# Patient Record
Sex: FEMALE | Race: WHITE | NOT HISPANIC OR LATINO | Employment: FULL TIME | URBAN - METROPOLITAN AREA
[De-identification: names, ages, dates, MRNs, and addresses within clinical notes are randomized per-mention and may not be internally consistent; named-entity substitution may affect disease eponyms.]

---

## 2017-02-03 RX ORDER — TAMOXIFEN CITRATE 20 MG/1
20 TABLET ORAL DAILY
Qty: 30 TABLET | Refills: 5 | Status: SHIPPED | OUTPATIENT
Start: 2017-02-03 | End: 2017-08-14 | Stop reason: SDUPTHER

## 2017-05-17 ENCOUNTER — OFFICE VISIT (OUTPATIENT)
Dept: ONCOLOGY | Facility: CLINIC | Age: 42
End: 2017-05-17

## 2017-05-17 VITALS
BODY MASS INDEX: 34.15 KG/M2 | DIASTOLIC BLOOD PRESSURE: 81 MMHG | SYSTOLIC BLOOD PRESSURE: 124 MMHG | WEIGHT: 200 LBS | RESPIRATION RATE: 16 BRPM | TEMPERATURE: 98.7 F | HEART RATE: 84 BPM | HEIGHT: 64 IN

## 2017-05-17 DIAGNOSIS — C50.912 DUCTAL CARCINOMA OF LEFT BREAST (HCC): Primary | ICD-10-CM

## 2017-05-17 PROCEDURE — 99213 OFFICE O/P EST LOW 20 MIN: CPT | Performed by: INTERNAL MEDICINE

## 2017-05-25 ENCOUNTER — LAB REQUISITION (OUTPATIENT)
Dept: LAB | Facility: HOSPITAL | Age: 42
End: 2017-05-25

## 2017-05-25 ENCOUNTER — OUTSIDE FACILITY SERVICE (OUTPATIENT)
Dept: GASTROENTEROLOGY | Facility: CLINIC | Age: 42
End: 2017-05-25

## 2017-05-25 DIAGNOSIS — K50.10 CROHN'S DISEASE OF LARGE INTESTINE WITHOUT COMPLICATION (HCC): ICD-10-CM

## 2017-05-25 PROCEDURE — G0500 MOD SEDAT ENDO SERVICE >5YRS: HCPCS | Performed by: INTERNAL MEDICINE

## 2017-05-25 PROCEDURE — G0105 COLORECTAL SCRN; HI RISK IND: HCPCS | Performed by: INTERNAL MEDICINE

## 2017-05-25 PROCEDURE — 88305 TISSUE EXAM BY PATHOLOGIST: CPT | Performed by: INTERNAL MEDICINE

## 2017-05-31 LAB
CYTO UR: NORMAL
LAB AP CASE REPORT: NORMAL
LAB AP CLINICAL INFORMATION: NORMAL
Lab: NORMAL
PATH REPORT.ADDENDUM SPEC: NORMAL
PATH REPORT.FINAL DX SPEC: NORMAL
PATH REPORT.GROSS SPEC: NORMAL

## 2017-08-14 RX ORDER — TAMOXIFEN CITRATE 20 MG/1
TABLET ORAL
Qty: 30 TABLET | Refills: 6 | Status: SHIPPED | OUTPATIENT
Start: 2017-08-14 | End: 2017-09-13 | Stop reason: SDUPTHER

## 2017-09-13 RX ORDER — TAMOXIFEN CITRATE 20 MG/1
TABLET ORAL
Qty: 30 TABLET | Refills: 6 | Status: SHIPPED | OUTPATIENT
Start: 2017-09-13 | End: 2018-04-16 | Stop reason: SDUPTHER

## 2017-10-24 ENCOUNTER — OFFICE VISIT (OUTPATIENT)
Dept: ONCOLOGY | Facility: CLINIC | Age: 42
End: 2017-10-24

## 2017-10-24 ENCOUNTER — LAB (OUTPATIENT)
Dept: LAB | Facility: HOSPITAL | Age: 42
End: 2017-10-24

## 2017-10-24 VITALS
HEART RATE: 84 BPM | HEIGHT: 64 IN | SYSTOLIC BLOOD PRESSURE: 119 MMHG | BODY MASS INDEX: 35.32 KG/M2 | DIASTOLIC BLOOD PRESSURE: 84 MMHG | WEIGHT: 206.9 LBS | OXYGEN SATURATION: 98 % | RESPIRATION RATE: 16 BRPM | TEMPERATURE: 97.9 F

## 2017-10-24 DIAGNOSIS — C50.912 DUCTAL CARCINOMA OF LEFT BREAST (HCC): ICD-10-CM

## 2017-10-24 DIAGNOSIS — C50.912 DUCTAL CARCINOMA OF LEFT BREAST (HCC): Primary | ICD-10-CM

## 2017-10-24 DIAGNOSIS — I89.0 LYMPHEDEMA: Primary | ICD-10-CM

## 2017-10-24 LAB
ALBUMIN SERPL-MCNC: 4.2 G/DL (ref 3.2–4.8)
ALBUMIN/GLOB SERPL: 1.5 G/DL (ref 1.5–2.5)
ALP SERPL-CCNC: 122 U/L (ref 25–100)
ALT SERPL W P-5'-P-CCNC: 18 U/L (ref 7–40)
ANION GAP SERPL CALCULATED.3IONS-SCNC: 4 MMOL/L (ref 3–11)
AST SERPL-CCNC: 23 U/L (ref 0–33)
BILIRUB SERPL-MCNC: 0.2 MG/DL (ref 0.3–1.2)
BUN BLD-MCNC: 10 MG/DL (ref 9–23)
BUN/CREAT SERPL: 14.3 (ref 7–25)
CALCIUM SPEC-SCNC: 9.3 MG/DL (ref 8.7–10.4)
CHLORIDE SERPL-SCNC: 105 MMOL/L (ref 99–109)
CO2 SERPL-SCNC: 30 MMOL/L (ref 20–31)
CREAT BLD-MCNC: 0.7 MG/DL (ref 0.6–1.3)
ERYTHROCYTE [DISTWIDTH] IN BLOOD BY AUTOMATED COUNT: 13.8 % (ref 11.3–14.5)
GFR SERPL CREATININE-BSD FRML MDRD: 92 ML/MIN/1.73
GLOBULIN UR ELPH-MCNC: 2.8 GM/DL
GLUCOSE BLD-MCNC: 104 MG/DL (ref 70–100)
HCT VFR BLD AUTO: 41.1 % (ref 34.5–44)
HGB BLD-MCNC: 13 G/DL (ref 11.5–15.5)
LYMPHOCYTES # BLD AUTO: 3.4 10*3/MM3 (ref 0.6–4.8)
LYMPHOCYTES NFR BLD AUTO: 29.1 % (ref 24–44)
MCH RBC QN AUTO: 30.9 PG (ref 27–31)
MCHC RBC AUTO-ENTMCNC: 31.7 G/DL (ref 32–36)
MCV RBC AUTO: 97.7 FL (ref 80–99)
MONOCYTES # BLD AUTO: 0.4 10*3/MM3 (ref 0–1)
MONOCYTES NFR BLD AUTO: 3.3 % (ref 0–12)
NEUTROPHILS # BLD AUTO: 7.9 10*3/MM3 (ref 1.5–8.3)
NEUTROPHILS NFR BLD AUTO: 67.6 % (ref 41–71)
PLATELET # BLD AUTO: 387 10*3/MM3 (ref 150–450)
PMV BLD AUTO: 7.9 FL (ref 6–12)
POTASSIUM BLD-SCNC: 3.9 MMOL/L (ref 3.5–5.5)
PROT SERPL-MCNC: 7 G/DL (ref 5.7–8.2)
RBC # BLD AUTO: 4.2 10*6/MM3 (ref 3.89–5.14)
SODIUM BLD-SCNC: 139 MMOL/L (ref 132–146)
WBC NRBC COR # BLD: 11.7 10*3/MM3 (ref 3.5–10.8)

## 2017-10-24 PROCEDURE — 99213 OFFICE O/P EST LOW 20 MIN: CPT | Performed by: INTERNAL MEDICINE

## 2017-10-24 PROCEDURE — 36415 COLL VENOUS BLD VENIPUNCTURE: CPT

## 2017-10-24 PROCEDURE — 80053 COMPREHEN METABOLIC PANEL: CPT | Performed by: INTERNAL MEDICINE

## 2017-10-24 PROCEDURE — 85025 COMPLETE CBC W/AUTO DIFF WBC: CPT

## 2017-10-24 NOTE — PROGRESS NOTES
Chief Complaint   Follow-up premenopausal stage III infiltrating ductal carcinoma the left breast-original diagnosis with biopsy August 22, 2013, followed by neoadjuvant TAC chemotherapy.  ER focally positive, TN negative.  HER-2 positive disease with complete pathologic response by the time of bilateral mastectomies.  Status post adjuvant radiotherapy to the left chest wall and lymphatics, with subsequent tamoxifen to date    PROBLEM LIST   Patient Active Problem List   Diagnosis   • Ductal carcinoma of left breast   • Crohn disease       HISTORY OF PRESENT ILLNESS:   Interim follow-up for this very nice woman.  She is actually a couple weeks early.  She felt that her left axilla felt a little irregular to her.  She could not reveal a mass per se.  She has no pain.  She's continued her tamoxifen.  She really doesn't have any other issues or anything else to report.    Past Medical History, Past Surgical History, Social History, Family History have been reviewed and are without significant changes except as mentioned.      Medications:      Current Outpatient Prescriptions:   •  tamoxifen (NOLVADEX) 20 MG chemo tablet, TAKE 1 TABLET BY MOUTH DAILY, Disp: 30 tablet, Rfl: 6    ALLERGIES:    Allergies   Allergen Reactions   • Epinephrine    • Prilosec [Omeprazole]        ROS:  Review of Systems   Constitutional: Negative for activity change and appetite change.   HENT: Negative for mouth sores, sinus pressure and voice change.    Eyes: Negative for visual disturbance.   Respiratory: Negative for shortness of breath.    Cardiovascular: Negative for chest pain.   Gastrointestinal: Negative for abdominal pain and vomiting.   Genitourinary: Negative for dysuria.   Musculoskeletal: Negative for arthralgias and myalgias.   Skin: Negative for color change.   Neurological: Negative for dizziness, syncope and headaches.   Hematological: Negative for adenopathy.   Psychiatric/Behavioral: Negative for confusion, sleep disturbance  "and suicidal ideas. The patient is not nervous/anxious.            Objective:    /84  Pulse 84  Temp 97.9 °F (36.6 °C) (Temporal Artery )   Resp 16  Ht 64\" (162.6 cm)  Wt 206 lb 14.4 oz (93.8 kg)  SpO2 98%  BMI 35.51 kg/m2    Physical Exam   Constitutional: She is oriented to person, place, and time. She appears well-developed and well-nourished. No distress.   In no acute distress.  Although overweight, she appears quite healthy   HENT:   Head: Normocephalic.   Mouth/Throat: Oropharynx is clear and moist.   Eyes: Conjunctivae and EOM are normal. No scleral icterus.   Neck: Normal range of motion. Neck supple. No thyromegaly present.   Cardiovascular: Normal rate, regular rhythm and normal heart sounds.    No murmur heard.  Pulmonary/Chest: Effort normal and breath sounds normal. She has no wheezes. She has no rales.   Bilateral mastectomy sites demonstrate no evidence of local recurrence bilaterally.  She still has just a little bit of hyperpigmentation of the left chest wall due to radiation therapy years ago   I do not appreciate any masses or irregularities of the left axilla or for that matter of the right.  She does not have any evidence of lymphedema in either arm.   Abdominal: Soft. Bowel sounds are normal. She exhibits no distension and no mass. There is no tenderness.   Musculoskeletal: She exhibits no edema or tenderness.   Lymphadenopathy:     She has no cervical adenopathy.   Neurological: She is alert and oriented to person, place, and time. She displays normal reflexes. No cranial nerve deficit.   Skin: Skin is warm and dry. No rash noted.   Psychiatric: She has a normal mood and affect. Judgment normal.   Vitals reviewed.            RECENT LABS:  Hematology WBC   Date Value Ref Range Status   10/24/2017 11.70 (H) 3.50 - 10.80 10*3/mm3 Final     Comment:     Verified by repeat analysis.      Hemoglobin   Date Value Ref Range Status   10/24/2017 13.0 11.5 - 15.5 g/dL Final     Hematocrit "   Date Value Ref Range Status   10/24/2017 41.1 34.5 - 44.0 % Final     MCV   Date Value Ref Range Status   10/24/2017 97.7 80.0 - 99.0 fL Final     RDW   Date Value Ref Range Status   10/24/2017 13.8 11.3 - 14.5 % Final     MPV   Date Value Ref Range Status   10/24/2017 7.9 6.0 - 12.0 fL Final     Platelets   Date Value Ref Range Status   10/24/2017 387 150 - 450 10*3/mm3 Final     Immature Grans %   Date Value Ref Range Status   11/16/2016 0.2 0.0 - 0.6 % Final     Neutrophils, Absolute   Date Value Ref Range Status   10/24/2017 7.90 1.50 - 8.30 10*3/mm3 Final     Lymphocytes, Absolute   Date Value Ref Range Status   10/24/2017 3.40 0.60 - 4.80 10*3/mm3 Final     Monocytes, Absolute   Date Value Ref Range Status   10/24/2017 0.40 0.00 - 1.00 10*3/mm3 Final     Eosinophils, Absolute   Date Value Ref Range Status   11/16/2016 0.57 (H) 0.10 - 0.30 10*3/mm3 Final     Basophils, Absolute   Date Value Ref Range Status   11/16/2016 0.05 0.00 - 0.20 10*3/mm3 Final     Immature Grans, Absolute   Date Value Ref Range Status   11/16/2016 0.02 0.00 - 0.03 10*3/mm3 Final       Glucose   Date Value Ref Range Status   11/16/2016 94 70 - 100 mg/dL Final     Sodium   Date Value Ref Range Status   11/16/2016 139 132 - 146 mmol/L Final     Potassium   Date Value Ref Range Status   11/16/2016 4.0 3.5 - 5.5 mmol/L Final     CO2   Date Value Ref Range Status   11/16/2016 31.0 20.0 - 31.0 mmol/L Final     Chloride   Date Value Ref Range Status   11/16/2016 105 99 - 109 mmol/L Final     Anion Gap   Date Value Ref Range Status   11/16/2016 3.0 3.0 - 11.0 mmol/L Final     Creatinine   Date Value Ref Range Status   11/16/2016 0.80 0.60 - 1.30 mg/dL Final     BUN   Date Value Ref Range Status   11/16/2016 16 9 - 23 mg/dL Final     BUN/Creatinine Ratio   Date Value Ref Range Status   11/16/2016 20.0 7.0 - 25.0 Final     Calcium   Date Value Ref Range Status   11/16/2016 8.9 8.7 - 10.4 mg/dL Final     eGFR Non  Amer   Date Value Ref  Range Status   11/16/2016 79 >60 mL/min/1.73 Final     Alkaline Phosphatase   Date Value Ref Range Status   11/16/2016 91 25 - 100 U/L Final     Total Protein   Date Value Ref Range Status   11/16/2016 6.9 5.7 - 8.2 g/dL Final     ALT (SGPT)   Date Value Ref Range Status   11/16/2016 18 7 - 40 U/L Final     AST (SGOT)   Date Value Ref Range Status   11/16/2016 23 0 - 33 U/L Final     Total Bilirubin   Date Value Ref Range Status   11/16/2016 0.1 (L) 0.3 - 1.2 mg/dL Final     Albumin   Date Value Ref Range Status   11/16/2016 4.10 3.20 - 4.80 g/dL Final     Globulin   Date Value Ref Range Status   11/16/2016 2.8 gm/dL Final     A/G Ratio   Date Value Ref Range Status   11/16/2016 1.5 g/dL Final          Perf Status:0    Assessment/Plan    Diagnoses and all orders for this visit:    Ductal carcinoma of left breast      I think that Debbie is doing splendidly.  I suspect that she has very very modest lymphedema accounting for what she felt a week or more ago.  I reassured her that I thought things were fine.  I will go ahead and get a targeted ultrasound of the left axilla.      Rafael Leahy MD , 10/24/2017    CC:

## 2017-11-02 ENCOUNTER — HOSPITAL ENCOUNTER (OUTPATIENT)
Dept: PHYSICAL THERAPY | Facility: HOSPITAL | Age: 42
Setting detail: THERAPIES SERIES
Discharge: HOME | End: 2017-11-02
Attending: INTERNAL MEDICINE

## 2017-11-02 DIAGNOSIS — I97.2 POSTMASTECTOMY LYMPHEDEMA SYNDROME: Primary | ICD-10-CM

## 2017-11-02 PROCEDURE — 97140 MANUAL THERAPY 1/> REGIONS: CPT

## 2017-11-02 PROCEDURE — 97110 THERAPEUTIC EXERCISES: CPT

## 2017-11-02 PROCEDURE — 97161 PT EVAL LOW COMPLEX 20 MIN: CPT

## 2017-11-02 NOTE — THERAPY EVALUATION
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 Patient reported that she came to PT post op her surgery in  and was measured for a left compression garment but she never ended up buying one as she thought she did not need it.  Patient reported having light pain on the left side. At present she has not had reconstruction but she is still planning to do so once she gets everything sorted. She stated that the left side feels puffy. It always hurts and she knows it is there. She was on vacation last week and she had no pain. Patient had DCIS in  with follow up treatments of chemo then bilateral mastectomies that were skin sparing then 30 -45 radiation treatments. Patient reported that she has had a cough recently that the MDs think is allergies but her swelling is worse with allergies too. Patient is waiting on having an ultrasound on . She stated that she is going to see her primary care MD next week for multiple reasons. Patient also reported  that sometimes when she is sitting she can get a Pranav horse in the left side of her chest for no reason and it will cramp then release and go away. Very brief and it does not come back. She stated that she can do all of her activities, except vacuuming but her vacuum is really heavy, there is nothing that she is not doing. She is in remission with her crohn's disease so she is doing ok apart from putting on 10 - 20 # with her chemo treatments and she is unable to decrease the weight. She has tried weight watchers but she has not had much success.  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 No  -CD      What clinical tests have you had for this problem? Other 1 (comment)   awaiting ultrasound on December 7th  -CD      Pain     Pain Location Neck;Shoulder  -CD      Pain at Present 0  -CD      Pain at Best 0  -CD      Pain at Worst 3  -CD      Pain Frequency Intermittent  -CD      Pain Description Spasm;Cramping;Sore;Tightness  -CD      What Performance Factors Make the Current Problem(s) WORSE? --   stress, posture  -CD      Is your sleep disturbed? No  -CD      Is medication used to assist with sleep? No  -CD      Total hours of sleep per night Patient reported that she is snoring quite badly at present and is going to see her primary next week as she needs it investigating  -CD      What position do you sleep in? Right sidelying  -CD      Difficulties at work? looking down on the computer, screeens on the computer and stress - she was off on vacation last week and did not have any  symptoms  -CD      Difficulties with ADL's? no  -CD      Fall Risk Assessment    Any falls in the past year: No  -CD      Daily Activities    Primary Language English  -CD      Are you able to read Yes  -CD      Are you able to write Yes  -CD      How does patient learn best? Listening  -CD      Teaching needs identified Home Exercise Program;Management of Condition  -CD      Patient is concerned about/has problems with Flexibility;Other (comment)   swelling   -CD      Does patient have problems with the following? None  -CD      Barriers to learning None  -CD      Pt Participated in POC and Goals Yes  -CD      Safety    Are you being hurt, hit, or frightened by anyone at home or in your life? No  -CD      Are you being neglected by a caregiver No  -CD        User Key  (r) = Recorded By, (t) = Taken By, (c) = Cosigned By    Initials Name Provider Type    CD Zofia Retana, PT Physical Therapist                Lymphedema       11/02/17 1600          Lymphedema Assessment    Lymphedema Classification LUE:;secondary;stage 1 (Spontaneously Reversible)  -CD      Lymphedema Cancer Related Sx simple mastectomy;sentinel node biopsy  -CD      Stage of Cancer Stage 0  -CD      Chemo Received yes  -CD      Chemo Treatments #/Timeframe 6   then an year of herceptin  -CD      Radiation Therapy Received yes  -CD      Radiation Treatments #/Timeframe 35-40  -CD      Lymphedema Edema Assessment    Edema Assessment Comment swelling noted on the left supraclavicular region and anterior shoulder/ axilla  -CD      Skin Changes/Observations    Skin Observations Comment well healed - no open areas, no increased temperaure and no redness. the left side of her chest skin is tight from radiaiton on palpation  -CD      Lymphedema Sensation    Lymphedema Sensation Comments top of chest circumference 97 cm mid chest 99 cm  -CD      Lymphedema Measurements    Measurement Type(s) Volumetric  -CD      Volumetric Areas Upper extremities  -CD       Volumetric UE Distance interval (cm) 4  -CD      LUE Volumetric (cm)    Reference Point 0 18.7  -CD      4 22 cm  -CD      8 16.4 cm  -CD      12 17 cm  -CD      16 20.5 cm  -CD      20 24.2 cm  -CD      24 26 cm  -CD      28 26.5 cm  -CD      32 29 cm  -CD      36 33 cm  -CD      40 34.5 cm  -CD      44 37 cm  -CD      48 37.5 cm  -CD      LUE Volume Calculation- 4cm 2967.2  -CD      RUE Volumetric (cm)    Reference Point 0 19  -CD      4 21.5 cm  -CD      8 16 cm  -CD      12 17.5 cm  -CD      16 20 cm  -CD      20 23 cm  -CD      24 27.5 cm  -CD      28 28 cm  -CD      32 28 cm  -CD      36 33 cm  -CD      40 35 cm  -CD      44 37.5 cm  -CD      48 38 cm  -CD      RUE Volume Calculation- 4cm 3005.5  -CD      Manual Lymphatic Drainage    Manual Lymphatic Drainage initial sequence;opened regional lymph nodes;extremity treatment  -CD      Initial Sequence supraclavicular;shoulder collectors;abdomen  -CD      Supraclavicular right;left  -CD      Shoulder Collectors right;left  -CD      Abdomen superficial;deep  -CD      Opened Regional Lymph Nodes axillary  -CD      Axillary right;left  -CD      Extremity Treatment MLD to proximal limb only  -CD      MLD to Proximal Limb Only anterior chest, left upper arm and axilla region  -CD      Manual Lymphatic Drainage Comments Taught self simple MLD and written instructions provided.   -CD      Compression/Skin Care    Compression/Skin Care compression garment  -CD      Compression Garment Comments measured for a compression sleeve and provided patient with the measures. Advised a compression t-shirt for the upper arm/ neck as well as a posture brace. i also advised that she look for a wider bra to support the tissue at the side to prevent it being squeezed out where i think her swelling is  -CD        User Key  (r) = Recorded By, (t) = Taken By, (c) = Cosigned By    Initials Name Provider Type    BRENDA Retana PT Physical Therapist                  PT Ortho        11/02/17 1600    Subjective Pain    Able to rate subjective pain? yes  -CD    Pre-Treatment Pain Level 0  -CD    Post-Treatment Pain Level 0  -CD    Posture/Observations    Posture/Observations Comments Flexed posture with both shoulders anteriorly rotated left > right. The left one also has some swelling anteriorly that may be due to the fit of the bra. where the bra goes round the tissue is being pushed out and then is causing a swelling - she is going ot look fo ra bra that has higher sides to support under the axilla to try and help with the swelling  -CD    ROM (Range of Motion)    General ROM Detail WNL  -CD    MMT (Manual Muscle Testing)    General MMT Assessment Detail  right 53, 50, 50 Left 51, 50, 49#  -CD      User Key  (r) = Recorded By, (t) = Taken By, (c) = Cosigned By    Initials Name Provider Type    BRENDA Retana, PT Physical Therapist                          Therapy Education       11/02/17 1620          Therapy Education    Given HEP;Symptoms/condition management;Edema management;Posture/body mechanics  -CD      Program New  -CD      How Provided Verbal;Demonstration;Written  -CD      Provided to Patient  -CD      Level of Understanding Teach back education performed;Verbalized;Demonstrated  -CD        User Key  (r) = Recorded By, (t) = Taken By, (c) = Cosigned By    Initials Name Provider Type    CD Zofia Retana, PT Physical Therapist                  Exercises       11/02/17 1600          Subjective Pain    Able to rate subjective pain? yes  -CD      Pre-Treatment Pain Level 0  -CD      Post-Treatment Pain Level 0  -CD      Exercise 1    Exercise Name 1 Postural correction overcorrect in sitting  -CD      Cueing 1 Tactile  -CD      Sets 1 1  -CD      Reps 1 10  -CD      Time (Seconds) 1 5  -CD      Exercise 2    Exercise Name 2 blade squeezes in sitting and standing against the wall  -CD      Cueing 2 Tactile  -CD      Sets 2 1  -CD      Reps 2 10  -CD      Time (Seconds) 2 5  -CD       Additional Comments written instructions provided  -CD        User Key  (r) = Recorded By, (t) = Taken By, (c) = Cosigned By    Initials Name Provider Type    BRENDA Retana, PT Physical Therapist                              PT OP Goals       11/02/17 1600       PT Short Term Goals    STG Date to Achieve 11/16/17  -CD     STG 1 Patient demonstrate proper awareness of What is Lymphedema and 18 Steps of Prevention for prevention, management, care of symptoms and ease of transition to self-care of condition.   -CD     STG 2 Patient independent and compliant with self-massage techniques as needed for improved lymphatic drainage, decreased edema/symptoms, decreased risk of infection and improved transition to self-care of condition.   -CD     STG 3 Patient to be measured for a compression garment  -CD     STG 3 Progress Met  -CD     Long Term Goals    LTG Date to Achieve 11/30/17  -CD     LTG 1 Patient to be independent donning and doffing their compression garment   -CD     LTG 2 Patient to be independent with home program to improve her posture  -CD     Time Calculation    PT Goal Re-Cert Due Date 12/02/17  -CD       User Key  (r) = Recorded By, (t) = Taken By, (c) = Cosigned By    Initials Name Provider Type    BRENDA Retana, PT Physical Therapist                PT Assessment/Plan       11/02/17 1622       PT Assessment    Functional Limitations Performance in work activities  -CD     Impairments Impaired flexibility;Edema;Impaired lymphatic circulation  -CD     Assessment Comments Patient has low stable symptoms associated with treatments for breast cancer. She verbalised understanding of all advice and eduaciton and to get the compression sleeve and t-shirt as well as bra to help with the swelling. She also seemed to undeerstand the posture exercises and demonstrate them . she has stage one lymphdema in the left side of her chest and supraclvicular region following treatments for breast cancer.  -CD      Please refer to paper survey for additional self-reported information Yes  -CD     Rehab Potential Good  -CD     Patient/caregiver participated in establishment of treatment plan and goals Yes  -CD     Patient would benefit from skilled therapy intervention Yes  -CD     PT Plan    PT Frequency 1x/week  -CD     Predicted Duration of Therapy Intervention (days/wks) 2-3 weeks  -CD     Planned CPT's? PT EVAL LOW COMPLEXITY: 12795;PT THER PROC EA 15 MIN: 28255;PT MANUAL THERAPY EA 15 MIN: 57684  -CD     PT Plan Comments Continue next week  -CD       User Key  (r) = Recorded By, (t) = Taken By, (c) = Cosigned By    Initials Name Provider Type    CD Zofia Retana, PT Physical Therapist                 Outcome Measures       11/02/17 1600          DASH    Open a tight or new jar. 1  -CD      Write 1  -CD      Turn a key 1  -CD      Prepare a meal 1  -CD      Push open a heavy door 1  -CD      Place an object on a shelf above your head 1  -CD      Do heavy household chores (e.g., wash walls, wash floors) 1  -CD      Garden or do yard work 1  -CD      Make a bed 1  -CD      Carry a shopping bag or briefcase 1  -CD      Carry a heavy object (over 10 lbs) 1  -CD      Change a lightbulb overhead 2  -CD      Wash or blow dry your hair 1  -CD      Wash your back 1  -CD      Put on a pullover sweater 1  -CD      Use a knife to cut food 1  -CD      Recreational activities in which require little effort (e.g., cardplaying, knitting, etc.) 1  -CD      Recreational activities in which you take some force or impact through your arm, should or hand (e.g. golf, hammering, tennis, etc.) 1  -CD      Recreational Activities in which you move your arm freely (e.g., frisbee, badminton, etc.) 1  -CD      Manage transportation needs (getting from one place to another) 1  -CD      Sexual Activities 1  -CD      During the past week, to what extent has your arm, shoulder, or hand problem interfered with your normal social activites with family,  friends, neighbors or groups? 1  -CD      During the past week, were you limited in your work or other regular daily activities as a result of your arm, shoulder or hand problem? 1  -CD      Arm, Shoulder, or hand pain 2  -CD      Arm, shoulder or hand pain when you performed any specific activity 1  -CD      Tingling (pins and needles) in your arm, shoulder, or hand 1  -CD      Weakness in your arm, shoulder or hand 1  -CD      Stiffness in your arm, shoulder or hand 2  -CD      During the past week, how much difficulty have you had sleeping because of the pain in your arm, shoulder or hand? 1  -CD      I feel less capable, less confident or less useful because of my arm, shoulder or hand problem 2  -CD      DASH Sum  34  -CD      Number of Questions Answered 30  -CD      DASH Score 3.33  -CD      Functional Assessment    Outcome Measure Options Disabilities of the Arm, Shoulder, and Hand (DASH)  -CD        User Key  (r) = Recorded By, (t) = Taken By, (c) = Cosigned By    Initials Name Provider Type    CD Zofia Retana, PT Physical Therapist            Time Calculation:   Start Time: 1000  Total Timed Code Minutes- PT: 35 minute(s)     Therapy Charges for Today     Code Description Service Date Service Provider Modifiers Qty    73102624655 HC PT MANUAL THERAPY EA 15 MIN 11/2/2017 Zofia Retana, PT GP 1    04798734380 HC PT THER PROC EA 15 MIN 11/2/2017 Zofia Retana, PT GP 1    18607637451 HC PT EVAL LOW COMPLEXITY 4 11/2/2017 Zofia Retana, PT GP 1          PT G-Codes  Outcome Measure Options: Disabilities of the Arm, Shoulder, and Hand (DASH)         Zofia Retana, PT  11/2/2017

## 2017-11-09 ENCOUNTER — HOSPITAL ENCOUNTER (OUTPATIENT)
Dept: PHYSICAL THERAPY | Facility: HOSPITAL | Age: 42
Setting detail: THERAPIES SERIES
Discharge: HOME | End: 2017-11-09
Attending: INTERNAL MEDICINE

## 2017-11-09 DIAGNOSIS — I97.2 POSTMASTECTOMY LYMPHEDEMA SYNDROME: Primary | ICD-10-CM

## 2017-11-09 PROCEDURE — 97140 MANUAL THERAPY 1/> REGIONS: CPT

## 2017-11-09 NOTE — THERAPY TREATMENT NOTE
Outpatient Physical Therapy Lymphedema Treatment Note  [facility]     Patient Name: Debbie Clemens  : 1975  MRN: [id number]  Today's Date: 2017        Visit Date: 2017    Visit Dx:    ICD-10-CM ICD-9-CM   1. 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 She stated that she is having some issues with the right lower quadrant and is having pain and going to her gynecologist later today. She has not yet had the ultrasound on the left side of her chest.  -CD      Subjective Pain    Able to rate subjective pain? yes  -CD      Pre-Treatment Pain Level 4  -CD      Post-Treatment Pain Level 4  -CD      Subjective Pain Comment right lower abdoment  -CD      Lymphedema Edema Assessment    Edema Assessment Comment less swelling noted on the left upper supraclavicular region as well as under the axilla. She stated that she has not yet had time to get the camisole compression yet  -CD      Skin Changes/Observations    Skin Observations Comment right lower quadrant on the abdomen - no redness but it is increased temperature and there was a thickened area of about 10 cm width by 5 cm length that was firm and i had patient palpate so she could show her MD later today. She is having issues with cysts.  -CD      Lymphedema Sensation    Lymphedema Sensation Comments top of chest 95.5 cm mid chest 98cm  -CD      Lymphedema Measurements    Measurement Type(s) Volumetric  -CD      Volumetric Areas Upper extremities  -CD      Volumetric UE Distance interval (cm) 4  -CD      LUE Volumetric (cm)    Reference Point 0 19.5  -CD      4 21.5 cm  -CD      8 16 cm  -CD       12 17.5 cm  -CD      16 20.3 cm  -CD      20 24 cm  -CD      24 26.5 cm  -CD      28 26 cm  -CD      32 29.5 cm  -CD      36 32.8 cm  -CD      40 34 cm  -CD      44 35 cm  -CD      48 36 cm  -CD      LUE Volume Calculation- 4cm 2869.2  -CD      Manual Lymphatic Drainage    Manual Lymphatic Drainage initial sequence;opened regional lymph nodes;extremity treatment  -CD      Initial Sequence supraclavicular;shoulder collectors;abdomen  -CD      Supraclavicular right;left  -CD      Shoulder Collectors right;left  -CD      Abdomen superficial  -CD      Opened Regional Lymph Nodes axillary  -CD      Axillary right;left  -CD      Extremity Treatment MLD to full limb  -CD      MLD to Proximal Limb Only anterior chest and upper limb mostly  -CD      Manual Lymphatic Drainage Comments Reinforced simple MLD and posture exercises and patient is already correcting her posture in sitting and standing  -CD        User Key  (r) = Recorded By, (t) = Taken By, (c) = Cosigned By    Initials Name Provider Type    BRENDA Retana, PT Physical Therapist                              PT Assessment/Plan       11/09/17 1529       PT Assessment    Assessment Comments Patient's swelling in the top of the chest into the axilla is improved and was less swollen today. she verbalised understanding of showing her MD the right lower quadrant thickening. she is working well with the posture exercises.  -CD     PT Plan    PT Plan Comments Continue next week  -CD       User Key  (r) = Recorded By, (t) = Taken By, (c) = Cosigned By    Initials Name Provider Type    BRENDA Retana, PT Physical Therapist                     Exercises       11/09/17 1500          Subjective Comments    Subjective Comments Patient reported that she has tried to do the simple MLD and she has noticed that the swelling on the left side of the chest is better and it is less swollen now. She stated that she is having some issues with the right lower quadrant and is  having pain and going to her gynecologist later today. She has not yet had the ultrasound on the left side of her chest.  -CD      Subjective Pain    Able to rate subjective pain? yes  -CD      Pre-Treatment Pain Level 4  -CD      Post-Treatment Pain Level 4  -CD      Subjective Pain Comment right lower abdoment  -CD        User Key  (r) = Recorded By, (t) = Taken By, (c) = Cosigned By    Initials Name Provider Type    CD Zofia Retana PT Physical Therapist                                  Therapy Education       11/09/17 1529          Therapy Education    Given Symptoms/condition management;Edema management;Posture/body mechanics  -CD      Program Reinforced  -CD      How Provided Verbal;Demonstration  -CD      Provided to Patient  -CD      Level of Understanding Verbalized  -CD        User Key  (r) = Recorded By, (t) = Taken By, (c) = Cosigned By    Initials Name Provider Type    CD Zofia Retana, PT Physical Therapist                Time Calculation:   Start Time: 1000  Total Timed Code Minutes- PT: 55 minute(s)     Therapy Charges for Today     Code Description Service Date Service Provider Modifiers Qty    76067420756 HC PT MANUAL THERAPY EA 15 MIN 11/9/2017 Zofia Retana, PT GP 4                    Zofia Retana PT  11/9/2017

## 2017-11-14 ENCOUNTER — HOSPITAL ENCOUNTER (OUTPATIENT)
Dept: PHYSICAL THERAPY | Facility: HOSPITAL | Age: 42
Setting detail: THERAPIES SERIES
Discharge: HOME | End: 2017-11-14
Attending: INTERNAL MEDICINE

## 2017-11-14 DIAGNOSIS — I97.2 POSTMASTECTOMY LYMPHEDEMA SYNDROME: Primary | ICD-10-CM

## 2017-11-14 PROCEDURE — 97140 MANUAL THERAPY 1/> REGIONS: CPT

## 2017-11-14 NOTE — THERAPY DISCHARGE NOTE
Outpatient Physical Therapy Lymphedema Treatment Note/Discharge Summary   [place]     Patient Name: Debbie Clemens  : 1975  MRN: [id number]  Today's Date: 2017      Visit Date: 2017    Visit Dx:    ICD-10-CM ICD-9-CM   1. 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 She stated that she has her ultrasound on  still. She also stated that she has had her right side if her abdomen checked and that she had an CT scan and all is ok. To monitor and call back if she has any other issues with her abdomen  -CD      Subjective Pain    Able to rate subjective pain? yes  -CD      Pre-Treatment Pain Level 0  -CD      Post-Treatment Pain Level 0  -CD      Subjective Pain Comment no left chest or upper arm pain  -CD      Lymphedema Edema Assessment    Edema Assessment Comment soft tissue less swelling in the left upper arm  -CD      Skin Changes/Observations    Skin Observations Comment nil - no open areas, no redness or no increased temperature. no driness on the skin either it looks good  -CD      Lymphedema Measurements    Measurement Type(s) Volumetric  -CD      Volumetric Areas Upper extremities  -CD      Volumetric UE Distance interval (cm) 4  -CD      LUE Volumetric (cm)    Reference Point 0 19  -CD      4 21 cm  -CD      8 16.1 cm  -CD      12 17.4 cm  -CD      16 20.3 cm  -CD      20 14.4 cm  -CD      24 26 cm  -CD      28 26.5 cm  -CD      32 30.5 cm  -CD      36 33 cm  -CD      40 33.5 cm  -CD      44 36 cm  -CD      48 37 cm  -CD      LUE Volume Calculation- 4cm 2803.4  -CD      Manual Lymphatic Drainage    Manual Lymphatic Drainage initial sequence;opened regional lymph nodes;extremity treatment   -CD      Initial Sequence supraclavicular;shoulder collectors;abdomen  -CD      Supraclavicular right;left  -CD      Shoulder Collectors right;left  -CD      Abdomen superficial  -CD      Opened Regional Lymph Nodes axillary  -CD      Axillary right;left  -CD      Extremity Treatment MLD to full limb  -CD      MLD to Proximal Limb Only anteiror chest/ arm  -CD      Manual Lymphatic Drainage Comments discussed posture and compression  -CD        User Key  (r) = Recorded By, (t) = Taken By, (c) = Cosigned By    Initials Name Provider Type    BRENDA Retana, PT Physical Therapist                              PT Assessment/Plan       11/14/17 1644       PT Assessment    Assessment Comments Patient has much improvement in the swelling on the left side of her chest into the axilla and she did not report any issue with the supraclavicular region today. She is working on her posture and doing very well with that and she has been working on the simple MLD. She has met most of her goals except the compression but she is still working on getting a garment.   -CD     PT Plan    PT Plan Comments Patient to continue with self management and will call if she needs further treatment in the future  -CD       User Key  (r) = Recorded By, (t) = Taken By, (c) = Cosigned By    Initials Name Provider Type    BRENDA Retana, PT Physical Therapist                     Exercises       11/14/17 0900          Subjective Comments    Subjective Comments Patient reported that the left side of her chest and upper arm is better and less swollen. She stated that she has her ultrasound on December 6th still. She also stated that she has had her right side if her abdomen checked and that she had an CT scan and all is ok. To monitor and call back if she has any other issues with her abdomen  -CD      Subjective Pain    Able to rate subjective pain? yes  -CD      Pre-Treatment Pain Level 0  -CD      Post-Treatment Pain Level 0  -CD       Subjective Pain Comment no left chest or upper arm pain  -CD        User Key  (r) = Recorded By, (t) = Taken By, (c) = Cosigned By    Initials Name Provider Type    BRENDA Retana PT Physical Therapist                              PT OP Goals       11/14/17 1600       PT Short Term Goals    STG Date to Achieve 11/16/17  -CD     STG 1 Patient demonstrate proper awareness of What is Lymphedema and 18 Steps of Prevention for prevention, management, care of symptoms and ease of transition to self-care of condition.   -CD     STG 1 Progress Met  -CD     STG 2 Patient independent and compliant with self-massage techniques as needed for improved lymphatic drainage, decreased edema/symptoms, decreased risk of infection and improved transition to self-care of condition.   -CD     STG 2 Progress Met  -CD     STG 3 Patient to be measured for a compression garment  -CD     STG 3 Progress Met  -CD     Long Term Goals    LTG Date to Achieve 11/30/17  -CD     LTG 1 Patient to be independent donning and doffing their compression garment   -CD     LTG 1 Progress Not Met  -CD     LTG 2 Patient to be independent with home program to improve her posture  -CD     LTG 2 Progress Met  -CD       User Key  (r) = Recorded By, (t) = Taken By, (c) = Cosigned By    Initials Name Provider Type    BRENDA Retana PT Physical Therapist                Therapy Education       11/14/17 1644          Therapy Education    Given Symptoms/condition management;Edema management;Posture/body mechanics  -CD      Program Reinforced  -CD      How Provided Verbal;Demonstration  -CD      Provided to Patient  -CD      Level of Understanding Verbalized  -CD        User Key  (r) = Recorded By, (t) = Taken By, (c) = Cosigned By    Initials Name Provider Type    BRENDA Retana PT Physical Therapist                Time Calculation:   Start Time: 0830  Total Timed Code Minutes- PT: 55 minute(s)     Therapy Charges for Today     Code Description Service  Date Service Provider Modifiers Qty    45651851487 HC PT MANUAL THERAPY EA 15 MIN 11/14/2017 Zofia Retana, PT GP 4                 OP PT Discharge Summary  Date of Discharge: 11/14/17  Reason for Discharge: All goals achieved  Outcomes Achieved: Able to achieve all goals within established timeline  Discharge Destination: Home with home program      Zofia Retana, PT  11/14/2017

## 2017-12-07 ENCOUNTER — HOSPITAL ENCOUNTER (OUTPATIENT)
Dept: ULTRASOUND IMAGING | Facility: HOSPITAL | Age: 42
Discharge: HOME | End: 2017-12-07
Attending: RADIOLOGY

## 2017-12-07 ENCOUNTER — HOSPITAL ENCOUNTER (OUTPATIENT)
Dept: MAMMOGRAPHY | Facility: HOSPITAL | Age: 42
Discharge: HOME | End: 2017-12-07
Attending: INTERNAL MEDICINE | Admitting: INTERNAL MEDICINE

## 2017-12-07 DIAGNOSIS — C50.912 DUCTAL CARCINOMA OF LEFT BREAST (HCC): ICD-10-CM

## 2017-12-07 DIAGNOSIS — I89.0 LYMPHEDEMA: ICD-10-CM

## 2017-12-07 PROCEDURE — 76642 ULTRASOUND BREAST LIMITED: CPT

## 2017-12-07 PROCEDURE — G0279 TOMOSYNTHESIS, MAMMO: HCPCS

## 2017-12-07 PROCEDURE — G0206 DX MAMMO INCL CAD UNI: HCPCS

## 2017-12-08 PROCEDURE — 76642 ULTRASOUND BREAST LIMITED: CPT | Performed by: RADIOLOGY

## 2017-12-08 PROCEDURE — 77061 BREAST TOMOSYNTHESIS UNI: CPT | Performed by: RADIOLOGY

## 2017-12-08 PROCEDURE — 77065 DX MAMMO INCL CAD UNI: CPT | Performed by: RADIOLOGY

## 2018-01-01 ENCOUNTER — TELEPHONE (OUTPATIENT)
Dept: URGENT CARE | Facility: CLINIC | Age: 43
End: 2018-01-01

## 2018-01-01 NOTE — TELEPHONE ENCOUNTER
Patient called stating her pharmacy is closed and would like it changed to [facility], RX called to [address]

## 2018-04-16 RX ORDER — TAMOXIFEN CITRATE 20 MG/1
TABLET ORAL
Qty: 30 TABLET | Refills: 11 | Status: ON HOLD | OUTPATIENT
Start: 2018-04-16 | End: 2021-03-17

## 2018-04-24 ENCOUNTER — OFFICE VISIT (OUTPATIENT)
Dept: ONCOLOGY | Facility: CLINIC | Age: 43
End: 2018-04-24

## 2018-04-24 ENCOUNTER — LAB (OUTPATIENT)
Dept: LAB | Facility: HOSPITAL | Age: 43
End: 2018-04-24

## 2018-04-24 VITALS
HEIGHT: 64 IN | WEIGHT: 200 LBS | TEMPERATURE: 98.3 F | DIASTOLIC BLOOD PRESSURE: 82 MMHG | RESPIRATION RATE: 16 BRPM | HEART RATE: 83 BPM | BODY MASS INDEX: 34.15 KG/M2 | SYSTOLIC BLOOD PRESSURE: 125 MMHG

## 2018-04-24 DIAGNOSIS — C50.912 DUCTAL CARCINOMA OF LEFT BREAST (HCC): Primary | ICD-10-CM

## 2018-04-24 DIAGNOSIS — C50.912 DUCTAL CARCINOMA OF LEFT BREAST (HCC): ICD-10-CM

## 2018-04-24 LAB
ALBUMIN SERPL-MCNC: 4.2 G/DL (ref 3.2–4.8)
ALBUMIN/GLOB SERPL: 1.6 G/DL (ref 1.5–2.5)
ALP SERPL-CCNC: 128 U/L (ref 25–100)
ALT SERPL W P-5'-P-CCNC: 18 U/L (ref 7–40)
ANION GAP SERPL CALCULATED.3IONS-SCNC: 6 MMOL/L (ref 3–11)
AST SERPL-CCNC: 19 U/L (ref 0–33)
BILIRUB SERPL-MCNC: 0.3 MG/DL (ref 0.3–1.2)
BUN BLD-MCNC: 10 MG/DL (ref 9–23)
BUN/CREAT SERPL: 14.3 (ref 7–25)
CALCIUM SPEC-SCNC: 9 MG/DL (ref 8.7–10.4)
CHLORIDE SERPL-SCNC: 105 MMOL/L (ref 99–109)
CO2 SERPL-SCNC: 28 MMOL/L (ref 20–31)
CREAT BLD-MCNC: 0.7 MG/DL (ref 0.6–1.3)
ERYTHROCYTE [DISTWIDTH] IN BLOOD BY AUTOMATED COUNT: 14.4 % (ref 11.3–14.5)
GFR SERPL CREATININE-BSD FRML MDRD: 92 ML/MIN/1.73
GLOBULIN UR ELPH-MCNC: 2.6 GM/DL
GLUCOSE BLD-MCNC: 101 MG/DL (ref 70–100)
HCT VFR BLD AUTO: 40.4 % (ref 34.5–44)
HGB BLD-MCNC: 12.7 G/DL (ref 11.5–15.5)
LYMPHOCYTES # BLD AUTO: 2.5 10*3/MM3 (ref 0.6–4.8)
LYMPHOCYTES NFR BLD AUTO: 24.7 % (ref 24–44)
MCH RBC QN AUTO: 29.8 PG (ref 27–31)
MCHC RBC AUTO-ENTMCNC: 31.5 G/DL (ref 32–36)
MCV RBC AUTO: 94.8 FL (ref 80–99)
MONOCYTES # BLD AUTO: 0.4 10*3/MM3 (ref 0–1)
MONOCYTES NFR BLD AUTO: 3.5 % (ref 0–12)
NEUTROPHILS # BLD AUTO: 7.3 10*3/MM3 (ref 1.5–8.3)
NEUTROPHILS NFR BLD AUTO: 71.8 % (ref 41–71)
PLATELET # BLD AUTO: 388 10*3/MM3 (ref 150–450)
PMV BLD AUTO: 8.2 FL (ref 6–12)
POTASSIUM BLD-SCNC: 4 MMOL/L (ref 3.5–5.5)
PROT SERPL-MCNC: 6.8 G/DL (ref 5.7–8.2)
RBC # BLD AUTO: 4.26 10*6/MM3 (ref 3.89–5.14)
SODIUM BLD-SCNC: 139 MMOL/L (ref 132–146)
WBC NRBC COR # BLD: 10.1 10*3/MM3 (ref 3.5–10.8)

## 2018-04-24 PROCEDURE — 99213 OFFICE O/P EST LOW 20 MIN: CPT | Performed by: INTERNAL MEDICINE

## 2018-04-24 PROCEDURE — 80053 COMPREHEN METABOLIC PANEL: CPT

## 2018-04-24 PROCEDURE — 85025 COMPLETE CBC W/AUTO DIFF WBC: CPT

## 2018-04-24 PROCEDURE — 36415 COLL VENOUS BLD VENIPUNCTURE: CPT

## 2018-04-24 NOTE — PROGRESS NOTES
Chief Complaint   Follow-up premenopausal stage III infiltrating ductal carcinoma of the left breast.  Original diagnosis August 22, 2013, followed by neoadjuvant THC  chemotherapy.  Tumor focally positive and [place] negative, HER-2 positive disease with complete pathologic response by the time of bilateral mastectomies.  Status post one full year of Herceptin therapy.  Status post adjuvant radiotherapy to the left chest wall the supraclavicular area and the left inframammary chain.  Tamoxifen to date.  History of Crohn's disease diagnosed at age 13, treated before diagnosis of breast cancer with chronic Imuran therapy-Imuran discontinued then and never restarted        PROBLEM LIST   Patient Active Problem List   Diagnosis   • Ductal carcinoma of left breast   • Crohn disease       HISTORY OF PRESENT ILLNESS:   Feels well in general.  Did however develop influenza back in the winter.  Subsequently had problems with bronchitis and sinusitis so she had several courses of antibiotics.  Ultimately regained total health.  Has had some issues with diarrhea but that's improving steadily.  Nothing new to report otherwise.    Past Medical History, Past Surgical History, Social History, Family History have been reviewed and are without significant changes except as mentioned.      Medications:      Current Outpatient Prescriptions:   •  tamoxifen (NOLVADEX) 20 MG chemo tablet, TAKE 1 TABLET BY MOUTH  DAILY, Disp: 30 tablet, Rfl: 11    ALLERGIES:    Allergies   Allergen Reactions   • Epinephrine    • Prilosec [Omeprazole]        ROS:  Review of Systems   Constitutional: Negative for activity change and appetite change.        Weight has been stable.  Energy has been good.   HENT: Negative for mouth sores, sinus pressure and voice change.    Eyes: Negative for visual disturbance.   Respiratory: Negative for shortness of breath.    Cardiovascular: Negative for chest pain.   Gastrointestinal: Negative for abdominal pain and  "vomiting.        GI issues with diarrhea mentioned above-improving   Genitourinary: Negative for dysuria.   Musculoskeletal: Negative for arthralgias and myalgias.   Skin: Negative for color change.   Neurological: Negative for dizziness, syncope and headaches.   Hematological: Negative for adenopathy.   Psychiatric/Behavioral: Negative for confusion, sleep disturbance and suicidal ideas. The patient is not nervous/anxious.            Objective:    /82   Pulse 83   Temp 98.3 °F (36.8 °C)   Resp 16   Ht 162.6 cm (64\")   Wt 90.7 kg (200 lb)   BMI 34.33 kg/m²     Physical Exam   Constitutional: She is oriented to person, place, and time. She appears well-developed and well-nourished. No distress.   Debbie appears relaxed and comfortable and in no distress at all   HENT:   Head: Normocephalic.   Mouth/Throat: Oropharynx is clear and moist.   Eyes: Conjunctivae and EOM are normal. No scleral icterus.   Neck: Normal range of motion. Neck supple. No thyromegaly present.   Cardiovascular: Normal rate, regular rhythm and normal heart sounds.    No murmur heard.  Pulmonary/Chest: Effort normal and breath sounds normal. She has no wheezes. She has no rales.   Bilateral mastectomy sites noted-no evidence of local recurrence   Abdominal: Soft. Bowel sounds are normal. She exhibits no distension and no mass. There is no tenderness.   Musculoskeletal: She exhibits no edema or tenderness.   Lymphadenopathy:     She has no cervical adenopathy.   Neurological: She is alert and oriented to person, place, and time. She displays normal reflexes. No cranial nerve deficit.   Skin: Skin is warm and dry. No rash noted.   Psychiatric: She has a normal mood and affect. Judgment normal.   Vitals reviewed.             RECENT LABS:  Hematology WBC   Date Value Ref Range Status   04/24/2018 10.10 3.50 - 10.80 10*3/mm3 Final     Hemoglobin   Date Value Ref Range Status   04/24/2018 12.7 11.5 - 15.5 g/dL Final     Hematocrit   Date " Value Ref Range Status   04/24/2018 40.4 34.5 - 44.0 % Final     MCV   Date Value Ref Range Status   04/24/2018 94.8 80.0 - 99.0 fL Final     RDW   Date Value Ref Range Status   04/24/2018 14.4 11.3 - 14.5 % Final     MPV   Date Value Ref Range Status   04/24/2018 8.2 6.0 - 12.0 fL Final     Platelets   Date Value Ref Range Status   04/24/2018 388 150 - 450 10*3/mm3 Final     Immature Grans %   Date Value Ref Range Status   11/16/2016 0.2 0.0 - 0.6 % Final     Neutrophils, Absolute   Date Value Ref Range Status   04/24/2018 7.30 1.50 - 8.30 10*3/mm3 Final     Lymphocytes, Absolute   Date Value Ref Range Status   04/24/2018 2.50 0.60 - 4.80 10*3/mm3 Final     Monocytes, Absolute   Date Value Ref Range Status   04/24/2018 0.40 0.00 - 1.00 10*3/mm3 Final     Eosinophils, Absolute   Date Value Ref Range Status   11/16/2016 0.57 (H) 0.10 - 0.30 10*3/mm3 Final     Basophils, Absolute   Date Value Ref Range Status   11/16/2016 0.05 0.00 - 0.20 10*3/mm3 Final     Immature Grans, Absolute   Date Value Ref Range Status   11/16/2016 0.02 0.00 - 0.03 10*3/mm3 Final       Glucose   Date Value Ref Range Status   10/24/2017 104 (H) 70 - 100 mg/dL Final     Sodium   Date Value Ref Range Status   10/24/2017 139 132 - 146 mmol/L Final     Potassium   Date Value Ref Range Status   10/24/2017 3.9 3.5 - 5.5 mmol/L Final     CO2   Date Value Ref Range Status   10/24/2017 30.0 20.0 - 31.0 mmol/L Final     Chloride   Date Value Ref Range Status   10/24/2017 105 99 - 109 mmol/L Final     Anion Gap   Date Value Ref Range Status   10/24/2017 4.0 3.0 - 11.0 mmol/L Final     Creatinine   Date Value Ref Range Status   10/24/2017 0.70 0.60 - 1.30 mg/dL Final     BUN   Date Value Ref Range Status   10/24/2017 10 9 - 23 mg/dL Final     BUN/Creatinine Ratio   Date Value Ref Range Status   10/24/2017 14.3 7.0 - 25.0 Final     Calcium   Date Value Ref Range Status   10/24/2017 9.3 8.7 - 10.4 mg/dL Final     eGFR Non  Amer   Date Value Ref Range  Status   10/24/2017 92 >60 mL/min/1.73 Final     Alkaline Phosphatase   Date Value Ref Range Status   10/24/2017 122 (H) 25 - 100 U/L Final     Total Protein   Date Value Ref Range Status   10/24/2017 7.0 5.7 - 8.2 g/dL Final     ALT (SGPT)   Date Value Ref Range Status   10/24/2017 18 7 - 40 U/L Final     AST (SGOT)   Date Value Ref Range Status   10/24/2017 23 0 - 33 U/L Final     Total Bilirubin   Date Value Ref Range Status   10/24/2017 0.2 (L) 0.3 - 1.2 mg/dL Final     Albumin   Date Value Ref Range Status   10/24/2017 4.20 3.20 - 4.80 g/dL Final     Globulin   Date Value Ref Range Status   10/24/2017 2.8 gm/dL Final     A/G Ratio   Date Value Ref Range Status   10/24/2017 1.5 1.5 - 2.5 g/dL Final          Perf Status:0    Assessment/Plan    Diagnoses and all orders for this visit:    Ductal carcinoma of left breast      History of stage III left breast ductal carcinoma-doing well and without disease recurrence getting close to 5 years from diagnosis.  I will plan on seeing the patient back in 6 months.  Her CBC was normal and I told her so.  I'll call her if there is any issues with her chemistries.    Diarrhea, at least in part related to antibiotic therapy-improving.  I did remind her that she should probably make an follow-up appointment with Dr. Grant her gastroenterologist      Rafael Leahy MD , 4/24/2018    CC:

## 2018-05-03 DIAGNOSIS — Z12.11 SCREENING FOR COLON CANCER: Primary | ICD-10-CM

## 2018-05-07 ENCOUNTER — LAB REQUISITION (OUTPATIENT)
Dept: LAB | Facility: HOSPITAL | Age: 43
End: 2018-05-07

## 2018-05-07 ENCOUNTER — OUTSIDE FACILITY SERVICE (OUTPATIENT)
Dept: GASTROENTEROLOGY | Facility: CLINIC | Age: 43
End: 2018-05-07

## 2018-05-07 DIAGNOSIS — R10.84 GENERALIZED ABDOMINAL PAIN: ICD-10-CM

## 2018-05-07 PROCEDURE — 88305 TISSUE EXAM BY PATHOLOGIST: CPT | Performed by: INTERNAL MEDICINE

## 2018-05-07 PROCEDURE — 45380 COLONOSCOPY AND BIOPSY: CPT | Performed by: INTERNAL MEDICINE

## 2018-05-07 PROCEDURE — G0500 MOD SEDAT ENDO SERVICE >5YRS: HCPCS | Performed by: INTERNAL MEDICINE

## 2018-05-08 LAB
CYTO UR: NORMAL
LAB AP CASE REPORT: NORMAL
LAB AP CLINICAL INFORMATION: NORMAL
Lab: NORMAL
PATH REPORT.FINAL DX SPEC: NORMAL
PATH REPORT.GROSS SPEC: NORMAL

## 2018-05-10 ENCOUNTER — TELEPHONE (OUTPATIENT)
Dept: GASTROENTEROLOGY | Facility: CLINIC | Age: 43
End: 2018-05-10

## 2018-05-10 NOTE — TELEPHONE ENCOUNTER
I called Debbie to check on her.  Her symptoms have improved on the therapy.  She's not getting up in the middle of the night.  She's remaining off work until Monday.    Dr. Grant

## 2018-05-14 ENCOUNTER — TELEPHONE (OUTPATIENT)
Dept: GASTROENTEROLOGY | Facility: CLINIC | Age: 43
End: 2018-05-14

## 2018-05-14 NOTE — TELEPHONE ENCOUNTER
----- Message from Sergey Grant MD sent at 5/10/2018  6:06 AM EDT -----  Please call Debbie and inform her that the biopsies showed active Crohn's disease.  I hope she is improving with institution of her therapy again .  Dr. Grant

## 2018-05-28 ENCOUNTER — APPOINTMENT (OUTPATIENT)
Dept: CT IMAGING | Facility: HOSPITAL | Age: 43
End: 2018-05-28

## 2018-05-28 ENCOUNTER — HOSPITAL ENCOUNTER (EMERGENCY)
Facility: HOSPITAL | Age: 43
Discharge: HOME | End: 2018-05-28
Attending: EMERGENCY MEDICINE | Admitting: EMERGENCY MEDICINE

## 2018-05-28 ENCOUNTER — TELEPHONE (OUTPATIENT)
Dept: GASTROENTEROLOGY | Facility: CLINIC | Age: 43
End: 2018-05-28

## 2018-05-28 VITALS
HEART RATE: 88 BPM | HEIGHT: 64 IN | BODY MASS INDEX: 31.69 KG/M2 | WEIGHT: 185.63 LBS | OXYGEN SATURATION: 94 % | RESPIRATION RATE: 16 BRPM | DIASTOLIC BLOOD PRESSURE: 72 MMHG | SYSTOLIC BLOOD PRESSURE: 113 MMHG | TEMPERATURE: 98.6 F

## 2018-05-28 DIAGNOSIS — K50.919 EXACERBATION OF CROHN'S DISEASE WITH COMPLICATION (HCC): Primary | ICD-10-CM

## 2018-05-28 LAB
ALBUMIN SERPL-MCNC: 4.2 G/DL (ref 3.2–4.8)
ALBUMIN/GLOB SERPL: 1.3 G/DL (ref 1.5–2.5)
ALP SERPL-CCNC: 99 U/L (ref 25–100)
ALT SERPL W P-5'-P-CCNC: 67 U/L (ref 7–40)
ANION GAP SERPL CALCULATED.3IONS-SCNC: 12 MMOL/L (ref 3–11)
AST SERPL-CCNC: 30 U/L (ref 0–33)
B-HCG UR QL: NEGATIVE
BACTERIA UR QL AUTO: ABNORMAL /HPF
BASOPHILS # BLD AUTO: 0.02 10*3/MM3 (ref 0–0.2)
BASOPHILS NFR BLD AUTO: 0.2 % (ref 0–1)
BILIRUB SERPL-MCNC: 0.2 MG/DL (ref 0.3–1.2)
BILIRUB UR QL STRIP: NEGATIVE
BUN BLD-MCNC: 9 MG/DL (ref 9–23)
BUN/CREAT SERPL: 11.3 (ref 7–25)
CALCIUM SPEC-SCNC: 9.3 MG/DL (ref 8.7–10.4)
CHLORIDE SERPL-SCNC: 100 MMOL/L (ref 99–109)
CLARITY UR: ABNORMAL
CO2 SERPL-SCNC: 25 MMOL/L (ref 20–31)
COLOR UR: ABNORMAL
CREAT BLD-MCNC: 0.8 MG/DL (ref 0.6–1.3)
DEPRECATED RDW RBC AUTO: 47.5 FL (ref 37–54)
EOSINOPHIL # BLD AUTO: 0.01 10*3/MM3 (ref 0–0.3)
EOSINOPHIL NFR BLD AUTO: 0.1 % (ref 0–3)
ERYTHROCYTE [DISTWIDTH] IN BLOOD BY AUTOMATED COUNT: 14.1 % (ref 11.3–14.5)
GFR SERPL CREATININE-BSD FRML MDRD: 79 ML/MIN/1.73
GLOBULIN UR ELPH-MCNC: 3.2 GM/DL
GLUCOSE BLD-MCNC: 118 MG/DL (ref 70–100)
GLUCOSE UR STRIP-MCNC: NEGATIVE MG/DL
HCT VFR BLD AUTO: 40.3 % (ref 34.5–44)
HGB BLD-MCNC: 13.4 G/DL (ref 11.5–15.5)
HGB UR QL STRIP.AUTO: ABNORMAL
HOLD SPECIMEN: NORMAL
HOLD SPECIMEN: NORMAL
HYALINE CASTS UR QL AUTO: ABNORMAL /LPF
IMM GRANULOCYTES # BLD: 0.07 10*3/MM3 (ref 0–0.03)
IMM GRANULOCYTES NFR BLD: 0.5 % (ref 0–0.6)
INTERNAL NEGATIVE CONTROL: NEGATIVE
INTERNAL POSITIVE CONTROL: POSITIVE
KETONES UR QL STRIP: ABNORMAL
LEUKOCYTE ESTERASE UR QL STRIP.AUTO: NEGATIVE
LIPASE SERPL-CCNC: 24 U/L (ref 6–51)
LYMPHOCYTES # BLD AUTO: 1.99 10*3/MM3 (ref 0.6–4.8)
LYMPHOCYTES NFR BLD AUTO: 15.6 % (ref 24–44)
Lab: NORMAL
MCH RBC QN AUTO: 30.7 PG (ref 27–31)
MCHC RBC AUTO-ENTMCNC: 33.3 G/DL (ref 32–36)
MCV RBC AUTO: 92.4 FL (ref 80–99)
MONOCYTES # BLD AUTO: 1.1 10*3/MM3 (ref 0–1)
MONOCYTES NFR BLD AUTO: 8.6 % (ref 0–12)
MUCOUS THREADS URNS QL MICRO: ABNORMAL /HPF
NEUTROPHILS # BLD AUTO: 9.63 10*3/MM3 (ref 1.5–8.3)
NEUTROPHILS NFR BLD AUTO: 75.5 % (ref 41–71)
NITRITE UR QL STRIP: NEGATIVE
PH UR STRIP.AUTO: 5.5 [PH] (ref 5–8)
PLATELET # BLD AUTO: 508 10*3/MM3 (ref 150–450)
PMV BLD AUTO: 9.1 FL (ref 6–12)
POTASSIUM BLD-SCNC: 3.5 MMOL/L (ref 3.5–5.5)
PROT SERPL-MCNC: 7.4 G/DL (ref 5.7–8.2)
PROT UR QL STRIP: ABNORMAL
RBC # BLD AUTO: 4.36 10*6/MM3 (ref 3.89–5.14)
RBC # UR: ABNORMAL /HPF
REF LAB TEST METHOD: ABNORMAL
SODIUM BLD-SCNC: 137 MMOL/L (ref 132–146)
SP GR UR STRIP: 1.04 (ref 1–1.03)
SQUAMOUS #/AREA URNS HPF: ABNORMAL /HPF
UROBILINOGEN UR QL STRIP: ABNORMAL
WBC NRBC COR # BLD: 12.75 10*3/MM3 (ref 3.5–10.8)
WBC UR QL AUTO: ABNORMAL /HPF
WHOLE BLOOD HOLD SPECIMEN: NORMAL
WHOLE BLOOD HOLD SPECIMEN: NORMAL

## 2018-05-28 PROCEDURE — 99283 EMERGENCY DEPT VISIT LOW MDM: CPT

## 2018-05-28 PROCEDURE — 74177 CT ABD & PELVIS W/CONTRAST: CPT

## 2018-05-28 PROCEDURE — 25010000002 MORPHINE PER 10 MG: Performed by: EMERGENCY MEDICINE

## 2018-05-28 PROCEDURE — 83690 ASSAY OF LIPASE: CPT

## 2018-05-28 PROCEDURE — 96376 TX/PRO/DX INJ SAME DRUG ADON: CPT

## 2018-05-28 PROCEDURE — 96375 TX/PRO/DX INJ NEW DRUG ADDON: CPT

## 2018-05-28 PROCEDURE — 80053 COMPREHEN METABOLIC PANEL: CPT

## 2018-05-28 PROCEDURE — 85025 COMPLETE CBC W/AUTO DIFF WBC: CPT

## 2018-05-28 PROCEDURE — 25010000002 IOPAMIDOL 61 % SOLUTION: Performed by: EMERGENCY MEDICINE

## 2018-05-28 PROCEDURE — 25010000002 MORPHINE SULFATE (PF) 2 MG/ML SOLUTION: Performed by: EMERGENCY MEDICINE

## 2018-05-28 PROCEDURE — 81001 URINALYSIS AUTO W/SCOPE: CPT

## 2018-05-28 PROCEDURE — 96374 THER/PROPH/DIAG INJ IV PUSH: CPT

## 2018-05-28 PROCEDURE — 36415 COLL VENOUS BLD VENIPUNCTURE: CPT

## 2018-05-28 PROCEDURE — 25010000002 ONDANSETRON PER 1 MG: Performed by: EMERGENCY MEDICINE

## 2018-05-28 PROCEDURE — 96361 HYDRATE IV INFUSION ADD-ON: CPT

## 2018-05-28 RX ORDER — ONDANSETRON 2 MG/ML
4 INJECTION INTRAMUSCULAR; INTRAVENOUS ONCE
Status: COMPLETED | OUTPATIENT
Start: 2018-05-28 | End: 2018-05-28

## 2018-05-28 RX ORDER — MORPHINE SULFATE 2 MG/ML
4 INJECTION, SOLUTION INTRAMUSCULAR; INTRAVENOUS ONCE
Status: COMPLETED | OUTPATIENT
Start: 2018-05-28 | End: 2018-05-28

## 2018-05-28 RX ORDER — MESALAMINE 1.2 G/1
1200 TABLET, DELAYED RELEASE ORAL 4 TIMES DAILY
COMMUNITY
End: 2018-06-14 | Stop reason: SINTOL

## 2018-05-28 RX ORDER — MORPHINE SULFATE 4 MG/ML
4 INJECTION, SOLUTION INTRAMUSCULAR; INTRAVENOUS ONCE
Status: COMPLETED | OUTPATIENT
Start: 2018-05-28 | End: 2018-05-28

## 2018-05-28 RX ORDER — SODIUM CHLORIDE 0.9 % (FLUSH) 0.9 %
10 SYRINGE (ML) INJECTION AS NEEDED
Status: DISCONTINUED | OUTPATIENT
Start: 2018-05-28 | End: 2018-05-29 | Stop reason: HOSPADM

## 2018-05-28 RX ORDER — PREDNISONE 20 MG/1
5 TABLET ORAL DAILY
COMMUNITY
End: 2018-12-18

## 2018-05-28 RX ADMIN — IOPAMIDOL 98 ML: 612 INJECTION, SOLUTION INTRAVENOUS at 20:12

## 2018-05-28 RX ADMIN — MORPHINE SULFATE 4 MG: 4 INJECTION, SOLUTION INTRAMUSCULAR; INTRAVENOUS at 21:52

## 2018-05-28 RX ADMIN — MORPHINE SULFATE 4 MG: 10 INJECTION INTRAVENOUS at 19:59

## 2018-05-28 RX ADMIN — SODIUM CHLORIDE 1000 ML: 9 INJECTION, SOLUTION INTRAVENOUS at 21:15

## 2018-05-28 RX ADMIN — SODIUM CHLORIDE 1000 ML: 9 INJECTION, SOLUTION INTRAVENOUS at 19:59

## 2018-05-28 RX ADMIN — ONDANSETRON 4 MG: 2 INJECTION, SOLUTION INTRAMUSCULAR; INTRAVENOUS at 20:00

## 2018-05-29 ENCOUNTER — OFFICE VISIT (OUTPATIENT)
Dept: GASTROENTEROLOGY | Facility: CLINIC | Age: 43
End: 2018-05-29

## 2018-05-29 VITALS
DIASTOLIC BLOOD PRESSURE: 74 MMHG | BODY MASS INDEX: 32.35 KG/M2 | WEIGHT: 189.5 LBS | RESPIRATION RATE: 16 BRPM | HEIGHT: 64 IN | TEMPERATURE: 98.4 F | OXYGEN SATURATION: 99 % | SYSTOLIC BLOOD PRESSURE: 116 MMHG | HEART RATE: 89 BPM

## 2018-05-29 DIAGNOSIS — K50.111 CROHN'S DISEASE OF LARGE INTESTINE WITH RECTAL BLEEDING (HCC): Primary | ICD-10-CM

## 2018-05-29 PROCEDURE — 99214 OFFICE O/P EST MOD 30 MIN: CPT | Performed by: INTERNAL MEDICINE

## 2018-05-29 NOTE — PROGRESS NOTES
Chief Complaint: Debbie is here today to follow-up with regards to her flare in her Crohn's disease.      HPI: Debbie is an old patient of mine.  I actually diagnosed her when she was in her teens with Crohn's disease.  It was confined to her colon.  She had numerous rounds of steroids and mesalamine's.  She finally came under excellent control on Imuran and her other medications were discontinued.  She was in remission for years.  In 2013 she was diagnosed with having breast cancer.  Dr. Leahy took care of her.  Her Imuran was stopped at that time.  She remained in remission until spring of this year.  She developed some nonspecific GI symptoms.  She finally contacted us and was colonoscoped earlier this month.  She had a recurrence of her disease.  Most of her disease was in the right side of her colon in the past but she did have some sigmoid and descending colon involvement.  She was begun on Lialda as well as 30 mg of prednisone.  Her symptoms intensify.  She was seen actually seen in the emergency room yesterday.  I reviewed her CT scan and laboratory studies.  Her CBC metabolic profile did not look too abnormal.  Her CT scan showed colitis mostly in the right side and transverse colon.  She is here today to discuss further therapy.  She remains afebrile.  Her studies from the emergency room were again reviewed with her and by myself.      Past Medical History:   Past Medical History:   Diagnosis Date   • Breast cancer    • Cancer     breast cancer   • Crohn's disease        Family History:  Family History   Problem Relation Age of Onset   • Hypertension Other    • Heart disease Other    • Hyperlipidemia Other    • Ovarian cancer Neg Hx    • Breast cancer Neg Hx        Social History:   reports that she quit smoking about 8 years ago. Her smoking use included Cigarettes. She has never used smokeless tobacco. She reports that she drinks about 4.2 oz of alcohol per week . She reports that she does not use  drugs.    Medications:     Current Outpatient Prescriptions:   •  mesalamine (LIALDA) 1.2 g EC tablet, Take 1,200 mg by mouth 4 (Four) Times a Day. Take four one time daily, Disp: , Rfl:   •  predniSONE (DELTASONE) 20 MG tablet, Take 30 mg by mouth Daily., Disp: , Rfl:   •  tamoxifen (NOLVADEX) 20 MG chemo tablet, TAKE 1 TABLET BY MOUTH  DAILY, Disp: 30 tablet, Rfl: 11  No current facility-administered medications for this visit.     Allergies:  Epinephrine and Prilosec [omeprazole]    Review of Systems   Constitutional: Positive for fatigue, fever and unexpected weight change.   Gastrointestinal: Positive for abdominal pain (range from 5 to 9 on scale of 10), anal bleeding (Maybe), diarrhea and nausea.   Neurological: Positive for weakness (Arms and Legs).   All other systems reviewed and are negative.           Physical Exam:   Constitutional: Pt is oriented to person, place, and time and well-developed, well-nourished, and in no distress.   HENT: Mouth/Throat: Oropharynx is clear and moist.   Neck: Normal range of motion. Neck supple.   Cardiovascular: Normal rate, regular rhythm and normal heart sounds. No murmurs rubs or gallops  Pulmonary/Chest: Effort normal and breath sounds normal. No respiratory distress. No  wheezes.   Abdominal: Soft. Bowel sounds are normal.  there is no peritoneal signs but she is diffusely tender.  No hepatosplenomegaly   Skin: Skin is warm and dry.   Psychiatric: Mood, memory, affect and judgment normal.   Gait: Normal        Assessment and Plan Debbie has had a significant flare in her Crohn's disease.  Because of the, current use of Humira with Imuran I was hesitant to institute this.  Instead we are going to opt for Entyvio and we will arrange for it to be authorized.  I've also increased her prednisone to 50 mg a day.  I've kept her off work until June 11 when she starts vacation.  She's having 6-8 bowel movements per day with blood.  Certainly does not look toxic at this time.   She is to contact us if her symptoms worsen.  The Entyvio will be infused at 0 weeks 2 and then 6 weeks.  She'll then get it every 8 weeks.  If she has not responded by week 14 her to make arrangements for an alternative biological agent.  She was given prescriptions for Zofran as well as 10 mg prednisone pills to make it easier for her to take the higher dose.  The weakness in her arms and legs may be related to the prednisone.  All of this was discussed in detail.  A handout from up-to-date was given to her on Entyvio and reviewed with her.        ICD-10-CM ICD-9-CM   1. Crohn's disease of large intestine with rectal bleeding K50.111 555.1     Sergey Grant M.D.  [facility]  [phone]  Gastroenterology     EMR Dragon/Transcription disclaimer:   Much of this encounter note is an electronic transcription/translation of spoken language to printed text. The electronic translation of spoken language may permit erroneous, or at times, nonsensical words or phrases to be inadvertently transcribed; Although I have reviewed the note for such errors, some may still exist.

## 2018-06-04 ENCOUNTER — TELEPHONE (OUTPATIENT)
Dept: GASTROENTEROLOGY | Facility: CLINIC | Age: 43
End: 2018-06-04

## 2018-06-04 NOTE — TELEPHONE ENCOUNTER
Please tell her that I am glad she feels better. She can use some tylenol for the aches.    Dr. Grant

## 2018-06-04 NOTE — TELEPHONE ENCOUNTER
Patient called stated that she feels better, but she states since starting the prednisone her body aches.

## 2018-06-08 PROBLEM — K50.111 CROHN'S DISEASE OF LARGE INTESTINE WITH RECTAL BLEEDING (HCC): Status: ACTIVE | Noted: 2018-06-08

## 2018-06-08 RX ORDER — SODIUM CHLORIDE 9 MG/ML
250 INJECTION, SOLUTION INTRAVENOUS ONCE
Status: CANCELLED | OUTPATIENT
Start: 2018-09-17

## 2018-06-08 RX ORDER — SODIUM CHLORIDE 9 MG/ML
250 INJECTION, SOLUTION INTRAVENOUS ONCE
Status: CANCELLED | OUTPATIENT
Start: 2018-06-11

## 2018-06-11 ENCOUNTER — INFUSION (OUTPATIENT)
Dept: ONCOLOGY | Facility: HOSPITAL | Age: 43
End: 2018-06-11

## 2018-06-11 ENCOUNTER — TELEPHONE (OUTPATIENT)
Dept: GASTROENTEROLOGY | Facility: CLINIC | Age: 43
End: 2018-06-11

## 2018-06-11 DIAGNOSIS — K50.111 CROHN'S DISEASE OF LARGE INTESTINE WITH RECTAL BLEEDING (HCC): ICD-10-CM

## 2018-06-11 RX ORDER — SODIUM CHLORIDE 9 MG/ML
250 INJECTION, SOLUTION INTRAVENOUS ONCE
Status: CANCELLED | OUTPATIENT
Start: 2018-06-11

## 2018-06-11 NOTE — TELEPHONE ENCOUNTER
Called patient back. She would like to speak with . I told her that I would let him know tomorrow. She stated that would be fine.

## 2018-06-11 NOTE — TELEPHONE ENCOUNTER
Birgit,  Patient called and left message on nurse line. She would like to talk to you regarding Infusion Cancellation today. Thanks

## 2018-06-12 ENCOUNTER — TELEPHONE (OUTPATIENT)
Dept: GASTROENTEROLOGY | Facility: CLINIC | Age: 43
End: 2018-06-12

## 2018-06-12 NOTE — TELEPHONE ENCOUNTER
I spoke with Debbie about the mistake that was made with regards to her infusion.  There is a problem with her preauthorization.  She is asymptomatic on her prednisone and is scheduled to have very Entyvio on Thursday.      Dr. Grant

## 2018-06-14 ENCOUNTER — INFUSION (OUTPATIENT)
Dept: ONCOLOGY | Facility: HOSPITAL | Age: 43
End: 2018-06-14

## 2018-06-14 VITALS
HEIGHT: 64 IN | WEIGHT: 195 LBS | BODY MASS INDEX: 33.29 KG/M2 | RESPIRATION RATE: 16 BRPM | SYSTOLIC BLOOD PRESSURE: 136 MMHG | TEMPERATURE: 98.3 F | DIASTOLIC BLOOD PRESSURE: 78 MMHG | HEART RATE: 107 BPM

## 2018-06-14 DIAGNOSIS — K50.111 CROHN'S DISEASE OF LARGE INTESTINE WITH RECTAL BLEEDING (HCC): Primary | ICD-10-CM

## 2018-06-14 PROCEDURE — 25010000002 VEDOLIZUMAB 300 MG RECONSTITUTED SOLUTION 300 MG VIAL

## 2018-06-14 PROCEDURE — 96365 THER/PROPH/DIAG IV INF INIT: CPT

## 2018-06-14 RX ORDER — SODIUM CHLORIDE 9 MG/ML
250 INJECTION, SOLUTION INTRAVENOUS ONCE
Status: CANCELLED | OUTPATIENT
Start: 2018-07-23

## 2018-06-14 RX ORDER — SODIUM CHLORIDE 9 MG/ML
250 INJECTION, SOLUTION INTRAVENOUS ONCE
Status: DISCONTINUED | OUTPATIENT
Start: 2018-06-14 | End: 2018-06-15 | Stop reason: HOSPADM

## 2018-06-28 ENCOUNTER — INFUSION (OUTPATIENT)
Dept: ONCOLOGY | Facility: HOSPITAL | Age: 43
End: 2018-06-28

## 2018-06-28 VITALS
BODY MASS INDEX: 34.66 KG/M2 | RESPIRATION RATE: 16 BRPM | WEIGHT: 203 LBS | HEIGHT: 64 IN | TEMPERATURE: 98.8 F | SYSTOLIC BLOOD PRESSURE: 121 MMHG | HEART RATE: 94 BPM | DIASTOLIC BLOOD PRESSURE: 74 MMHG

## 2018-06-28 DIAGNOSIS — K50.111 CROHN'S DISEASE OF LARGE INTESTINE WITH RECTAL BLEEDING (HCC): Primary | ICD-10-CM

## 2018-06-28 PROCEDURE — 25010000002 VEDOLIZUMAB 300 MG RECONSTITUTED SOLUTION 300 MG VIAL: Performed by: INTERNAL MEDICINE

## 2018-06-28 PROCEDURE — 96413 CHEMO IV INFUSION 1 HR: CPT

## 2018-06-28 PROCEDURE — 96365 THER/PROPH/DIAG IV INF INIT: CPT

## 2018-06-28 RX ORDER — SODIUM CHLORIDE 9 MG/ML
250 INJECTION, SOLUTION INTRAVENOUS ONCE
Status: COMPLETED | OUTPATIENT
Start: 2018-06-28 | End: 2018-06-28

## 2018-06-28 RX ORDER — SODIUM CHLORIDE 9 MG/ML
250 INJECTION, SOLUTION INTRAVENOUS ONCE
Status: CANCELLED | OUTPATIENT
Start: 2018-07-23

## 2018-06-28 RX ADMIN — SODIUM CHLORIDE 500 ML: 9 INJECTION, SOLUTION INTRAVENOUS at 15:14

## 2018-06-28 RX ADMIN — VEDOLIZUMAB 300 MG: 300 INJECTION, POWDER, LYOPHILIZED, FOR SOLUTION INTRAVENOUS at 15:15

## 2018-07-03 ENCOUNTER — TELEPHONE (OUTPATIENT)
Dept: GASTROENTEROLOGY | Facility: CLINIC | Age: 43
End: 2018-07-03

## 2018-07-03 NOTE — TELEPHONE ENCOUNTER
Patient called stated that since starting the Entyvio she has been having rosi horses in her hands, feet and legs. She is also on Tamoxifen and she states when she started that she had the same symptoms. She is wondering if the Entyvio is messing with the Tamoxifen?

## 2018-07-03 NOTE — TELEPHONE ENCOUNTER
I called Debbie back.  She is drinking some tonic water per my advice.  She is eating to bananas a day.  She's had this before.  She's had no other problems with the Entyvio.  I'm not sure if it's related to her tamoxifen or just her.  I also decreased her prednisone to 40 mg a day.    Dr. Grant

## 2018-07-17 ENCOUNTER — OFFICE VISIT (OUTPATIENT)
Dept: GASTROENTEROLOGY | Facility: CLINIC | Age: 43
End: 2018-07-17

## 2018-07-17 ENCOUNTER — APPOINTMENT (OUTPATIENT)
Dept: LAB | Facility: HOSPITAL | Age: 43
End: 2018-07-17

## 2018-07-17 VITALS
HEART RATE: 94 BPM | BODY MASS INDEX: 35.34 KG/M2 | DIASTOLIC BLOOD PRESSURE: 100 MMHG | SYSTOLIC BLOOD PRESSURE: 148 MMHG | TEMPERATURE: 98.8 F | WEIGHT: 207 LBS | HEIGHT: 64 IN | OXYGEN SATURATION: 98 %

## 2018-07-17 DIAGNOSIS — K50.111 CROHN'S DISEASE OF LARGE INTESTINE WITH RECTAL BLEEDING (HCC): Primary | ICD-10-CM

## 2018-07-17 LAB
ALBUMIN SERPL-MCNC: 4.19 G/DL (ref 3.2–4.8)
ALBUMIN/GLOB SERPL: 1.9 G/DL (ref 1.5–2.5)
ALP SERPL-CCNC: 60 U/L (ref 25–100)
ALT SERPL W P-5'-P-CCNC: 41 U/L (ref 7–40)
ANION GAP SERPL CALCULATED.3IONS-SCNC: 7 MMOL/L (ref 3–11)
AST SERPL-CCNC: 19 U/L (ref 0–33)
BILIRUB SERPL-MCNC: 0.4 MG/DL (ref 0.3–1.2)
BUN BLD-MCNC: 26 MG/DL (ref 9–23)
BUN/CREAT SERPL: 32.1 (ref 7–25)
CALCIUM SPEC-SCNC: 9.1 MG/DL (ref 8.7–10.4)
CHLORIDE SERPL-SCNC: 101 MMOL/L (ref 99–109)
CO2 SERPL-SCNC: 30 MMOL/L (ref 20–31)
CREAT BLD-MCNC: 0.81 MG/DL (ref 0.6–1.3)
GFR SERPL CREATININE-BSD FRML MDRD: 77 ML/MIN/1.73
GLOBULIN UR ELPH-MCNC: 2.2 GM/DL
GLUCOSE BLD-MCNC: 108 MG/DL (ref 70–100)
POTASSIUM BLD-SCNC: 4.5 MMOL/L (ref 3.5–5.5)
PROT SERPL-MCNC: 6.4 G/DL (ref 5.7–8.2)
SODIUM BLD-SCNC: 138 MMOL/L (ref 132–146)
VIT B12 BLD-MCNC: 144 PG/ML (ref 211–911)

## 2018-07-17 PROCEDURE — 99214 OFFICE O/P EST MOD 30 MIN: CPT | Performed by: INTERNAL MEDICINE

## 2018-07-17 PROCEDURE — 80053 COMPREHEN METABOLIC PANEL: CPT | Performed by: INTERNAL MEDICINE

## 2018-07-17 PROCEDURE — 82607 VITAMIN B-12: CPT | Performed by: INTERNAL MEDICINE

## 2018-07-17 PROCEDURE — 36415 COLL VENOUS BLD VENIPUNCTURE: CPT | Performed by: INTERNAL MEDICINE

## 2018-07-17 RX ORDER — LORATADINE 10 MG/1
10 TABLET ORAL DAILY
COMMUNITY
End: 2021-09-03

## 2018-07-17 NOTE — PROGRESS NOTES
Chief Complaint: Debbie is here today for follow-up of her Crohn's disease.      HPI: Debbie recently had a severe flare in her Crohn's disease.  She was seen in the emergency room back in May.  She had been in good control on Imuran but while she was on that developed breast cancer.  Her immunosuppressant was discontinued.  She again remained in remission for years until May of this year.  She was begun on prednisone grams a day.  She is down to 40 mg a day.  She was also begun on Entyvio.  She's had 2 infusions.  She's had no serious site reactions although her mother said that she may have a little bit of a list.  I again spent a long time counseling her to be careful with neurological symptoms related to this medication.  He is having no diarrhea she's having no blood in her stools she's back at work and feels quite well at this time      Past Medical History:   Past Medical History:   Diagnosis Date   • Breast cancer (CMS/HCC)    • Cancer (CMS/HCC)     breast cancer   • Crohn's disease (CMS/HCC)        Family History:  Family History   Problem Relation Age of Onset   • Hypertension Other    • Heart disease Other    • Hyperlipidemia Other    • Ovarian cancer Neg Hx    • Breast cancer Neg Hx        Social History:   reports that she quit smoking about 8 years ago. Her smoking use included Cigarettes. She has never used smokeless tobacco. She reports that she drinks about 4.2 oz of alcohol per week . She reports that she does not use drugs.    Medications:     Current Outpatient Prescriptions:   •  loratadine (CLARITIN) 10 MG tablet, Take 10 mg by mouth Daily., Disp: , Rfl:   •  predniSONE (DELTASONE) 20 MG tablet, Take 30 mg by mouth Daily., Disp: , Rfl:   •  tamoxifen (NOLVADEX) 20 MG chemo tablet, TAKE 1 TABLET BY MOUTH  DAILY, Disp: 30 tablet, Rfl: 11    Allergies:  Lialda [mesalamine]; Epinephrine; and Prilosec [omeprazole]    Review of Systems   All other systems reviewed and are  negative.            Physical Exam:   Constitutional: Pt is oriented to person, place, and time and well-developed, well-nourished, and in no distress.   HENT: Mouth/Throat: Oropharynx is clear and moist.   Neck: Normal range of motion. Neck supple.   Cardiovascular: Normal rate, regular rhythm and normal heart sounds.   no murmurs rubs or gallops were appreciated  Pulmonary/Chest: Effort normal and breath sounds normal. No respiratory distress. No  wheezes.   Abdominal: Soft. Bowel sounds are normal.  she has what appears and feels like a lipoma around her umbilicus it does not reduce I do not think it's a hernia   Skin: Skin is warm and dry.   Psychiatric: Mood, memory, affect and judgment normal.   Gait is normal        Assessment and Plan Debbie recently had a flare in her Crohn's disease.  She is down to 40 mg of prednisone.  She's had 2 infusions of Entyvio.  She'll receive her third infusion and then go on maintenance every 8 weeks.  She's also complaining of some hand cramping.  I'll check her electrolytes today.  Her mother 1 and a B12 level checked.  She's never had small bowel disease but I will add this to her laboratory studies.  She was instructed to taper her prednisone down to 30 mg for 2 weeks 20 mg for 2 weeks 10 mg for 2 weeks 5 mg for 2 weeks.  I'll see her back at that time.  She'll notify us if there is any change in her neurological status with the Entyvio or if her disease flares with the lowering of the prednisone dose        ICD-10-CM ICD-9-CM   1. Crohn's disease of large intestine with rectal bleeding (CMS/[facility]) K50.111 555.1     Sergey Grant M.D.  [facility]  [phone]  Gastroenterology     EMR Dragon/Transcription disclaimer:   Much of this encounter note is an electronic transcription/translation of spoken language to printed text. The electronic translation of spoken language may permit erroneous, or at times, nonsensical words or phrases to be inadvertently  transcribed; Although I have reviewed the note for such errors, some may still exist.

## 2018-07-18 ENCOUNTER — TELEPHONE (OUTPATIENT)
Dept: GASTROENTEROLOGY | Facility: CLINIC | Age: 43
End: 2018-07-18

## 2018-07-18 NOTE — TELEPHONE ENCOUNTER
----- Message from Sergey Grant MD sent at 7/17/2018  9:27 PM EDT -----  Please call Debbie and inform her that her potassium is normal. Her B is low . Dr. Grant

## 2018-07-19 ENCOUNTER — CLINICAL SUPPORT (OUTPATIENT)
Dept: GASTROENTEROLOGY | Facility: CLINIC | Age: 43
End: 2018-07-19

## 2018-07-19 ENCOUNTER — TELEPHONE (OUTPATIENT)
Dept: GASTROENTEROLOGY | Facility: CLINIC | Age: 43
End: 2018-07-19

## 2018-07-19 DIAGNOSIS — E53.8 VITAMIN B12 DEFICIENCY: Primary | ICD-10-CM

## 2018-07-19 DIAGNOSIS — K50.111 CROHN'S DISEASE OF LARGE INTESTINE WITH RECTAL BLEEDING (HCC): Primary | ICD-10-CM

## 2018-07-19 DIAGNOSIS — E53.8 VITAMIN B 12 DEFICIENCY: Primary | ICD-10-CM

## 2018-07-19 PROCEDURE — 96372 THER/PROPH/DIAG INJ SC/IM: CPT | Performed by: INTERNAL MEDICINE

## 2018-07-19 RX ORDER — CYANOCOBALAMIN 1000 UG/ML
1000 INJECTION, SOLUTION INTRAMUSCULAR; SUBCUTANEOUS ONCE
Status: COMPLETED | OUTPATIENT
Start: 2018-07-19 | End: 2018-07-19

## 2018-07-19 RX ORDER — SYRINGE-NEEDLE,INSULIN,0.5 ML 28GX1/2"
SYRINGE, EMPTY DISPOSABLE MISCELLANEOUS
Qty: 24 EACH | Refills: 0 | Status: SHIPPED | OUTPATIENT
Start: 2018-07-19 | End: 2019-08-21 | Stop reason: SDUPTHER

## 2018-07-19 RX ADMIN — CYANOCOBALAMIN 1000 MCG: 1000 INJECTION, SOLUTION INTRAMUSCULAR; SUBCUTANEOUS at 11:50

## 2018-07-19 NOTE — TELEPHONE ENCOUNTER
I called Debbie and told her that I would leave a prescription for her for her B12 as well as prescription for some needles.  Her mother's and nurse between the 2 of them I'm sure they will be able to handle the injections.  Also I will arrange for small bowel follow through to be completed in the future.    Dr. Grant

## 2018-07-20 ENCOUNTER — TELEPHONE (OUTPATIENT)
Dept: GASTROENTEROLOGY | Facility: CLINIC | Age: 43
End: 2018-07-20

## 2018-07-20 NOTE — TELEPHONE ENCOUNTER
Birgit,  Patient called. She is requesting that Dr Grant do a Peer to Peer to try and get Entyvio infusion approved. [facility] [phone]. Patient know that Dr Grant will not be back into the office until Tuesday. Thanks

## 2018-07-24 ENCOUNTER — HOSPITAL ENCOUNTER (OUTPATIENT)
Dept: GENERAL RADIOLOGY | Facility: HOSPITAL | Age: 43
Discharge: HOME | End: 2018-07-24
Attending: INTERNAL MEDICINE | Admitting: INTERNAL MEDICINE

## 2018-07-24 DIAGNOSIS — K50.111 CROHN'S DISEASE OF LARGE INTESTINE WITH RECTAL BLEEDING (HCC): ICD-10-CM

## 2018-07-24 PROCEDURE — 74250 X-RAY XM SM INT 1CNTRST STD: CPT

## 2018-07-24 RX ADMIN — BARIUM SULFATE 360 ML: 960 POWDER, FOR SUSPENSION ORAL at 11:00

## 2018-07-25 ENCOUNTER — TELEPHONE (OUTPATIENT)
Dept: GASTROENTEROLOGY | Facility: CLINIC | Age: 43
End: 2018-07-25

## 2018-07-25 NOTE — TELEPHONE ENCOUNTER
Insurance called to let us know that Thursdays Entyvio infusion was approved to be preformed at [facility].

## 2018-07-26 ENCOUNTER — INFUSION (OUTPATIENT)
Dept: ONCOLOGY | Facility: HOSPITAL | Age: 43
End: 2018-07-26

## 2018-07-26 ENCOUNTER — TELEPHONE (OUTPATIENT)
Dept: GASTROENTEROLOGY | Facility: CLINIC | Age: 43
End: 2018-07-26

## 2018-07-26 VITALS
WEIGHT: 210 LBS | DIASTOLIC BLOOD PRESSURE: 78 MMHG | RESPIRATION RATE: 16 BRPM | TEMPERATURE: 98.9 F | HEART RATE: 97 BPM | BODY MASS INDEX: 35.85 KG/M2 | SYSTOLIC BLOOD PRESSURE: 129 MMHG | HEIGHT: 64 IN

## 2018-07-26 DIAGNOSIS — K50.111 CROHN'S DISEASE OF LARGE INTESTINE WITH RECTAL BLEEDING (HCC): Primary | ICD-10-CM

## 2018-07-26 PROCEDURE — 25010000002 VEDOLIZUMAB 300 MG RECONSTITUTED SOLUTION 300 MG VIAL: Performed by: INTERNAL MEDICINE

## 2018-07-26 PROCEDURE — 96365 THER/PROPH/DIAG IV INF INIT: CPT

## 2018-07-26 RX ORDER — SODIUM CHLORIDE 9 MG/ML
250 INJECTION, SOLUTION INTRAVENOUS ONCE
Status: COMPLETED | OUTPATIENT
Start: 2018-07-26 | End: 2018-07-26

## 2018-07-26 RX ORDER — SODIUM CHLORIDE 9 MG/ML
250 INJECTION, SOLUTION INTRAVENOUS ONCE
Status: CANCELLED | OUTPATIENT
Start: 2018-07-26

## 2018-07-26 RX ADMIN — VEDOLIZUMAB 300 MG: 300 INJECTION, POWDER, LYOPHILIZED, FOR SOLUTION INTRAVENOUS at 15:17

## 2018-07-26 RX ADMIN — SODIUM CHLORIDE 250 ML: 9 INJECTION, SOLUTION INTRAVENOUS at 15:16

## 2018-07-26 NOTE — TELEPHONE ENCOUNTER
----- Message from Sergey Grant MD sent at 7/25/2018 12:18 PM EDT -----  Please let Debbie know that her small bowel series was normal.  Dr. Grant

## 2018-07-26 NOTE — TELEPHONE ENCOUNTER
Spoke with Debbie gave her the message. She wanted to let you know that her leg has a numbing sensation. She is still able to walk on it, but even to touch it feels numb. She will be in this afternoon for her second B-12.

## 2018-08-21 ENCOUNTER — TELEPHONE (OUTPATIENT)
Dept: GASTROENTEROLOGY | Facility: CLINIC | Age: 43
End: 2018-08-21

## 2018-08-21 DIAGNOSIS — K50.10 CROHN'S DISEASE OF LARGE INTESTINE WITHOUT COMPLICATION (HCC): Primary | ICD-10-CM

## 2018-08-21 NOTE — TELEPHONE ENCOUNTER
Dr. Grant,  Can we order a TB test for her to have done today? They home infusion requires this. Maybe a Quantiferion so we can get the result sooner. Or if you know a different blood draw.

## 2018-08-22 ENCOUNTER — APPOINTMENT (OUTPATIENT)
Dept: LAB | Facility: HOSPITAL | Age: 43
End: 2018-08-22

## 2018-08-22 PROCEDURE — 36415 COLL VENOUS BLD VENIPUNCTURE: CPT

## 2018-08-22 PROCEDURE — 86480 TB TEST CELL IMMUN MEASURE: CPT | Performed by: INTERNAL MEDICINE

## 2018-09-01 LAB
INTERPRETATION: NORMAL
M TB TUBERC IFN-G BLD QL: NEGATIVE
QFT TB AG MINUS NIL VALUE: 0.09 IU/ML
QUANTIFERON CRITERIA: NORMAL
QUANTIFERON MITOGEN VALUE: >10 IU/ML
QUANTIFERON NIL VALUE: 0.07 IU/ML
QUANTIFERON TB AG VALUE: 0.16 IU/ML

## 2018-09-05 ENCOUNTER — TELEPHONE (OUTPATIENT)
Dept: GASTROENTEROLOGY | Facility: CLINIC | Age: 43
End: 2018-09-05

## 2018-09-10 ENCOUNTER — TELEPHONE (OUTPATIENT)
Dept: GASTROENTEROLOGY | Facility: CLINIC | Age: 43
End: 2018-09-10

## 2018-09-10 NOTE — TELEPHONE ENCOUNTER
----- Message from Sergey Grant MD sent at 9/1/2018  8:49 AM EDT -----  Please let Debbie know her TB test is negative. Dr. Grant

## 2018-09-18 ENCOUNTER — OFFICE VISIT (OUTPATIENT)
Dept: GASTROENTEROLOGY | Facility: CLINIC | Age: 43
End: 2018-09-18

## 2018-09-18 VITALS
HEART RATE: 92 BPM | OXYGEN SATURATION: 99 % | SYSTOLIC BLOOD PRESSURE: 120 MMHG | RESPIRATION RATE: 16 BRPM | BODY MASS INDEX: 36.88 KG/M2 | HEIGHT: 64 IN | TEMPERATURE: 98.9 F | WEIGHT: 216 LBS | DIASTOLIC BLOOD PRESSURE: 76 MMHG

## 2018-09-18 DIAGNOSIS — E53.8 VITAMIN B12 DEFICIENCY: ICD-10-CM

## 2018-09-18 DIAGNOSIS — K50.10 CROHN'S DISEASE OF LARGE INTESTINE WITHOUT COMPLICATION (HCC): Primary | ICD-10-CM

## 2018-09-18 PROCEDURE — 99214 OFFICE O/P EST MOD 30 MIN: CPT | Performed by: INTERNAL MEDICINE

## 2018-09-18 NOTE — PROGRESS NOTES
Chief Complaint: Debbie is here today for follow-up of her Crohn's disease.      HPI Debbie has had Crohn's disease since a child.  I've assisted her in her care since then.  She's had 3 doses of her Entyvio.  She is due for her first 8 week interval infusion tomorrow.  She's having no diarrhea or bleeding.  She had an episode of diarrhea on Sunday which resolved within 24 hours.  There's been no blood in her stool she's not had severe pain or cramping.  She is down to 5 mg of prednisone a day.  She's had no systemic complaints of headaches or other neurological problems related to her Entyvio.  He is maintained on B12.  She's never had small bowel involvement but this was confirmed by laboratory studies.    Past Medical History:   Past Medical History:   Diagnosis Date   • Breast cancer (CMS/HCC)    • Cancer (CMS/HCC)     breast cancer   • Crohn's disease (CMS/HCC)        Family History:  Family History   Problem Relation Age of Onset   • Hypertension Other    • Heart disease Other    • Hyperlipidemia Other    • Ovarian cancer Neg Hx    • Breast cancer Neg Hx        Social History:   reports that she quit smoking about 8 years ago. Her smoking use included Cigarettes. She has never used smokeless tobacco. She reports that she drinks about 4.2 oz of alcohol per week . She reports that she does not use drugs.    Medications:     Current Outpatient Prescriptions:   •  Cyanocobalamin (B-12 COMPLIANCE INJECTION) 1000 MCG/ML kit, Inject 1,000 mcg as directed Every 30 (Thirty) Days., Disp: 1 kit, Rfl: 12  •  loratadine (CLARITIN) 10 MG tablet, Take 10 mg by mouth Daily., Disp: , Rfl:   •  Needles & Syringes (TB SYRINGE 1 ML) misc, Use with B-12 injection, Disp: 24 each, Rfl: 0  •  predniSONE (DELTASONE) 20 MG tablet, Take 5 mg by mouth Daily., Disp: , Rfl:   •  tamoxifen (NOLVADEX) 20 MG chemo tablet, TAKE 1 TABLET BY MOUTH  DAILY, Disp: 30 tablet, Rfl: 11    Allergies:  Lialda [mesalamine]; Epinephrine; and Prilosec  [omeprazole]    Review of Systems   Gastrointestinal: Positive for diarrhea.   All other systems reviewed and are negative.            Physical Exam:   Constitutional: Pt is oriented to person, place, and time and well-developed, well-nourished, and in no distress.   HENT: Mouth/Throat: Oropharynx is clear and moist.   Neck: Normal range of motion. Neck supple.   Cardiovascular: Normal rate, regular rhythm and normal heart sounds.    Pulmonary/Chest: Effort normal and breath sounds normal. No respiratory distress. No  wheezes.   Abdominal: Soft. Bowel sounds are normal.   Skin: Skin is warm and dry.   Psychiatric: Mood, memory, affect and judgment normal.           Assessment and Plan Debbie is doing well on her Entyvio.  Arrangements be made for her to see us in 3 months time.  I've asked her to take 5 mg of prednisone every other day for 2 weeks and discontinue it.  He was advised to get her flu shot.  She contact us if she has problems later to her Crohn's disease.        ICD-10-CM ICD-9-CM   1. Crohn's disease of large intestine without complication (CMS/[facility]) K50.10 555.1   2. Vitamin B12 deficiency E53.8 266.2     Sergey Grant M.D.  [facility]  [phone]  Gastroenterology     EMR Dragon/Transcription disclaimer:   Much of this encounter note is an electronic transcription/translation of spoken language to printed text. The electronic translation of spoken language may permit erroneous, or at times, nonsensical words or phrases to be inadvertently transcribed; Although I have reviewed the note for such errors, some may still exist.

## 2018-09-20 ENCOUNTER — APPOINTMENT (OUTPATIENT)
Dept: ONCOLOGY | Facility: HOSPITAL | Age: 43
End: 2018-09-20

## 2018-09-21 ENCOUNTER — TELEPHONE (OUTPATIENT)
Dept: GASTROENTEROLOGY | Facility: CLINIC | Age: 43
End: 2018-09-21

## 2018-10-24 ENCOUNTER — APPOINTMENT (OUTPATIENT)
Dept: LAB | Facility: HOSPITAL | Age: 43
End: 2018-10-24

## 2018-10-24 ENCOUNTER — OFFICE VISIT (OUTPATIENT)
Dept: ONCOLOGY | Facility: CLINIC | Age: 43
End: 2018-10-24

## 2018-10-24 VITALS
RESPIRATION RATE: 16 BRPM | BODY MASS INDEX: 35.68 KG/M2 | WEIGHT: 209 LBS | HEART RATE: 79 BPM | SYSTOLIC BLOOD PRESSURE: 115 MMHG | TEMPERATURE: 98.8 F | HEIGHT: 64 IN | OXYGEN SATURATION: 99 % | DIASTOLIC BLOOD PRESSURE: 81 MMHG

## 2018-10-24 DIAGNOSIS — C50.912 DUCTAL CARCINOMA OF LEFT BREAST (HCC): Primary | ICD-10-CM

## 2018-10-24 PROCEDURE — 99214 OFFICE O/P EST MOD 30 MIN: CPT | Performed by: INTERNAL MEDICINE

## 2018-10-24 NOTE — PROGRESS NOTES
PROBLEM LIST:  Oncology/Hematology History    1.  Premenopausal stage III infiltrating ductal left breast cancer - original  biopsy 08/22/2013.   a)  Left breast primary with biopsy proven left axillary lymph node involvement  with PET/CT revealing hypermetabolic adenopathy in the left axilla, the  intramammary chain on the left, and the lower left neck/supraclavicular region  with hypermetabolic nodule in the anterior left breast.   b)  ER focally positive and HI negative, HER-2 positive.   c)  Status post neoadjuvant THC chemotherapy with complete pathologic response  by the time of her bilateral mastectomies.    d)  Completing adjuvant Herceptin x1 year.  e)  Status post adjuvant radiotherapy to the left chest wall, the left  supraclavicular area, left inframammary chain and boost to the left axilla.   f)  Tamoxifen begun subsequent to adjuvant radiotherapy and continued to date.   2.  History of Crohn's disease diagnosed at age 13.   a)  Chronic Imuran therapy - discontinued at the time of initiation of  chemotherapy.        Ductal carcinoma of left breast (CMS/HCC)    11/9/2016 Initial Diagnosis     Ductal carcinoma of left breast (CMS/HCC)          REASON FOR VISIT: Breast cancer    HISTORY OF PRESENT ILLNESS:   43 y.o.  female presents today for follow-up of her HER-2 positive breast cancer.  She is on tamoxifen due to weakly positive estrogen receptor.  Her main issue has been her Crohn's disease.  Seems to have flared up somewhat in the last several months.  Otherwise doing well.    Past medical history, social history and family history was reviewed and unchanged from prior visit.    Review of Systems:    Review of Systems - Oncology   A comprehensive 14 point review of systems was performed and was negative except as mentioned.      Medications:  The current medication list was reviewed in the EMR    ALLERGIES:    Allergies   Allergen Reactions   • Lialda [Mesalamine] GI Intolerance     Worse symptoms of  "crohns   • Epinephrine    • Prilosec [Omeprazole]          Physical Exam    VITAL SIGNS:  /81 Comment: RUE  Pulse 79   Temp 98.8 °F (37.1 °C) (Oral)   Resp 16   Ht 162.6 cm (64\")   Wt 94.8 kg (209 lb)   SpO2 99% Comment: RA  BMI 35.87 kg/m²     Wt Readings from Last 3 Encounters:   10/24/18 94.8 kg (209 lb)   09/18/18 98 kg (216 lb)   07/26/18 95.3 kg (210 lb)        Performance Status: 0    General: well appearing, in no acute distress  HEENT: sclera anicteric, oropharynx clear, neck is supple  Lymphatics: no cervical, supraclavicular, or axillary adenopathy  Cardiovascular: regular rate and rhythm, no murmurs, rubs or gallops  Lungs: clear to auscultation bilaterally  Abdomen: soft, nontender, nondistended.  No palpable organomegaly  Extremities: no lower extremity edema  Skin: no rashes, lesions, bruising, or petechiae  Msk:  Shows no weakness of the large muscle groups  Psych: Mood is stable        RECENT LABS:    Lab Results   Component Value Date    HGB 13.4 05/28/2018    HCT 40.3 05/28/2018    MCV 92.4 05/28/2018     (H) 05/28/2018    WBC 12.75 (H) 05/28/2018    NEUTROABS 9.63 (H) 05/28/2018    LYMPHSABS 1.99 05/28/2018    MONOSABS 1.10 (H) 05/28/2018    EOSABS 0.01 05/28/2018    BASOSABS 0.02 05/28/2018       Lab Results   Component Value Date    GLUCOSE 108 (H) 07/17/2018    BUN 26 (H) 07/17/2018    CREATININE 0.81 07/17/2018     07/17/2018    K 4.5 07/17/2018     07/17/2018    CO2 30.0 07/17/2018    CALCIUM 9.1 07/17/2018    PROTEINTOT 6.4 07/17/2018    ALBUMIN 4.19 07/17/2018    BILITOT 0.4 07/17/2018    ALKPHOS 60 07/17/2018    AST 19 07/17/2018    ALT 41 (H) 07/17/2018            Assessment/Plan    1.  Stage IIIc HER-2 positive ductal carcinoma of the left breast.  She is now 4 years out from her diagnosis and clinically doing well.  Due to weakly positive estrogen status she is on 5 years of tamoxifen.  At this time she'll continue treatment.  I will see her back my " clinic in 6 months to see how she is doing and to discontinue tamoxifen.    2.  Crohn's disease.  In the past she was on Imuran.  This was discontinued at the time of her diagnosis.      Kartik Yuen MD  [facility]ematology and Oncology    Return on: 05/29/19  Return in (Approximately): 6 months    No orders of the defined types were placed in this encounter.      10/24/2018         Please note that portions of this note may have been completed with a voice recognition program. Efforts were made to edit the dictations, but occasionally words are mistranscribed.

## 2018-11-15 ENCOUNTER — APPOINTMENT (OUTPATIENT)
Dept: ONCOLOGY | Facility: HOSPITAL | Age: 43
End: 2018-11-15

## 2018-12-18 ENCOUNTER — OFFICE VISIT (OUTPATIENT)
Dept: GASTROENTEROLOGY | Facility: CLINIC | Age: 43
End: 2018-12-18

## 2018-12-18 VITALS
RESPIRATION RATE: 16 BRPM | SYSTOLIC BLOOD PRESSURE: 120 MMHG | DIASTOLIC BLOOD PRESSURE: 80 MMHG | HEIGHT: 64 IN | WEIGHT: 210 LBS | HEART RATE: 85 BPM | TEMPERATURE: 97.6 F | BODY MASS INDEX: 35.85 KG/M2 | OXYGEN SATURATION: 99 %

## 2018-12-18 DIAGNOSIS — K50.10 CROHN'S DISEASE OF LARGE INTESTINE WITHOUT COMPLICATION (HCC): Primary | ICD-10-CM

## 2018-12-18 DIAGNOSIS — E53.8 VITAMIN B12 DEFICIENCY: ICD-10-CM

## 2018-12-18 PROCEDURE — 99213 OFFICE O/P EST LOW 20 MIN: CPT | Performed by: INTERNAL MEDICINE

## 2018-12-18 NOTE — PROGRESS NOTES
Chief Complaint: Debbie is here today for follow-up of her Crohn's disease.  HPI Debbie is had Crohn's disease since she was 11 or 12 years old.  I've cared for her since then.  She recently had a flare off medication for years.  She was placed on Entyvio.  She's been weaned off her steroids.  She's having no systemic complaints at this time related to her Entyvio.  There is no neurological problems she has no headaches.  She's had no blood in her stool she's having normal bowel movements.  She's been off her steroids since the end of September.  He is to take her B12 on a regular basis.    Past Medical History:   Past Medical History:   Diagnosis Date   • Breast cancer (CMS/HCC)    • Cancer (CMS/HCC)     breast cancer   • Crohn's disease (CMS/HCC)        Family History:  Family History   Problem Relation Age of Onset   • Hypertension Other    • Heart disease Other    • Hyperlipidemia Other    • Ovarian cancer Neg Hx    • Breast cancer Neg Hx        Social History:   reports that she quit smoking about 8 years ago. Her smoking use included cigarettes. she has never used smokeless tobacco. She reports that she drinks about 4.2 oz of alcohol per week. She reports that she does not use drugs.    Medications:     Current Outpatient Medications:   •  Cyanocobalamin (B-12 COMPLIANCE INJECTION) 1000 MCG/ML kit, Inject 1,000 mcg as directed Every 30 (Thirty) Days., Disp: 1 kit, Rfl: 12  •  loratadine (CLARITIN) 10 MG tablet, Take 10 mg by mouth Daily., Disp: , Rfl:   •  Needles & Syringes (TB SYRINGE 1 ML) misc, Use with B-12 injection, Disp: 24 each, Rfl: 0  •  tamoxifen (NOLVADEX) 20 MG chemo tablet, TAKE 1 TABLET BY MOUTH  DAILY, Disp: 30 tablet, Rfl: 11  •  Vedolizumab (ENTYVIO IV), Infuse  into a venous catheter., Disp: , Rfl:     Allergies:  Lialda [mesalamine]; Epinephrine; and Prilosec [omeprazole]    Review of Systems   Constitutional: Negative.    HENT: Negative.    Eyes: Negative.    Respiratory: Negative.     Cardiovascular: Negative.    Gastrointestinal: Negative.    Endocrine: Negative.    Genitourinary: Negative.    Musculoskeletal: Negative.    Skin: Negative.    Allergic/Immunologic: Negative.    Neurological: Negative.    Hematological: Negative.    Psychiatric/Behavioral: Negative.    All other systems reviewed and are negative.          Physical Exam:   Constitutional: Pt is oriented to person, place, and time and well-developed, well-nourished, and in no distress.   HENT: Mouth/Throat: Oropharynx is clear and moist.   Neck: Normal range of motion. Neck supple.   Cardiovascular: Normal rate, regular rhythm and normal heart sounds.    Pulmonary/Chest: Effort normal and breath sounds normal. No respiratory distress. No  wheezes.   Abdominal: Soft. Bowel sounds are normal.   Skin: Skin is warm and dry.   Psychiatric: Mood, memory, affect and judgment normal.           Assessment and Plan Debbie's Crohn's disease is stable.  She is due for her next Entyvio injection on January 8.  I'll see her back in 4 months time.  She knows to contact us if she has a flare change in her symptoms.        ICD-10-CM ICD-9-CM   1. Crohn's disease of large intestine without complication (CMS/[facility]) K50.10 555.1   2. Vitamin B12 deficiency E53.8 266.2     Sergey Grant M.D.  [facility]  [phone]  Gastroenterology     EMR Dragon/Transcription disclaimer:   Much of this encounter note is an electronic transcription/translation of spoken language to printed text. The electronic translation of spoken language may permit erroneous, or at times, nonsensical words or phrases to be inadvertently transcribed; Although I have reviewed the note for such errors, some may still exist.

## 2019-04-16 ENCOUNTER — OFFICE VISIT (OUTPATIENT)
Dept: GASTROENTEROLOGY | Facility: CLINIC | Age: 44
End: 2019-04-16

## 2019-04-16 VITALS
TEMPERATURE: 97.7 F | OXYGEN SATURATION: 99 % | BODY MASS INDEX: 35.36 KG/M2 | DIASTOLIC BLOOD PRESSURE: 78 MMHG | WEIGHT: 206 LBS | HEART RATE: 90 BPM | RESPIRATION RATE: 18 BRPM | SYSTOLIC BLOOD PRESSURE: 124 MMHG

## 2019-04-16 DIAGNOSIS — E53.8 VITAMIN B12 DEFICIENCY: ICD-10-CM

## 2019-04-16 DIAGNOSIS — K50.10 CROHN'S DISEASE OF LARGE INTESTINE WITHOUT COMPLICATION (HCC): Primary | ICD-10-CM

## 2019-04-16 PROCEDURE — 99213 OFFICE O/P EST LOW 20 MIN: CPT | Performed by: INTERNAL MEDICINE

## 2019-04-16 NOTE — PROGRESS NOTES
Chief Complaint: Crohn's disease      HPI Debbie is doing quite well on her Entyvio.  She is asymptomatic.  She has had no neurological problems.  She is having normal bowel movements.  She is taking her B12 on a regular basis.  She has been off her steroids since September.  Past Medical History:   Past Medical History:   Diagnosis Date   • Breast cancer (CMS/HCC)    • Cancer (CMS/HCC)     breast cancer   • Crohn's disease (CMS/HCC)        Family History:  Family History   Problem Relation Age of Onset   • Hypertension Other    • Heart disease Other    • Hyperlipidemia Other    • Ovarian cancer Neg Hx    • Breast cancer Neg Hx        Social History:   reports that she quit smoking about 9 years ago. Her smoking use included cigarettes. She has never used smokeless tobacco. She reports that she drinks about 4.2 oz of alcohol per week. She reports that she does not use drugs.    Medications:     Current Outpatient Medications:   •  Cyanocobalamin (B-12 COMPLIANCE INJECTION) 1000 MCG/ML kit, Inject 1,000 mcg as directed Every 30 (Thirty) Days., Disp: 1 kit, Rfl: 12  •  loratadine (CLARITIN) 10 MG tablet, Take 10 mg by mouth Daily., Disp: , Rfl:   •  Needles & Syringes (TB SYRINGE 1 ML) misc, Use with B-12 injection, Disp: 24 each, Rfl: 0  •  tamoxifen (NOLVADEX) 20 MG chemo tablet, TAKE 1 TABLET BY MOUTH  DAILY, Disp: 30 tablet, Rfl: 11  •  Vedolizumab (ENTYVIO IV), Infuse  into a venous catheter., Disp: , Rfl:     Allergies:  Lialda [mesalamine]; Epinephrine; and Prilosec [omeprazole]    Review of Systems   Constitutional: Negative.    HENT: Negative.    Eyes: Negative.    Respiratory: Negative.    Cardiovascular: Negative.    Gastrointestinal: Negative.    Endocrine: Negative.    Genitourinary: Negative.    Musculoskeletal: Negative.    Skin: Negative.    Allergic/Immunologic: Negative.    Neurological: Negative.    Hematological: Negative.    Psychiatric/Behavioral: Negative.            Physical Exam:         Neck: Normal range of motion. Neck supple.   Cardiovascular: Normal rate, regular rhythm and normal heart sounds.    Pulmonary/Chest: Effort normal and breath sounds normal. No respiratory distress. No  wheezes.   Abdominal: Soft. Bowel sounds are normal.   Skin: Skin is warm and dry.             Assessment and Plan Debbie remains in remission.  Her disease is well controlled on the Entyvio.  She will contact us if she has any problems.  I will see her back in 6 months time        ICD-10-CM ICD-9-CM   1. Crohn's disease of large intestine without complication (CMS/[facility]) K50.10 555.1   2. Vitamin B12 deficiency E53.8 266.2     Sergey Grant M.D.  [facility]     EMR Dragon/Transcription disclaimer:   Much of this encounter note is an electronic transcription/translation of spoken language to printed text. The electronic translation of spoken language may permit erroneous, or at times, nonsensical words or phrases to be inadvertently transcribed; Although I have reviewed the note for such errors, some may still exist.

## 2019-05-29 ENCOUNTER — OFFICE VISIT (OUTPATIENT)
Dept: ONCOLOGY | Facility: CLINIC | Age: 44
End: 2019-05-29

## 2019-05-29 VITALS
RESPIRATION RATE: 18 BRPM | HEART RATE: 81 BPM | OXYGEN SATURATION: 99 % | DIASTOLIC BLOOD PRESSURE: 82 MMHG | WEIGHT: 211 LBS | HEIGHT: 64 IN | BODY MASS INDEX: 36.02 KG/M2 | SYSTOLIC BLOOD PRESSURE: 135 MMHG | TEMPERATURE: 97.6 F

## 2019-05-29 DIAGNOSIS — C50.912 DUCTAL CARCINOMA OF LEFT BREAST (HCC): Primary | ICD-10-CM

## 2019-05-29 PROCEDURE — 99213 OFFICE O/P EST LOW 20 MIN: CPT | Performed by: INTERNAL MEDICINE

## 2019-05-29 NOTE — PROGRESS NOTES
PROBLEM LIST:  Oncology/Hematology History    1.  Premenopausal stage III infiltrating ductal left breast cancer - original  biopsy 08/22/2013.   a)  Left breast primary with biopsy proven left axillary lymph node involvement  with PET/CT revealing hypermetabolic adenopathy in the left axilla, the  intramammary chain on the left, and the lower left neck/supraclavicular region  with hypermetabolic nodule in the anterior left breast.   b)  ER focally positive and NM negative, HER-2 positive.   c)  Status post neoadjuvant THC chemotherapy with complete pathologic response  by the time of her bilateral mastectomies.    d)  Completing adjuvant Herceptin x1 year.  e)  Status post adjuvant radiotherapy to the left chest wall, the left  supraclavicular area, left inframammary chain and boost to the left axilla.   f)  Tamoxifen begun subsequent to adjuvant radiotherapy and continued to date.   2.  History of Crohn's disease diagnosed at age 13.   a)  Chronic Imuran therapy - discontinued at the time of initiation of  chemotherapy.        Ductal carcinoma of left breast (CMS/HCC)    11/9/2016 Initial Diagnosis     Ductal carcinoma of left breast (CMS/HCC)            REASON FOR VISIT: Breast cancer    HISTORY OF PRESENT ILLNESS:   43 y.o.  female presents today for follow-up of her HER-2 positive breast cancer.  She is on tamoxifen due to weakly positive estrogen receptor.  She has completed 5 years of tamoxifen.  No major issues ongoing for now.     Past medical history, social history and family history was reviewed and unchanged from prior visit.    Review of Systems:    Review of Systems   Constitutional: Negative.    HENT:  Negative.    Eyes: Negative.    Respiratory: Negative.    Cardiovascular: Negative.    Gastrointestinal: Negative.    Endocrine: Negative.    Genitourinary: Negative.     Musculoskeletal: Negative.    Skin: Negative.    Neurological: Negative.    Hematological: Negative.    Psychiatric/Behavioral:  "Negative.       A comprehensive 14 point review of systems was performed and was negative except as mentioned.      Medications:  The current medication list was reviewed in the EMR    ALLERGIES:    Allergies   Allergen Reactions   • Lialda [Mesalamine] GI Intolerance     Worse symptoms of crohns   • Epinephrine    • Prilosec [Omeprazole]          Physical Exam    VITAL SIGNS:  /82 Comment: RUE  Pulse 81   Temp 97.6 °F (36.4 °C) (Temporal)   Resp 18   Ht 162.6 cm (64\")   Wt 95.7 kg (211 lb)   SpO2 99% Comment: RA  BMI 36.22 kg/m²     Wt Readings from Last 3 Encounters:   05/29/19 95.7 kg (211 lb)   04/16/19 93.4 kg (206 lb)   12/18/18 95.3 kg (210 lb)        Performance Status: 0    General: well appearing, in no acute distress  HEENT: sclera anicteric, oropharynx clear, neck is supple  Lymphatics: no cervical, supraclavicular, or axillary adenopathy  Cardiovascular: regular rate and rhythm, no murmurs, rubs or gallops  Lungs: clear to auscultation bilaterally  Abdomen: soft, nontender, nondistended.  No palpable organomegaly  Extremities: no lower extremity edema  Skin: no rashes, lesions, bruising, or petechiae  Msk:  Shows no weakness of the large muscle groups  Psych: Mood is stable        RECENT LABS:    Lab Results   Component Value Date    HGB 13.4 05/28/2018    HCT 40.3 05/28/2018    MCV 92.4 05/28/2018     (H) 05/28/2018    WBC 12.75 (H) 05/28/2018    NEUTROABS 9.63 (H) 05/28/2018    LYMPHSABS 1.99 05/28/2018    MONOSABS 1.10 (H) 05/28/2018    EOSABS 0.01 05/28/2018    BASOSABS 0.02 05/28/2018       Lab Results   Component Value Date    GLUCOSE 108 (H) 07/17/2018    BUN 26 (H) 07/17/2018    CREATININE 0.81 07/17/2018     07/17/2018    K 4.5 07/17/2018     07/17/2018    CO2 30.0 07/17/2018    CALCIUM 9.1 07/17/2018    PROTEINTOT 6.4 07/17/2018    ALBUMIN 4.19 07/17/2018    BILITOT 0.4 07/17/2018    ALKPHOS 60 07/17/2018    AST 19 07/17/2018    ALT 41 (H) 07/17/2018        "     Assessment/Plan    1.  Stage IIIc HER-2 positive ductal carcinoma of the left breast.  She is now 5 years out from her diagnosis and clinically doing well.  Due to weakly positive estrogen status she is on 5 years of tamoxifen.  At this time she'll discontinue treatment.  She will call me if she is having any symptoms.  Otherwise no further testing necessary.    2.  Crohn's disease.  In the past she was on Imuran.  This was discontinued at the time of her diagnosis.    I spent a total of 15 minutes in direct patient care, greater than 10 minutes (greater than 50%) were spent in coordination of care, and counseling the patient regarding  Breast Cancer . Answered any questions patient had with plan.        Kartik Yuen MD  [facility]         No orders of the defined types were placed in this encounter.      5/29/2019         Please note that portions of this note may have been completed with a voice recognition program. Efforts were made to edit the dictations, but occasionally words are mistranscribed.

## 2019-07-31 ENCOUNTER — TELEPHONE (OUTPATIENT)
Dept: GASTROENTEROLOGY | Facility: CLINIC | Age: 44
End: 2019-07-31

## 2019-07-31 NOTE — TELEPHONE ENCOUNTER
Valerie,  Patient called yesterday afternoon and left message on MA line. She stated that she has a new insurance that she will fax to us. She needs a new Entyvio for home infusion. Thanks

## 2019-08-16 ENCOUNTER — TELEPHONE (OUTPATIENT)
Dept: GASTROENTEROLOGY | Facility: CLINIC | Age: 44
End: 2019-08-16

## 2019-08-16 NOTE — TELEPHONE ENCOUNTER
PT CALLED REGARDING HER INFUSION; INSURANCE HAD NOT RECEIVED ANY PA FOR HER ENTYVIO. ADVISED PT I WOULD CONTACT [facility]; SPOKE TO AD AT [facility], THEY INITIATED THE REFILL AND WILL CONTACT THE INSURANCE AND F/U WITH PATIENT. RETURNED PT CALL, ADVISED PT MICHELLE WILL INITIATE THE REFILL AND CONTACT HER INSURANCE. THEY WILL F/U PHONE TO PATIENT. PT V/U

## 2019-08-19 ENCOUNTER — TELEPHONE (OUTPATIENT)
Dept: GASTROENTEROLOGY | Facility: CLINIC | Age: 44
End: 2019-08-19

## 2019-08-19 NOTE — TELEPHONE ENCOUNTER
PATIENT LVM ABOUT ENTYVIO HOME INFUSION, I CALLED THE PATIENT BACK SHE WAS VERY UPSET THAT SHE WAS NOT GOING TO BE GETTING HER INFUSION THIS WEEK. I CALLED [facility] TO CHECK THE STATUS OF PATIENTS PA AND THEY STATED THEY HAD NOT RECEIVED ANY CLINICAL INFORMATION AND NO PA HAD BEEN STARTED. THE LADY I SPOKE WITH ON THE PHONE SAID THEY WOULD GATHER BENEFITS WITH HER INSURANCE AND START THE PA WHEN THEY GOT THE CLINICAL INFORMATION. I ASKED HOW LONG THIS WOULD TAKE AND URGED THAT IT WAS VERY URGENT SO THAT PATIENT DID NOT END UP BACK IN THE HOSPITAL. LADTARAH STATED THAT THE PA SHOULD TAKE NO MORE THAN 24 HOURS. I FAXED THE ORDER AND ALL CLINICALS WITH A SUCCESSFUL FAX. PLEASE SEE MEDIA FOR CLINICAL DOCUMENTATION THAT WAS FAXED.

## 2019-08-19 NOTE — TELEPHONE ENCOUNTER
I tried to help patient with Entyvio infusion through [facility] pharmacy for home infusion. I was trying to figure out what was going on with the Entyvio PA or prescription. Patient got very aggravated with me and told me she will figure it out herself and hung up. I told Birgit Andujar stated she will try and get it figured out and see what's going on.

## 2019-08-20 ENCOUNTER — TELEPHONE (OUTPATIENT)
Dept: GASTROENTEROLOGY | Facility: CLINIC | Age: 44
End: 2019-08-20

## 2019-08-20 NOTE — TELEPHONE ENCOUNTER
SPOKE WITH CHELY THIS MORNING. SHE STATES THE ARE WORKING ON THE PA. SHE GAVE ME THE DIRECT EXT TO THE  FOR FUTURE REF.    RAY  EXT. [id number]

## 2019-08-21 DIAGNOSIS — E53.8 VITAMIN B 12 DEFICIENCY: ICD-10-CM

## 2019-08-21 RX ORDER — SYRINGE-NEEDLE,INSULIN,0.5 ML 28GX1/2"
SYRINGE, EMPTY DISPOSABLE MISCELLANEOUS
Qty: 24 EACH | Refills: 0 | Status: SHIPPED | OUTPATIENT
Start: 2019-08-21 | End: 2020-10-27 | Stop reason: SDUPTHER

## 2019-08-23 ENCOUNTER — TELEPHONE (OUTPATIENT)
Dept: GASTROENTEROLOGY | Facility: CLINIC | Age: 44
End: 2019-08-23

## 2019-08-23 NOTE — TELEPHONE ENCOUNTER
MICHELLE ENG CALLED BACK AND LET ME KNOW THAT MS. LORENZ'S INSURANCE WILL ONLY PAY FOR BUY AND BILL OR PHYSICIAN OFFICE DELIVERY. I HAVE CONTACTED THE INFUSION CENTER TO SEE IF THEY COULD GUIDE ME IN THE RIGHT DIRECTION. I WILL KEEP YOU UPDATED ON STATUS.    THANK YOU   VIJAY

## 2019-08-26 ENCOUNTER — TELEPHONE (OUTPATIENT)
Dept: GASTROENTEROLOGY | Facility: CLINIC | Age: 44
End: 2019-08-26

## 2019-08-26 NOTE — TELEPHONE ENCOUNTER
S/W PATIENT THIS MORNING AND EXPLAINED THAT INSURANCE IS ONLY COVERING BUY AND BILL AND PHYSICIAN DELIVERY. I LET HER KNOW THAT I HAVE A COUPLE CALLS OUT TO SEE WHO CAN HELP ME BETTER UNDERSTAND THESE OPTIONS AS THEY ARE NEW TO ME. I WILL UPDATE THE PATIENT AS SOON AS I HAVE AN ANSWER FOR HER. PATIENT STATES SHE WILL ALSO BE TALKING TO HER HR AT WORK TODAY TO SEE IF WE CAN GET SPECIAL APPROVAL FOR HOME INFUSION.

## 2019-08-29 ENCOUNTER — TELEPHONE (OUTPATIENT)
Dept: GASTROENTEROLOGY | Facility: CLINIC | Age: 44
End: 2019-08-29

## 2019-08-29 NOTE — TELEPHONE ENCOUNTER
S/W INSURANCE THEY ARE STATING THAT PT IS APPROVED FOR HOME INFUSION REF#[id number]. S/W SANTOS AT [facility] SHE STATES THEY DID RECEIVE THE APPROVAL AND PATIENT HAS BEEN CONTACTED AND APPT SET FOR HOME INFUSION. I CALLED AISHA TO TOUCH BASE ABOUT THE APPT. SHE DID NOT ANSWER, I LVM FOR HER TO CALL ME BACK AT THE OFFICE.

## 2019-10-01 ENCOUNTER — OFFICE VISIT (OUTPATIENT)
Dept: GASTROENTEROLOGY | Facility: CLINIC | Age: 44
End: 2019-10-01

## 2019-10-01 VITALS
SYSTOLIC BLOOD PRESSURE: 128 MMHG | TEMPERATURE: 97.8 F | HEART RATE: 84 BPM | DIASTOLIC BLOOD PRESSURE: 82 MMHG | BODY MASS INDEX: 36.26 KG/M2 | WEIGHT: 212.4 LBS | HEIGHT: 64 IN | OXYGEN SATURATION: 98 % | RESPIRATION RATE: 16 BRPM

## 2019-10-01 DIAGNOSIS — E53.8 VITAMIN B12 DEFICIENCY: ICD-10-CM

## 2019-10-01 DIAGNOSIS — Z12.11 SCREENING FOR COLON CANCER: ICD-10-CM

## 2019-10-01 DIAGNOSIS — K50.10 CROHN'S DISEASE OF LARGE INTESTINE WITHOUT COMPLICATION (HCC): Primary | ICD-10-CM

## 2019-10-01 PROCEDURE — 99214 OFFICE O/P EST MOD 30 MIN: CPT | Performed by: INTERNAL MEDICINE

## 2019-10-01 NOTE — PROGRESS NOTES
Chief Complaint: Debbie is here for follow-up of her Crohn's disease.      HPI Debbie is been a patient of mine since she was 12 or 13 years old.  She has Crohn's disease confined to the large bowel.  She has been on Entyvio for little over a year now.  She has had no neurological problems with regards to this.  She is having normal bowel movements.  Her therapy was interrupted for about 8 weeks because of an insurance change over.  Fortunately she had no problems with this.  She has been off her steroids now for about a year.  She continues on B12 supplementation.  Her breast cancer is stable there is no evidence of recurrence or problem with this.  From a GI standpoint she has no complaints.    Past Medical History:   Past Medical History:   Diagnosis Date   • Breast cancer (CMS/HCC)    • Cancer (CMS/HCC)     breast cancer   • Crohn's disease (CMS/HCC)        Family History:  Family History   Problem Relation Age of Onset   • Hypertension Other    • Heart disease Other    • Hyperlipidemia Other    • Ovarian cancer Neg Hx    • Breast cancer Neg Hx        Social History:   reports that she quit smoking about 9 years ago. Her smoking use included cigarettes. She has never used smokeless tobacco. She reports that she drinks about 4.2 oz of alcohol per week. She reports that she does not use drugs.    Medications:     Current Outpatient Medications:   •  Cyanocobalamin (B-12 COMPLIANCE INJECTION) 1000 MCG/ML kit, Inject 1,000 mcg as directed Every 30 (Thirty) Days., Disp: 1 kit, Rfl: 12  •  loratadine (CLARITIN) 10 MG tablet, Take 10 mg by mouth Daily., Disp: , Rfl:   •  Needles & Syringes (TB SYRINGE 1 ML) misc, Use with B-12 injection, Disp: 24 each, Rfl: 0  •  tamoxifen (NOLVADEX) 20 MG chemo tablet, TAKE 1 TABLET BY MOUTH  DAILY, Disp: 30 tablet, Rfl: 11  •  Vedolizumab (ENTYVIO IV), Infuse  into a venous catheter., Disp: , Rfl:     Allergies:  Lialda [mesalamine]; Epinephrine; and Prilosec [omeprazole]    Review  of Systems   All other systems reviewed and are negative.            Physical Exam:   Constitutional: Pt is oriented to person, place, and time and well-developed, well-nourished, and in no distress.   HENT: Mouth/Throat: Oropharynx is clear and moist.   Neck: Normal range of motion. Neck supple.   Cardiovascular: Normal rate, regular rhythm and normal heart sounds.    Pulmonary/Chest: Effort normal and breath sounds normal. No respiratory distress. No  wheezes.   Abdominal: Soft. Bowel sounds are normal.   Skin: Skin is warm and dry.   Psychiatric: Mood, memory, affect and judgment normal.           Assessment and Plan Debbie's Crohn's disease is stable.  She is maintained on her Entyvio.  She is due for screening colonoscopy to exclude any type of dysplasia.  Arrangements were made for her to complete this in December.  He will be continued on her infusions and B12 supplementation.  She has prescriptions for all of these.  I will see her 6 months from her colonoscopy that we will do in December.        ICD-10-CM ICD-9-CM   1. Crohn's disease of large intestine without complication (CMS/[facility]) K50.10 555.1   2. Vitamin B12 deficiency E53.8 266.2   3. Screening for colon cancer Z12.11 V76.51   Sergey Grant M.D.  [facility]       EMR Dragon/Transcription disclaimer:   Much of this encounter note is an electronic transcription/translation of spoken language to printed text. The electronic translation of spoken language may permit erroneous, or at times, nonsensical words or phrases to be inadvertently transcribed; Although I have reviewed the note for such errors, some may still exist.

## 2019-12-04 DIAGNOSIS — Z12.11 SCREENING FOR COLON CANCER: Primary | ICD-10-CM

## 2019-12-10 ENCOUNTER — TELEPHONE (OUTPATIENT)
Dept: ONCOLOGY | Facility: CLINIC | Age: 44
End: 2019-12-10

## 2019-12-10 NOTE — TELEPHONE ENCOUNTER
Patient called and she has been off Tamoxifen since May she is starting to spot and this November and she has not had a period in years. Please call.

## 2019-12-10 NOTE — TELEPHONE ENCOUNTER
Returned call to patient after discussing with Dr. Heath. Informing patient that Dr. Heath is wanting her to follow up with Gynecologist to be assessed. Patient stating she didn't have one and she would discuss with Dr. Heath.

## 2019-12-12 ENCOUNTER — OUTSIDE FACILITY SERVICE (OUTPATIENT)
Dept: GASTROENTEROLOGY | Facility: CLINIC | Age: 44
End: 2019-12-12

## 2019-12-12 ENCOUNTER — LAB REQUISITION (OUTPATIENT)
Dept: LAB | Facility: HOSPITAL | Age: 44
End: 2019-12-12

## 2019-12-12 DIAGNOSIS — D12.4 BENIGN NEOPLASM OF DESCENDING COLON: ICD-10-CM

## 2019-12-12 DIAGNOSIS — K50.10 CROHN'S DISEASE OF LARGE INTESTINE WITHOUT COMPLICATIONS (HCC): ICD-10-CM

## 2019-12-12 DIAGNOSIS — Z12.11 ENCOUNTER FOR SCREENING FOR MALIGNANT NEOPLASM OF COLON: ICD-10-CM

## 2019-12-12 DIAGNOSIS — K52.9 NONINFECTIVE GASTROENTERITIS AND COLITIS, UNSPECIFIED: ICD-10-CM

## 2019-12-12 PROCEDURE — 45380 COLONOSCOPY AND BIOPSY: CPT | Performed by: INTERNAL MEDICINE

## 2019-12-12 PROCEDURE — 45385 COLONOSCOPY W/LESION REMOVAL: CPT | Performed by: INTERNAL MEDICINE

## 2019-12-12 PROCEDURE — 88305 TISSUE EXAM BY PATHOLOGIST: CPT | Performed by: INTERNAL MEDICINE

## 2019-12-13 LAB
CYTO UR: NORMAL
LAB AP CASE REPORT: NORMAL
LAB AP CLINICAL INFORMATION: NORMAL
PATH REPORT.FINAL DX SPEC: NORMAL
PATH REPORT.GROSS SPEC: NORMAL

## 2020-06-23 ENCOUNTER — OFFICE VISIT (OUTPATIENT)
Dept: GASTROENTEROLOGY | Facility: CLINIC | Age: 45
End: 2020-06-23

## 2020-06-23 VITALS
WEIGHT: 215 LBS | TEMPERATURE: 98.4 F | DIASTOLIC BLOOD PRESSURE: 78 MMHG | BODY MASS INDEX: 36.9 KG/M2 | OXYGEN SATURATION: 99 % | HEART RATE: 94 BPM | SYSTOLIC BLOOD PRESSURE: 126 MMHG

## 2020-06-23 DIAGNOSIS — Z79.899 LONG TERM CURRENT USE OF IMMUNOSUPPRESSIVE DRUG: ICD-10-CM

## 2020-06-23 DIAGNOSIS — E53.8 VITAMIN B12 DEFICIENCY: ICD-10-CM

## 2020-06-23 DIAGNOSIS — K50.10 CROHN'S DISEASE OF LARGE INTESTINE WITHOUT COMPLICATION (HCC): Primary | ICD-10-CM

## 2020-06-23 PROCEDURE — 99214 OFFICE O/P EST MOD 30 MIN: CPT | Performed by: INTERNAL MEDICINE

## 2020-06-23 NOTE — PROGRESS NOTES
Chief Complaint: Debbie is here for follow-up of her Crohn's disease.      HPI Debbie has been a patient of mine since she was a child.  She has known Crohn's disease confined to the large intestine.  She has a B12 deficiency but is never had documented small bowel disease.  She was in remission on Imuran for years.  This was discontinued when she was found to have breast cancer.  She remained in remission until about 3 years ago when she had a significant flare.  She required some steroids at that time and was begun on Entyvio.  She is tolerated this well.  She is asymptomatic.  She is having regular bowel movements she has had no blood.  She tolerates her every 2-month infusions well.  Over the last several months she has noticed some intermittent dysphasia.  She describes this occurring with chicken and bread as well as rice.  She occasionally has heartburn but it usually when she eats and drinks late at night.  He has no neurological complaints related to possible problems with her Entyvio.    Past Medical History:   Past Medical History:   Diagnosis Date   • Breast cancer (CMS/HCC)    • Cancer (CMS/HCC)     breast cancer   • Crohn's disease (CMS/HCC)        Family History:  Family History   Problem Relation Age of Onset   • Hypertension Other    • Heart disease Other    • Hyperlipidemia Other    • Ovarian cancer Neg Hx    • Breast cancer Neg Hx        Social History:   reports that she quit smoking about 10 years ago. Her smoking use included cigarettes. She has never used smokeless tobacco. She reports that she drinks about 7.0 standard drinks of alcohol per week. She reports that she does not use drugs.    Medications:     Current Outpatient Medications:   •  Cyanocobalamin (B-12 COMPLIANCE INJECTION) 1000 MCG/ML kit, Inject 1,000 mcg as directed Every 30 (Thirty) Days., Disp: 1 kit, Rfl: 12  •  loratadine (CLARITIN) 10 MG tablet, Take 10 mg by mouth Daily., Disp: , Rfl:   •  Needles & Syringes (TB SYRINGE  1 ML) misc, Use with B-12 injection, Disp: 24 each, Rfl: 0  •  Sod Picosulfate-Mag Ox-Cit Acd 10-3.5-12 MG-GM -GM/160ML solution, Take 1 kit by mouth Take As Directed. Follow instructions that were mailed to your home. If you didn't receive these call [phone]., Disp: 2 bottle, Rfl: 0  •  tamoxifen (NOLVADEX) 20 MG chemo tablet, TAKE 1 TABLET BY MOUTH  DAILY, Disp: 30 tablet, Rfl: 11  •  Vedolizumab (ENTYVIO IV), Infuse  into a venous catheter., Disp: , Rfl:     Allergies:  Lialda [mesalamine]; Epinephrine; and Prilosec [omeprazole]    Review of Systems   Constitutional: Negative.    HENT: Negative.    Eyes: Negative.    Respiratory: Negative.    Cardiovascular: Negative.    Gastrointestinal: Negative.    Endocrine: Negative.    Genitourinary: Negative.    Musculoskeletal: Negative.    Skin: Negative.    Allergic/Immunologic: Negative.    Neurological: Negative.    Hematological: Negative.    Psychiatric/Behavioral: Negative.            Physical Exam:   Constitutional: Pt is oriented to person, place, and time and well-developed, well-nourished, and in no distress.   HENT: Mouth/Throat: Oropharynx is clear and moist.   Neck: Normal range of motion. Neck supple.   Cardiovascular: Normal rate, regular rhythm and normal heart sounds.    Pulmonary/Chest: Effort normal and breath sounds normal. No respiratory distress. No  wheezes.   Abdominal: Soft. Bowel sounds are normal.   Skin: Skin is warm and dry.   Psychiatric: Mood, memory, affect and judgment normal.           Assessment and Plan problem #1 of her Crohn's disease.  She is asymptomatic.  She will be continued on her Entyvio indefinitely for the time being.  She also will continue her B12 therapy.  We spent some time discussing the dysphasia that she is developed.  She may indeed have a Schatzki's ring but it is very infrequent.  She knows to contact us if this intensifies or she develops heartburn on a regular basis.  Her breast cancer remains in remission  she is followed here for this.  At least 25 minutes was spent during this annual exam.        ICD-10-CM ICD-9-CM   1. Crohn's disease of large intestine without complication (CMS/HCC) K50.10 555.1   2. Long term current use of immunosuppressive drug Z79.899 V58.69   3. Vitamin B12 deficiency E53.8 266.2   Sergey Grant M.D.  [facility]       EMR Dragon/Transcription disclaimer:   Much of this encounter note is an electronic transcription/translation of spoken language to printed text. The electronic translation of spoken language may permit erroneous, or at times, nonsensical words or phrases to be inadvertently transcribed; Although I have reviewed the note for such errors, some may still exist.   Answers for HPI/ROS submitted by the patient on 6/21/2020   What is the primary reason for your visit?: Other  Please describe your symptoms.: Routine follow up visit  Have you had these symptoms before?: Yes  How long have you been having these symptoms?: Greater than 2 weeks  Please list any medications you are currently taking for this condition.: Jinny

## 2020-07-14 ENCOUNTER — TELEPHONE (OUTPATIENT)
Dept: GASTROENTEROLOGY | Facility: CLINIC | Age: 45
End: 2020-07-14

## 2020-07-14 NOTE — TELEPHONE ENCOUNTER
FAXED LOV RECORDS TO hospitals PHARMACY PER THEIR REQUEST FOR INFUSION AUTHORIZATION.   SCANNED IN MEDIA 7/14/20-

## 2020-07-21 NOTE — TELEPHONE ENCOUNTER
Birgit,  Patient called and wanted to know if you all received her insurance card. Also she stated if you needed to speak with [facility] pharmacy rep Maria E here is her contact information. [phone] concerning Entyvio infusion PA.  Ms Clemens stated that her old insurance approved her Entvyio infusion until 7/24/2020; which she will be receiving on Friday. Thanks

## 2020-07-22 ENCOUNTER — TELEPHONE (OUTPATIENT)
Dept: GASTROENTEROLOGY | Facility: CLINIC | Age: 45
End: 2020-07-22

## 2020-07-22 NOTE — TELEPHONE ENCOUNTER
BESSY LIMON FROM [facility] REQUESTING CLINIC NOTES AND PRESCRIPTION  [phone].  ENTYVIO INFUSION APPROVED THIS MORNING BY VIJAY MCCRAY

## 2020-07-22 NOTE — TELEPHONE ENCOUNTER
I SPOKE WITH HER YESTERDAY, NEW RX AND ALL OFFICE NOTES HAVE BEEN FAXED, I CALLED THIS MORNING AND ENTYVIO WAS APPROVED.

## 2020-07-22 NOTE — TELEPHONE ENCOUNTER
SPOKE WITH [facility] ON 07/21/2020, THEY STATE THEY WERE SLOW LOADING NEW MEMBERS TO THE SYSTEM AND WOULD NEED TO HAVE A NEW AUTH DONE FOR MRS.GERDA    I FAXED ALL DOCUMENTATION TO [facility] @ [phone] ON 07/21/2020 AND CALLED [facility] @ [phone] ON 07/22/2020 AND SPOKE WITH ALTAGRACIA MORRIS SHE WAS ABLE TO GET PATIENTS ENTYVIO APPROVED OVER THE PHONE.    AUTH# [id number]    CALLED TO LET AISHA KNOW THAT MEDICATION WAS APPROVED TO IN HOME INFUSION. PATIENT GAVE VERBAL UNDERSTANDING.    PATIENT INSTRUCTED TO USE OPT 5 WHEN CALLING THE OFFICE SO THAT SHE COULD REACH ME DIRECTLY.

## 2020-07-22 NOTE — TELEPHONE ENCOUNTER
Gifty Genao from [facility] called back.   Entyvio 300mg IV Infusion every 8 weeks.  REF# [id number]  START DATE 7/24/2020- END DATE 7/24/2021

## 2020-10-27 ENCOUNTER — TELEPHONE (OUTPATIENT)
Dept: GASTROENTEROLOGY | Facility: CLINIC | Age: 45
End: 2020-10-27

## 2020-10-27 DIAGNOSIS — E53.8 VITAMIN B 12 DEFICIENCY: ICD-10-CM

## 2020-10-27 RX ORDER — SYRINGE-NEEDLE,INSULIN,0.5 ML 28GX1/2"
SYRINGE, EMPTY DISPOSABLE MISCELLANEOUS
Qty: 24 EACH | Refills: 0 | Status: SHIPPED | OUTPATIENT
Start: 2020-10-27 | End: 2021-05-03

## 2020-10-27 RX ORDER — CYANOCOBALAMIN, ISOPROPYL ALCOHOL 1000MCG/ML
1000 KIT INJECTION
Qty: 1 KIT | Refills: 12 | Status: SHIPPED | OUTPATIENT
Start: 2020-10-27 | End: 2021-11-08

## 2020-10-27 NOTE — TELEPHONE ENCOUNTER
DR. ARANDA,  IM NOT SURE WHAT SHE IS REFERRING TO ABOUT THE VOICEMAIL, HOWEVER IT HAS BEEN A YEAR SINCE LAST REFILL FOR B12. CAN YOU PLEASE SIGN OFF ON REFILL FOR B12 AND TB SYRINGE.    THANK YOU  VIJAY

## 2020-10-27 NOTE — TELEPHONE ENCOUNTER
"PT CALLED LEFT A PARTIAL VM IN REGARDS TO B12  VIA VM , PT SAYS \"I was calling about my B12 but according to your office voicemail, I need to contact me pharmacy. So never mind because I wont be helped anyway\"  Pt then hung up the phone.   "

## 2021-01-26 ENCOUNTER — TELEPHONE (OUTPATIENT)
Dept: GASTROENTEROLOGY | Facility: CLINIC | Age: 46
End: 2021-01-26

## 2021-01-26 NOTE — TELEPHONE ENCOUNTER
I spoke with Ms Clemens. Last Entyvio infusion was on 1/7/2021 and next is 3/4/2021. When is the best time for her to receive Covid vaccine? I spoke with Dr Grant. Dr Grant stated it really doesn't matter when she gets it but she need to get it. Ms Clemens informed of Dr Grant's recommendation.

## 2021-03-15 ENCOUNTER — APPOINTMENT (OUTPATIENT)
Dept: PREADMISSION TESTING | Facility: HOSPITAL | Age: 46
End: 2021-03-15

## 2021-03-15 VITALS — WEIGHT: 216 LBS | HEIGHT: 64 IN | BODY MASS INDEX: 36.88 KG/M2

## 2021-03-15 LAB
ABO GROUP BLD: NORMAL
ANION GAP SERPL CALCULATED.3IONS-SCNC: 5 MMOL/L (ref 5–15)
BLD GP AB SCN SERPL QL: NEGATIVE
BUN SERPL-MCNC: 9 MG/DL (ref 6–20)
BUN/CREAT SERPL: 15.3 (ref 7–25)
CALCIUM SPEC-SCNC: 9.2 MG/DL (ref 8.6–10.5)
CHLORIDE SERPL-SCNC: 103 MMOL/L (ref 98–107)
CO2 SERPL-SCNC: 31 MMOL/L (ref 22–29)
CREAT SERPL-MCNC: 0.59 MG/DL (ref 0.57–1)
DEPRECATED RDW RBC AUTO: 49 FL (ref 37–54)
ERYTHROCYTE [DISTWIDTH] IN BLOOD BY AUTOMATED COUNT: 13.7 % (ref 12.3–15.4)
GFR SERPL CREATININE-BSD FRML MDRD: 110 ML/MIN/1.73
GLUCOSE SERPL-MCNC: 111 MG/DL (ref 65–99)
HCT VFR BLD AUTO: 31.3 % (ref 34–46.6)
HGB BLD-MCNC: 9.8 G/DL (ref 12–15.9)
MCH RBC QN AUTO: 30.2 PG (ref 26.6–33)
MCHC RBC AUTO-ENTMCNC: 31.3 G/DL (ref 31.5–35.7)
MCV RBC AUTO: 96.6 FL (ref 79–97)
PLATELET # BLD AUTO: 419 10*3/MM3 (ref 140–450)
PMV BLD AUTO: 9.6 FL (ref 6–12)
POTASSIUM SERPL-SCNC: 3.8 MMOL/L (ref 3.5–5.2)
RBC # BLD AUTO: 3.24 10*6/MM3 (ref 3.77–5.28)
RH BLD: POSITIVE
SARS-COV-2 RNA RESP QL NAA+PROBE: NOT DETECTED
SODIUM SERPL-SCNC: 139 MMOL/L (ref 136–145)
T&S EXPIRATION DATE: NORMAL
WBC # BLD AUTO: 10.92 10*3/MM3 (ref 3.4–10.8)

## 2021-03-15 PROCEDURE — 36415 COLL VENOUS BLD VENIPUNCTURE: CPT

## 2021-03-15 PROCEDURE — 85027 COMPLETE CBC AUTOMATED: CPT

## 2021-03-15 PROCEDURE — 86923 COMPATIBILITY TEST ELECTRIC: CPT

## 2021-03-15 PROCEDURE — C9803 HOPD COVID-19 SPEC COLLECT: HCPCS

## 2021-03-15 PROCEDURE — 86900 BLOOD TYPING SEROLOGIC ABO: CPT

## 2021-03-15 PROCEDURE — 86850 RBC ANTIBODY SCREEN: CPT

## 2021-03-15 PROCEDURE — 86901 BLOOD TYPING SEROLOGIC RH(D): CPT

## 2021-03-15 PROCEDURE — U0003 INFECTIOUS AGENT DETECTION BY NUCLEIC ACID (DNA OR RNA); SEVERE ACUTE RESPIRATORY SYNDROME CORONAVIRUS 2 (SARS-COV-2) (CORONAVIRUS DISEASE [COVID-19]), AMPLIFIED PROBE TECHNIQUE, MAKING USE OF HIGH THROUGHPUT TECHNOLOGIES AS DESCRIBED BY CMS-2020-01-R: HCPCS

## 2021-03-15 PROCEDURE — 80048 BASIC METABOLIC PNL TOTAL CA: CPT

## 2021-03-15 RX ORDER — MULTIPLE VITAMINS W/ MINERALS TAB 9MG-400MCG
1 TAB ORAL DAILY
COMMUNITY

## 2021-03-15 NOTE — PAT
Patient to apply Chlorhexadine wipes  to surgical area (as instructed) the night before procedure and the AM of procedure. Wipes provided.    Covid test done in PAT.

## 2021-03-16 ENCOUNTER — ANESTHESIA EVENT (OUTPATIENT)
Dept: PERIOP | Facility: HOSPITAL | Age: 46
End: 2021-03-16

## 2021-03-16 RX ORDER — FAMOTIDINE 10 MG/ML
20 INJECTION, SOLUTION INTRAVENOUS ONCE
Status: CANCELLED | OUTPATIENT
Start: 2021-03-16 | End: 2021-03-16

## 2021-03-17 ENCOUNTER — HOSPITAL ENCOUNTER (OUTPATIENT)
Facility: HOSPITAL | Age: 46
Setting detail: HOSPITAL OUTPATIENT SURGERY
Discharge: HOME | End: 2021-03-17
Attending: OBSTETRICS & GYNECOLOGY | Admitting: OBSTETRICS & GYNECOLOGY

## 2021-03-17 ENCOUNTER — ANESTHESIA (OUTPATIENT)
Dept: PERIOP | Facility: HOSPITAL | Age: 46
End: 2021-03-17

## 2021-03-17 VITALS
DIASTOLIC BLOOD PRESSURE: 85 MMHG | HEART RATE: 82 BPM | HEIGHT: 64 IN | TEMPERATURE: 98 F | SYSTOLIC BLOOD PRESSURE: 114 MMHG | RESPIRATION RATE: 16 BRPM | WEIGHT: 216 LBS | OXYGEN SATURATION: 98 % | BODY MASS INDEX: 36.88 KG/M2

## 2021-03-17 DIAGNOSIS — N93.9 ABNORMAL UTERINE BLEEDING: ICD-10-CM

## 2021-03-17 LAB
ABO GROUP BLD: NORMAL
B-HCG UR QL: NEGATIVE
INTERNAL NEGATIVE CONTROL: NEGATIVE
INTERNAL POSITIVE CONTROL: POSITIVE
Lab: NORMAL
RH BLD: POSITIVE

## 2021-03-17 PROCEDURE — 81025 URINE PREGNANCY TEST: CPT | Performed by: ANESTHESIOLOGY

## 2021-03-17 PROCEDURE — 25010000002 KETOROLAC TROMETHAMINE PER 15 MG: Performed by: NURSE ANESTHETIST, CERTIFIED REGISTERED

## 2021-03-17 PROCEDURE — 25010000002 FENTANYL CITRATE (PF) 100 MCG/2ML SOLUTION: Performed by: NURSE ANESTHETIST, CERTIFIED REGISTERED

## 2021-03-17 PROCEDURE — 25010000002 ONDANSETRON PER 1 MG: Performed by: NURSE ANESTHETIST, CERTIFIED REGISTERED

## 2021-03-17 PROCEDURE — 25010000002 DEXAMETHASONE PER 1 MG: Performed by: NURSE ANESTHETIST, CERTIFIED REGISTERED

## 2021-03-17 PROCEDURE — 86901 BLOOD TYPING SEROLOGIC RH(D): CPT

## 2021-03-17 PROCEDURE — 88305 TISSUE EXAM BY PATHOLOGIST: CPT | Performed by: OBSTETRICS & GYNECOLOGY

## 2021-03-17 PROCEDURE — 25010000003 CEFAZOLIN IN DEXTROSE 2-4 GM/100ML-% SOLUTION: Performed by: OBSTETRICS & GYNECOLOGY

## 2021-03-17 PROCEDURE — 86900 BLOOD TYPING SEROLOGIC ABO: CPT

## 2021-03-17 PROCEDURE — 25010000002 PROPOFOL 10 MG/ML EMULSION: Performed by: NURSE ANESTHETIST, CERTIFIED REGISTERED

## 2021-03-17 RX ORDER — BUPIVACAINE HCL/0.9 % NACL/PF 0.125 %
PLASTIC BAG, INJECTION (ML) EPIDURAL AS NEEDED
Status: DISCONTINUED | OUTPATIENT
Start: 2021-03-17 | End: 2021-03-17 | Stop reason: SURG

## 2021-03-17 RX ORDER — KETOROLAC TROMETHAMINE 30 MG/ML
INJECTION, SOLUTION INTRAMUSCULAR; INTRAVENOUS AS NEEDED
Status: DISCONTINUED | OUTPATIENT
Start: 2021-03-17 | End: 2021-03-17 | Stop reason: SURG

## 2021-03-17 RX ORDER — SODIUM CHLORIDE, SODIUM LACTATE, POTASSIUM CHLORIDE, CALCIUM CHLORIDE 600; 310; 30; 20 MG/100ML; MG/100ML; MG/100ML; MG/100ML
9 INJECTION, SOLUTION INTRAVENOUS CONTINUOUS
Status: DISCONTINUED | OUTPATIENT
Start: 2021-03-17 | End: 2021-03-17 | Stop reason: HOSPADM

## 2021-03-17 RX ORDER — DEXAMETHASONE SODIUM PHOSPHATE 4 MG/ML
INJECTION, SOLUTION INTRA-ARTICULAR; INTRALESIONAL; INTRAMUSCULAR; INTRAVENOUS; SOFT TISSUE AS NEEDED
Status: DISCONTINUED | OUTPATIENT
Start: 2021-03-17 | End: 2021-03-17 | Stop reason: SURG

## 2021-03-17 RX ORDER — HEPARIN SODIUM 5000 [USP'U]/ML
5000 INJECTION, SOLUTION INTRAVENOUS; SUBCUTANEOUS ONCE
Status: DISCONTINUED | OUTPATIENT
Start: 2021-03-17 | End: 2021-03-17 | Stop reason: HOSPADM

## 2021-03-17 RX ORDER — LIDOCAINE HYDROCHLORIDE 10 MG/ML
INJECTION, SOLUTION EPIDURAL; INFILTRATION; INTRACAUDAL; PERINEURAL AS NEEDED
Status: DISCONTINUED | OUTPATIENT
Start: 2021-03-17 | End: 2021-03-17 | Stop reason: SURG

## 2021-03-17 RX ORDER — FAMOTIDINE 20 MG/1
20 TABLET, FILM COATED ORAL ONCE
Status: COMPLETED | OUTPATIENT
Start: 2021-03-17 | End: 2021-03-17

## 2021-03-17 RX ORDER — ONDANSETRON 2 MG/ML
INJECTION INTRAMUSCULAR; INTRAVENOUS AS NEEDED
Status: DISCONTINUED | OUTPATIENT
Start: 2021-03-17 | End: 2021-03-17 | Stop reason: SURG

## 2021-03-17 RX ORDER — SODIUM CHLORIDE 0.9 % (FLUSH) 0.9 %
10 SYRINGE (ML) INJECTION EVERY 12 HOURS SCHEDULED
Status: DISCONTINUED | OUTPATIENT
Start: 2021-03-17 | End: 2021-03-17 | Stop reason: HOSPADM

## 2021-03-17 RX ORDER — MIDAZOLAM HYDROCHLORIDE 1 MG/ML
2 INJECTION INTRAMUSCULAR; INTRAVENOUS
Status: DISCONTINUED | OUTPATIENT
Start: 2021-03-17 | End: 2021-03-17 | Stop reason: HOSPADM

## 2021-03-17 RX ORDER — HYDROMORPHONE HYDROCHLORIDE 1 MG/ML
0.5 INJECTION, SOLUTION INTRAMUSCULAR; INTRAVENOUS; SUBCUTANEOUS
Status: DISCONTINUED | OUTPATIENT
Start: 2021-03-17 | End: 2021-03-17 | Stop reason: HOSPADM

## 2021-03-17 RX ORDER — SODIUM CHLORIDE 9 MG/ML
INJECTION, SOLUTION INTRAVENOUS AS NEEDED
Status: DISCONTINUED | OUTPATIENT
Start: 2021-03-17 | End: 2021-03-17 | Stop reason: HOSPADM

## 2021-03-17 RX ORDER — MAGNESIUM HYDROXIDE 1200 MG/15ML
LIQUID ORAL AS NEEDED
Status: DISCONTINUED | OUTPATIENT
Start: 2021-03-17 | End: 2021-03-17 | Stop reason: HOSPADM

## 2021-03-17 RX ORDER — SODIUM CHLORIDE 0.9 % (FLUSH) 0.9 %
10 SYRINGE (ML) INJECTION AS NEEDED
Status: DISCONTINUED | OUTPATIENT
Start: 2021-03-17 | End: 2021-03-17 | Stop reason: HOSPADM

## 2021-03-17 RX ORDER — ACETAMINOPHEN 500 MG
1000 TABLET ORAL ONCE
Status: COMPLETED | OUTPATIENT
Start: 2021-03-17 | End: 2021-03-17

## 2021-03-17 RX ORDER — FENTANYL CITRATE 50 UG/ML
50 INJECTION, SOLUTION INTRAMUSCULAR; INTRAVENOUS
Status: DISCONTINUED | OUTPATIENT
Start: 2021-03-17 | End: 2021-03-17 | Stop reason: HOSPADM

## 2021-03-17 RX ORDER — FENTANYL CITRATE 50 UG/ML
INJECTION, SOLUTION INTRAMUSCULAR; INTRAVENOUS AS NEEDED
Status: DISCONTINUED | OUTPATIENT
Start: 2021-03-17 | End: 2021-03-17 | Stop reason: SURG

## 2021-03-17 RX ORDER — LIDOCAINE HYDROCHLORIDE 10 MG/ML
0.5 INJECTION, SOLUTION EPIDURAL; INFILTRATION; INTRACAUDAL; PERINEURAL ONCE AS NEEDED
Status: COMPLETED | OUTPATIENT
Start: 2021-03-17 | End: 2021-03-17

## 2021-03-17 RX ORDER — MIDAZOLAM HYDROCHLORIDE 1 MG/ML
1 INJECTION INTRAMUSCULAR; INTRAVENOUS
Status: DISCONTINUED | OUTPATIENT
Start: 2021-03-17 | End: 2021-03-17 | Stop reason: HOSPADM

## 2021-03-17 RX ORDER — SCOLOPAMINE TRANSDERMAL SYSTEM 1 MG/1
1 PATCH, EXTENDED RELEASE TRANSDERMAL ONCE
Status: DISCONTINUED | OUTPATIENT
Start: 2021-03-17 | End: 2021-03-17 | Stop reason: HOSPADM

## 2021-03-17 RX ORDER — PROPOFOL 10 MG/ML
VIAL (ML) INTRAVENOUS AS NEEDED
Status: DISCONTINUED | OUTPATIENT
Start: 2021-03-17 | End: 2021-03-17 | Stop reason: SURG

## 2021-03-17 RX ORDER — CEFAZOLIN SODIUM 2 G/100ML
2 INJECTION, SOLUTION INTRAVENOUS ONCE
Status: COMPLETED | OUTPATIENT
Start: 2021-03-17 | End: 2021-03-17

## 2021-03-17 RX ADMIN — CEFAZOLIN SODIUM 2 G: 2 INJECTION, SOLUTION INTRAVENOUS at 10:03

## 2021-03-17 RX ADMIN — ONDANSETRON 4 MG: 2 INJECTION INTRAMUSCULAR; INTRAVENOUS at 10:11

## 2021-03-17 RX ADMIN — DEXAMETHASONE SODIUM PHOSPHATE 8 MG: 4 INJECTION, SOLUTION INTRA-ARTICULAR; INTRALESIONAL; INTRAMUSCULAR; INTRAVENOUS; SOFT TISSUE at 10:11

## 2021-03-17 RX ADMIN — LIDOCAINE HYDROCHLORIDE 50 MG: 10 INJECTION, SOLUTION EPIDURAL; INFILTRATION; INTRACAUDAL; PERINEURAL at 10:05

## 2021-03-17 RX ADMIN — KETOROLAC TROMETHAMINE 30 MG: 30 INJECTION, SOLUTION INTRAMUSCULAR; INTRAVENOUS at 10:25

## 2021-03-17 RX ADMIN — FENTANYL CITRATE 50 MCG: 50 INJECTION, SOLUTION INTRAMUSCULAR; INTRAVENOUS at 10:05

## 2021-03-17 RX ADMIN — FAMOTIDINE 20 MG: 20 TABLET ORAL at 08:16

## 2021-03-17 RX ADMIN — SODIUM CHLORIDE, POTASSIUM CHLORIDE, SODIUM LACTATE AND CALCIUM CHLORIDE 9 ML/HR: 600; 310; 30; 20 INJECTION, SOLUTION INTRAVENOUS at 08:18

## 2021-03-17 RX ADMIN — FENTANYL CITRATE 50 MCG: 50 INJECTION, SOLUTION INTRAMUSCULAR; INTRAVENOUS at 10:19

## 2021-03-17 RX ADMIN — Medication 80 MCG: at 10:13

## 2021-03-17 RX ADMIN — ACETAMINOPHEN 1000 MG: 500 TABLET ORAL at 08:16

## 2021-03-17 RX ADMIN — SCOPALAMINE 1 PATCH: 1 PATCH, EXTENDED RELEASE TRANSDERMAL at 08:16

## 2021-03-17 RX ADMIN — LIDOCAINE HYDROCHLORIDE 0.5 ML: 10 INJECTION, SOLUTION EPIDURAL; INFILTRATION; INTRACAUDAL; PERINEURAL at 08:18

## 2021-03-17 RX ADMIN — PROPOFOL 200 MG: 10 INJECTION, EMULSION INTRAVENOUS at 10:05

## 2021-03-17 NOTE — H&P
Patient Care Team:  Amy Mcconnell MD as PCP - General (Family Medicine)    Chief complaint heavy menses aub    Subjective     Patient is a 45 y.o. female presents with heavy menses. Aub.     Review of Systems   Pertinent items are noted in HPI    History  Past Medical History:   Diagnosis Date   • Breast cancer (CMS/HCC)    • Cancer (CMS/HCC)     breast cancer   • Crohn's disease (CMS/HCC)    • Slow to wake up after anesthesia      Past Surgical History:   Procedure Laterality Date   •  SECTION     • COLONOSCOPY  2019   • MASTECTOMY Bilateral    • ROOT CANAL  2017    Dr Teague      Family History   Problem Relation Age of Onset   • Hypertension Other    • Heart disease Other    • Hyperlipidemia Other    • Ovarian cancer Neg Hx    • Breast cancer Neg Hx      Social History     Tobacco Use   • Smoking status: Former Smoker     Packs/day: 0.75     Years: 15.00     Pack years: 11.25     Types: Cigarettes     Quit date:      Years since quittin.2   • Smokeless tobacco: Never Used   Substance Use Topics   • Alcohol use: Yes     Alcohol/week: 7.0 standard drinks     Types: 7 Glasses of wine per week   • Drug use: No     E-cigarette/Vaping     E-cigarette/Vaping Substances     Medications Prior to Admission   Medication Sig Dispense Refill Last Dose   • loratadine (CLARITIN) 10 MG tablet Take 10 mg by mouth Daily.   3/16/2021 at Unknown time   • multivitamin with minerals (MULTIVITAMIN ADULT PO) Take 1 tablet by mouth Daily.   Past Week at Unknown time   • Cyanocobalamin (B-12 Compliance Injection) 1000 MCG/ML kit Inject 1,000 mcg as directed Every 30 (Thirty) Days. 1 kit 12 3/14/2021   • Needles & Syringes (TB Syringe 1 ML) misc Use with B-12 injection 24 each 0    • Vedolizumab (ENTYVIO IV) Infuse  into a venous catheter. Every 8 weeks   3/3/2021     Allergies:    Allergies   Allergen Reactions   • Lialda [Mesalamine] GI Intolerance     Worse symptoms of crohns   • Nuts Swelling      TREE NUTS, makes tongue swell   • Prilosec [Omeprazole] Diarrhea   • Epinephrine Nausea Only     Jittery and nausea       Objective     Vital Signs  Temp:  [98.6 °F (37 °C)] 98.6 °F (37 °C)  Heart Rate:  [103] 103  Resp:  [20] 20  BP: (127)/(85) 127/85    Physical Exam:      General Appearance:    Alert, cooperative, in no acute distress   Head:    Normocephalic, without obvious abnormality, atraumatic   Eyes:            Lids and lashes normal, conjunctivae and sclerae normal, no   icterus, no pallor, corneas clear, PERRLA   Ears:    Ears appear intact with no abnormalities noted   Throat:   No oral lesions, no thrush, oral mucosa moist   Neck:   No adenopathy, supple, trachea midline, no thyromegaly, no     carotid bruit, no JVD   Back:     No kyphosis present, no scoliosis present, no skin lesions,       erythema or scars, no tenderness to percussion or                   palpation,   range of motion normal   Lungs:     Clear to auscultation,respirations regular, even and                   unlabored    Heart:    Regular rhythm and normal rate, normal S1 and S2, no            murmur, no gallop, no rub, no click   Breast Exam:    Deferred   Abdomen:     Normal bowel sounds, no masses, no organomegaly, soft        non-tender, non-distended, no guarding, no rebound                 tenderness   Genitalia:    Deferred   Extremities:   Moves all extremities well, no edema, no cyanosis, no              redness   Pulses:   Pulses palpable and equal bilaterally   Skin:   No bleeding, bruising or rash   Lymph nodes:   No palpable adenopathy   Neurologic:   Cranial nerves 2 - 12 grossly intact, sensation intact, DTR        present and equal bilaterally       Results Review:    I reviewed the patient's new clinical results.    Assessment/Plan       * No active hospital problems. *      Hysteroscopy d & C

## 2021-03-17 NOTE — ANESTHESIA POSTPROCEDURE EVALUATION
Patient: Debbie Clemens    Procedure Summary     Date: 03/17/21 Room / Location:  [facility] /  [facility]    Anesthesia Start: 1000 Anesthesia Stop:     Procedure: HYSTEROSCOPY DILATION & CURRETAGE (N/A Uterus) Diagnosis:     Surgeons: Liv Lorenzana MD Provider: Al Gaytan MD    Anesthesia Type: general ASA Status: 2          Anesthesia Type: general    Vitals  HR 78  RR 14  Temp 98 F  /71  O2 Sat 96%        Post Anesthesia Care and Evaluation    Patient location during evaluation: PACU  Patient participation: complete - patient participated  Level of consciousness: awake and alert  Pain management: adequate  Airway patency: patent  Anesthetic complications: No anesthetic complications  PONV Status: none  Cardiovascular status: hemodynamically stable and acceptable  Respiratory status: nonlabored ventilation, acceptable and nasal cannula  Hydration status: acceptable

## 2021-03-17 NOTE — OP NOTE
GYN Operative Note    Patient Name, age and sex:  Debbie Clemens, 45 y.o., female  Procedure Date:  3/17/2021    Surgeon(s) and Role:     * Liv Lorenzaan MD - Primary    Additional Staff:  Iqra Barroso  Medically necessary for assistance.Complex retraction. Suturing skills.Complex and critical patient postioning.  Laparoscopic instrument use and manipulation.     * No Diagnosis Codes entered *    * No Diagnosis Codes entered *    Procedure(s):  HYSTEROSCOPY DILATION & CURRETAGE    Indications for Operation: abnormal uterine bleeding. Menorrhagia leading to anemia.     Antibiotics:  cefazolin (Ancef) ordered on call to OR    Anesthesia:  Type: General -   ASA:  II    Operative Findings:  Thick polypoid endometrium,  Otherwise normal multiarous cavity. No fibriods in the endometrium,  No adenxal masses.  Uterus 10 wk size, good support good mobility though.     Specimens Removed:    Specimens     ID Source Type Tests Collected By Collected At Frozen?    A Endometrium Curettings · TISSUE PATHOLOGY EXAM   Liv Lorenzana MD 3/17/21 1025     Description: endometrium currettings          Estimated Blood Loss:  50 mL    Urine Output:  na mL    Complications: none    Procedure Narrative: Patient was taken to the operating room where general anesthesia was found be adequate.  She was then prepped draped normal sterile fashion dorsal lithotomy position in [facility].  Speculum placed cervix grasped at 12:00 dilated to #8 Hegar dilator hysteroscope placed adequate visualization obtained.  Sharp curettage was then performed to a gritty texture was noted throughout all endometrial curettings sent to pathology.  Repeat hysteroscopy shows clear uniform regular cavity.  All the instruments were then removed.  She was cleansed from all prep.  Hemostasis confirmed.  She was then taken out of lithotomy position final count correct taken to recovery in stable condition.    Liv Lorenzana MD -  3/17/2021, 10:46 EDT

## 2021-03-17 NOTE — ANESTHESIA PREPROCEDURE EVALUATION
Anesthesia Evaluation     Patient summary reviewed and Nursing notes reviewed   history of anesthetic complications: prolonged sedation               Airway   Mallampati: I  TM distance: >3 FB  Neck ROM: full  No difficulty expected  Dental - normal exam     Pulmonary - normal exam   (+) a smoker Former,   Cardiovascular - negative cardio ROS and normal exam        Neuro/Psych- negative ROS  GI/Hepatic/Renal/Endo    (+) obesity,  GI bleeding ,     ROS Comment: CROHN'S DZ    Musculoskeletal (-) negative ROS    Abdominal  - normal exam    Bowel sounds: normal.   Substance History - negative use     OB/GYN negative ob/gyn ROS         Other      history of cancer (BREAST)                    Anesthesia Plan    ASA 2     general     intravenous induction     Anesthetic plan, all risks, benefits, and alternatives have been provided, discussed and informed consent has been obtained with: patient.    Plan discussed with CRNA.

## 2021-03-17 NOTE — ANESTHESIA PROCEDURE NOTES
Airway  Urgency: elective    Date/Time: 3/17/2021 10:07 AM  Airway not difficult    General Information and Staff    Patient location during procedure: OR  CRNA: Krista Bach CRNA    Indications and Patient Condition  Indications for airway management: airway protection    Preoxygenated: yes  Mask difficulty assessment: 1 - vent by mask    Final Airway Details  Final airway type: supraglottic airway      Successful airway: I-gel  Size 4    Number of attempts at approach: 1  Assessment: lips, teeth, and gum same as pre-op    Additional Comments  LMA placed without difficulty, ventilation with assist, equal breath sounds and symmetric chest rise and fall

## 2021-03-19 LAB
BH BB BLOOD EXPIRATION DATE: NORMAL
BH BB BLOOD EXPIRATION DATE: NORMAL
BH BB BLOOD TYPE BARCODE: 5100
BH BB BLOOD TYPE BARCODE: 5100
BH BB DISPENSE STATUS: NORMAL
BH BB DISPENSE STATUS: NORMAL
BH BB PRODUCT CODE: NORMAL
BH BB PRODUCT CODE: NORMAL
BH BB UNIT NUMBER: NORMAL
BH BB UNIT NUMBER: NORMAL
CROSSMATCH INTERPRETATION: NORMAL
CROSSMATCH INTERPRETATION: NORMAL
UNIT  ABO: NORMAL
UNIT  ABO: NORMAL
UNIT  RH: NORMAL
UNIT  RH: NORMAL

## 2021-04-06 ENCOUNTER — TELEPHONE (OUTPATIENT)
Dept: GASTROENTEROLOGY | Facility: CLINIC | Age: 46
End: 2021-04-06

## 2021-04-06 NOTE — TELEPHONE ENCOUNTER
Patient called and states she is needing to have a hysterectomy and would like to know if she is okay to go ahead or if she needs to wait until after her entyvio infusion.

## 2021-05-03 ENCOUNTER — APPOINTMENT (OUTPATIENT)
Dept: PREADMISSION TESTING | Facility: HOSPITAL | Age: 46
End: 2021-05-03

## 2021-05-03 ENCOUNTER — PRE-ADMISSION TESTING (OUTPATIENT)
Dept: PREADMISSION TESTING | Facility: HOSPITAL | Age: 46
End: 2021-05-03

## 2021-05-03 VITALS — BODY MASS INDEX: 36.25 KG/M2 | WEIGHT: 212.3 LBS | HEIGHT: 64 IN

## 2021-05-03 LAB
ANION GAP SERPL CALCULATED.3IONS-SCNC: 7 MMOL/L (ref 5–15)
B-HCG UR QL: NEGATIVE
BUN SERPL-MCNC: 13 MG/DL (ref 6–20)
BUN/CREAT SERPL: 20 (ref 7–25)
CALCIUM SPEC-SCNC: 9.1 MG/DL (ref 8.6–10.5)
CHLORIDE SERPL-SCNC: 104 MMOL/L (ref 98–107)
CO2 SERPL-SCNC: 27 MMOL/L (ref 22–29)
CREAT SERPL-MCNC: 0.65 MG/DL (ref 0.57–1)
DEPRECATED RDW RBC AUTO: 50.4 FL (ref 37–54)
ERYTHROCYTE [DISTWIDTH] IN BLOOD BY AUTOMATED COUNT: 15.2 % (ref 12.3–15.4)
GFR SERPL CREATININE-BSD FRML MDRD: 99 ML/MIN/1.73
GLUCOSE SERPL-MCNC: 98 MG/DL (ref 65–99)
HCT VFR BLD AUTO: 36.1 % (ref 34–46.6)
HGB BLD-MCNC: 11 G/DL (ref 12–15.9)
MCH RBC QN AUTO: 28 PG (ref 26.6–33)
MCHC RBC AUTO-ENTMCNC: 30.5 G/DL (ref 31.5–35.7)
MCV RBC AUTO: 91.9 FL (ref 79–97)
PLATELET # BLD AUTO: 408 10*3/MM3 (ref 140–450)
PMV BLD AUTO: 9.6 FL (ref 6–12)
POTASSIUM SERPL-SCNC: 4.3 MMOL/L (ref 3.5–5.2)
RBC # BLD AUTO: 3.93 10*6/MM3 (ref 3.77–5.28)
SARS-COV-2 RNA PNL SPEC NAA+PROBE: NOT DETECTED
SODIUM SERPL-SCNC: 138 MMOL/L (ref 136–145)
WBC # BLD AUTO: 9.45 10*3/MM3 (ref 3.4–10.8)

## 2021-05-03 PROCEDURE — 36415 COLL VENOUS BLD VENIPUNCTURE: CPT

## 2021-05-03 PROCEDURE — 85027 COMPLETE CBC AUTOMATED: CPT

## 2021-05-03 PROCEDURE — U0004 COV-19 TEST NON-CDC HGH THRU: HCPCS

## 2021-05-03 PROCEDURE — 81025 URINE PREGNANCY TEST: CPT

## 2021-05-03 PROCEDURE — 80048 BASIC METABOLIC PNL TOTAL CA: CPT

## 2021-05-03 PROCEDURE — C9803 HOPD COVID-19 SPEC COLLECT: HCPCS

## 2021-05-04 ENCOUNTER — ANESTHESIA EVENT (OUTPATIENT)
Dept: PERIOP | Facility: HOSPITAL | Age: 46
End: 2021-05-04

## 2021-05-04 NOTE — ANESTHESIA PREPROCEDURE EVALUATION
Anesthesia Evaluation     Patient summary reviewed   history of anesthetic complications: prolonged sedation  NPO Solid Status: > 8 hours  NPO Liquid Status: > 8 hours           Airway   Mallampati: II  TM distance: >3 FB  Neck ROM: full  No difficulty expected  Dental - normal exam     Pulmonary - normal exam   (+) a smoker Former,   (-) COPD, asthma, shortness of breath, sleep apnea, no home oxygen  Cardiovascular - normal exam    ECG reviewed    (-) CAD, dysrhythmias, angina, CHF, cardiac stents    ROS comment: 2014-lvef 55-60, mild mr/tr/trace pi; rvsp 25-30     Neuro/Psych  (-) seizures, TIA, CVA  GI/Hepatic/Renal/Endo    (+) obesity,  GERD,    (-) hepatitis, liver disease, no renal disease, diabetes, no thyroid disorder    Musculoskeletal     Abdominal    Substance History - negative use     OB/GYN          Other      history of cancer (breast ca )    ROS/Med Hx Other: crohns disease  hgb 11 plt 408 k 4.3                Anesthesia Plan    ASA 2     general   (Risks and benefits of general anesthesia discussed with patient (including MI, CVA, death, recall, aspiration), questions answered, agreeable to proceed.    Scop patch + propofol gtt)  intravenous induction     Anesthetic plan, all risks, benefits, and alternatives have been provided, discussed and informed consent has been obtained with: patient.  Use of blood products discussed with patient  Consented to blood products.   Plan discussed with CRNA.

## 2021-05-05 ENCOUNTER — HOSPITAL ENCOUNTER (OUTPATIENT)
Facility: HOSPITAL | Age: 46
Setting detail: HOSPITAL OUTPATIENT SURGERY
Discharge: HOME | End: 2021-05-05
Attending: OBSTETRICS & GYNECOLOGY | Admitting: OBSTETRICS & GYNECOLOGY

## 2021-05-05 ENCOUNTER — ANESTHESIA (OUTPATIENT)
Dept: PERIOP | Facility: HOSPITAL | Age: 46
End: 2021-05-05

## 2021-05-05 VITALS
HEIGHT: 64 IN | WEIGHT: 212 LBS | DIASTOLIC BLOOD PRESSURE: 89 MMHG | TEMPERATURE: 97.6 F | BODY MASS INDEX: 36.19 KG/M2 | OXYGEN SATURATION: 100 % | RESPIRATION RATE: 16 BRPM | SYSTOLIC BLOOD PRESSURE: 131 MMHG | HEART RATE: 85 BPM

## 2021-05-05 DIAGNOSIS — N85.00 ENDOMETRIAL HYPERPLASIA: ICD-10-CM

## 2021-05-05 LAB
B-HCG UR QL: NEGATIVE
INTERNAL NEGATIVE CONTROL: NEGATIVE
INTERNAL POSITIVE CONTROL: POSITIVE
Lab: NORMAL

## 2021-05-05 PROCEDURE — 25010000002 FENTANYL CITRATE (PF) 250 MCG/5ML SOLUTION: Performed by: NURSE ANESTHETIST, CERTIFIED REGISTERED

## 2021-05-05 PROCEDURE — 25010000002 HEPARIN (PORCINE) PER 1000 UNITS: Performed by: OBSTETRICS & GYNECOLOGY

## 2021-05-05 PROCEDURE — 25010000003 CEFAZOLIN IN DEXTROSE 2-4 GM/100ML-% SOLUTION: Performed by: OBSTETRICS & GYNECOLOGY

## 2021-05-05 PROCEDURE — 25010000002 PROPOFOL 10 MG/ML EMULSION: Performed by: NURSE ANESTHETIST, CERTIFIED REGISTERED

## 2021-05-05 PROCEDURE — 81025 URINE PREGNANCY TEST: CPT | Performed by: ANESTHESIOLOGY

## 2021-05-05 PROCEDURE — 25010000002 KETOROLAC TROMETHAMINE PER 15 MG: Performed by: NURSE ANESTHETIST, CERTIFIED REGISTERED

## 2021-05-05 PROCEDURE — 88307 TISSUE EXAM BY PATHOLOGIST: CPT | Performed by: OBSTETRICS & GYNECOLOGY

## 2021-05-05 PROCEDURE — C1889 IMPLANT/INSERT DEVICE, NOC: HCPCS | Performed by: OBSTETRICS & GYNECOLOGY

## 2021-05-05 PROCEDURE — 25010000002 DEXAMETHASONE PER 1 MG: Performed by: NURSE ANESTHETIST, CERTIFIED REGISTERED

## 2021-05-05 PROCEDURE — 25010000002 NEOSTIGMINE 10 MG/10ML SOLUTION: Performed by: NURSE ANESTHETIST, CERTIFIED REGISTERED

## 2021-05-05 PROCEDURE — 25010000002 ONDANSETRON PER 1 MG: Performed by: NURSE ANESTHETIST, CERTIFIED REGISTERED

## 2021-05-05 DEVICE — SEALANT FIBRIN TISSEEL FZ 4ML: Type: IMPLANTABLE DEVICE | Site: ABDOMEN | Status: FUNCTIONAL

## 2021-05-05 RX ORDER — LIDOCAINE HYDROCHLORIDE 10 MG/ML
INJECTION, SOLUTION EPIDURAL; INFILTRATION; INTRACAUDAL; PERINEURAL AS NEEDED
Status: DISCONTINUED | OUTPATIENT
Start: 2021-05-05 | End: 2021-05-05 | Stop reason: SURG

## 2021-05-05 RX ORDER — FAMOTIDINE 10 MG/ML
20 INJECTION, SOLUTION INTRAVENOUS ONCE
Status: DISCONTINUED | OUTPATIENT
Start: 2021-05-05 | End: 2021-05-05

## 2021-05-05 RX ORDER — HYDROCODONE BITARTRATE AND ACETAMINOPHEN 7.5; 325 MG/1; MG/1
1 TABLET ORAL ONCE AS NEEDED
Status: DISCONTINUED | OUTPATIENT
Start: 2021-05-05 | End: 2021-05-05 | Stop reason: HOSPADM

## 2021-05-05 RX ORDER — HYDROCODONE BITARTRATE AND ACETAMINOPHEN 7.5; 325 MG/1; MG/1
1 TABLET ORAL ONCE AS NEEDED
Status: CANCELLED | OUTPATIENT
Start: 2021-05-05

## 2021-05-05 RX ORDER — HYDROMORPHONE HYDROCHLORIDE 1 MG/ML
0.5 INJECTION, SOLUTION INTRAMUSCULAR; INTRAVENOUS; SUBCUTANEOUS
Status: CANCELLED | OUTPATIENT
Start: 2021-05-05

## 2021-05-05 RX ORDER — FENTANYL CITRATE 50 UG/ML
INJECTION, SOLUTION INTRAMUSCULAR; INTRAVENOUS AS NEEDED
Status: DISCONTINUED | OUTPATIENT
Start: 2021-05-05 | End: 2021-05-05 | Stop reason: SURG

## 2021-05-05 RX ORDER — SCOLOPAMINE TRANSDERMAL SYSTEM 1 MG/1
1 PATCH, EXTENDED RELEASE TRANSDERMAL ONCE
Status: DISCONTINUED | OUTPATIENT
Start: 2021-05-05 | End: 2021-05-05 | Stop reason: HOSPADM

## 2021-05-05 RX ORDER — CEFAZOLIN SODIUM 2 G/100ML
2 INJECTION, SOLUTION INTRAVENOUS ONCE
Status: COMPLETED | OUTPATIENT
Start: 2021-05-05 | End: 2021-05-05

## 2021-05-05 RX ORDER — GLYCOPYRROLATE 0.2 MG/ML
INJECTION INTRAMUSCULAR; INTRAVENOUS AS NEEDED
Status: DISCONTINUED | OUTPATIENT
Start: 2021-05-05 | End: 2021-05-05 | Stop reason: SURG

## 2021-05-05 RX ORDER — SODIUM CHLORIDE 0.9 % (FLUSH) 0.9 %
10 SYRINGE (ML) INJECTION AS NEEDED
Status: DISCONTINUED | OUTPATIENT
Start: 2021-05-05 | End: 2021-05-05 | Stop reason: HOSPADM

## 2021-05-05 RX ORDER — KETOROLAC TROMETHAMINE 30 MG/ML
INJECTION, SOLUTION INTRAMUSCULAR; INTRAVENOUS AS NEEDED
Status: DISCONTINUED | OUTPATIENT
Start: 2021-05-05 | End: 2021-05-05 | Stop reason: SURG

## 2021-05-05 RX ORDER — SODIUM CHLORIDE 0.9 % (FLUSH) 0.9 %
10 SYRINGE (ML) INJECTION EVERY 12 HOURS SCHEDULED
Status: DISCONTINUED | OUTPATIENT
Start: 2021-05-05 | End: 2021-05-05 | Stop reason: HOSPADM

## 2021-05-05 RX ORDER — ONDANSETRON 2 MG/ML
INJECTION INTRAMUSCULAR; INTRAVENOUS AS NEEDED
Status: DISCONTINUED | OUTPATIENT
Start: 2021-05-05 | End: 2021-05-05 | Stop reason: SURG

## 2021-05-05 RX ORDER — FAMOTIDINE 20 MG/1
20 TABLET, FILM COATED ORAL ONCE
Status: COMPLETED | OUTPATIENT
Start: 2021-05-05 | End: 2021-05-05

## 2021-05-05 RX ORDER — NEOSTIGMINE METHYLSULFATE 1 MG/ML
INJECTION, SOLUTION INTRAVENOUS AS NEEDED
Status: DISCONTINUED | OUTPATIENT
Start: 2021-05-05 | End: 2021-05-05 | Stop reason: SURG

## 2021-05-05 RX ORDER — DROPERIDOL 2.5 MG/ML
0.62 INJECTION, SOLUTION INTRAMUSCULAR; INTRAVENOUS ONCE AS NEEDED
Status: CANCELLED | OUTPATIENT
Start: 2021-05-05

## 2021-05-05 RX ORDER — LIDOCAINE HYDROCHLORIDE 10 MG/ML
0.5 INJECTION, SOLUTION EPIDURAL; INFILTRATION; INTRACAUDAL; PERINEURAL ONCE AS NEEDED
Status: COMPLETED | OUTPATIENT
Start: 2021-05-05 | End: 2021-05-05

## 2021-05-05 RX ORDER — ACETAMINOPHEN 500 MG
1000 TABLET ORAL ONCE
Status: COMPLETED | OUTPATIENT
Start: 2021-05-05 | End: 2021-05-05

## 2021-05-05 RX ORDER — ATRACURIUM BESYLATE 10 MG/ML
INJECTION, SOLUTION INTRAVENOUS AS NEEDED
Status: DISCONTINUED | OUTPATIENT
Start: 2021-05-05 | End: 2021-05-05 | Stop reason: SURG

## 2021-05-05 RX ORDER — MIDAZOLAM HYDROCHLORIDE 1 MG/ML
1 INJECTION INTRAMUSCULAR; INTRAVENOUS
Status: DISCONTINUED | OUTPATIENT
Start: 2021-05-05 | End: 2021-05-05 | Stop reason: HOSPADM

## 2021-05-05 RX ORDER — BUPIVACAINE HYDROCHLORIDE 5 MG/ML
INJECTION, SOLUTION PERINEURAL AS NEEDED
Status: DISCONTINUED | OUTPATIENT
Start: 2021-05-05 | End: 2021-05-05 | Stop reason: HOSPADM

## 2021-05-05 RX ORDER — HEPARIN SODIUM 5000 [USP'U]/ML
INJECTION, SOLUTION INTRAVENOUS; SUBCUTANEOUS AS NEEDED
Status: DISCONTINUED | OUTPATIENT
Start: 2021-05-05 | End: 2021-05-05 | Stop reason: HOSPADM

## 2021-05-05 RX ORDER — SODIUM CHLORIDE 9 MG/ML
INJECTION, SOLUTION INTRAVENOUS AS NEEDED
Status: DISCONTINUED | OUTPATIENT
Start: 2021-05-05 | End: 2021-05-05 | Stop reason: HOSPADM

## 2021-05-05 RX ORDER — DEXAMETHASONE SODIUM PHOSPHATE 4 MG/ML
INJECTION, SOLUTION INTRA-ARTICULAR; INTRALESIONAL; INTRAMUSCULAR; INTRAVENOUS; SOFT TISSUE AS NEEDED
Status: DISCONTINUED | OUTPATIENT
Start: 2021-05-05 | End: 2021-05-05 | Stop reason: SURG

## 2021-05-05 RX ORDER — ONDANSETRON 2 MG/ML
4 INJECTION INTRAMUSCULAR; INTRAVENOUS ONCE AS NEEDED
Status: CANCELLED | OUTPATIENT
Start: 2021-05-05

## 2021-05-05 RX ORDER — ONDANSETRON 4 MG/1
4 TABLET, FILM COATED ORAL ONCE AS NEEDED
Status: DISCONTINUED | OUTPATIENT
Start: 2021-05-05 | End: 2021-05-05 | Stop reason: HOSPADM

## 2021-05-05 RX ORDER — MAGNESIUM HYDROXIDE 1200 MG/15ML
LIQUID ORAL AS NEEDED
Status: DISCONTINUED | OUTPATIENT
Start: 2021-05-05 | End: 2021-05-05 | Stop reason: HOSPADM

## 2021-05-05 RX ORDER — SODIUM CHLORIDE, SODIUM LACTATE, POTASSIUM CHLORIDE, CALCIUM CHLORIDE 600; 310; 30; 20 MG/100ML; MG/100ML; MG/100ML; MG/100ML
9 INJECTION, SOLUTION INTRAVENOUS CONTINUOUS
Status: DISCONTINUED | OUTPATIENT
Start: 2021-05-05 | End: 2021-05-05 | Stop reason: HOSPADM

## 2021-05-05 RX ORDER — HEPARIN SODIUM 5000 [USP'U]/ML
5000 INJECTION, SOLUTION INTRAVENOUS; SUBCUTANEOUS ONCE
Status: DISCONTINUED | OUTPATIENT
Start: 2021-05-05 | End: 2021-05-05 | Stop reason: HOSPADM

## 2021-05-05 RX ORDER — PROPOFOL 10 MG/ML
VIAL (ML) INTRAVENOUS AS NEEDED
Status: DISCONTINUED | OUTPATIENT
Start: 2021-05-05 | End: 2021-05-05 | Stop reason: SURG

## 2021-05-05 RX ORDER — FENTANYL CITRATE 50 UG/ML
50 INJECTION, SOLUTION INTRAMUSCULAR; INTRAVENOUS
Status: CANCELLED | OUTPATIENT
Start: 2021-05-05

## 2021-05-05 RX ADMIN — ATRACURIUM BESYLATE 40 MG: 10 INJECTION, SOLUTION INTRAVENOUS at 07:42

## 2021-05-05 RX ADMIN — SODIUM CHLORIDE, POTASSIUM CHLORIDE, SODIUM LACTATE AND CALCIUM CHLORIDE: 600; 310; 30; 20 INJECTION, SOLUTION INTRAVENOUS at 08:23

## 2021-05-05 RX ADMIN — SCOPALAMINE 1 PATCH: 1 PATCH, EXTENDED RELEASE TRANSDERMAL at 06:30

## 2021-05-05 RX ADMIN — LIDOCAINE HYDROCHLORIDE 0.5 ML: 10 INJECTION, SOLUTION EPIDURAL; INFILTRATION; INTRACAUDAL; PERINEURAL at 06:13

## 2021-05-05 RX ADMIN — KETOROLAC TROMETHAMINE 30 MG: 30 INJECTION, SOLUTION INTRAMUSCULAR; INTRAVENOUS at 09:01

## 2021-05-05 RX ADMIN — ONDANSETRON 4 MG: 2 INJECTION INTRAMUSCULAR; INTRAVENOUS at 08:56

## 2021-05-05 RX ADMIN — ACETAMINOPHEN 1000 MG: 500 TABLET ORAL at 06:30

## 2021-05-05 RX ADMIN — PROPOFOL 200 MG: 10 INJECTION, EMULSION INTRAVENOUS at 07:42

## 2021-05-05 RX ADMIN — FENTANYL CITRATE 150 MCG: 50 INJECTION, SOLUTION INTRAMUSCULAR; INTRAVENOUS at 08:25

## 2021-05-05 RX ADMIN — LIDOCAINE HYDROCHLORIDE 50 MG: 10 INJECTION, SOLUTION EPIDURAL; INFILTRATION; INTRACAUDAL; PERINEURAL at 07:42

## 2021-05-05 RX ADMIN — FENTANYL CITRATE 50 MCG: 50 INJECTION, SOLUTION INTRAMUSCULAR; INTRAVENOUS at 09:19

## 2021-05-05 RX ADMIN — NEOSTIGMINE 2 MG: 1 INJECTION INTRAVENOUS at 09:30

## 2021-05-05 RX ADMIN — HYDROCODONE BITARTRATE AND ACETAMINOPHEN 1 TABLET: 7.5; 325 TABLET ORAL at 12:12

## 2021-05-05 RX ADMIN — DEXAMETHASONE SODIUM PHOSPHATE 8 MG: 4 INJECTION, SOLUTION INTRA-ARTICULAR; INTRALESIONAL; INTRAMUSCULAR; INTRAVENOUS; SOFT TISSUE at 07:42

## 2021-05-05 RX ADMIN — GLYCOPYRROLATE 0.1 MG: 0.4 INJECTION INTRAMUSCULAR; INTRAVENOUS at 09:30

## 2021-05-05 RX ADMIN — FAMOTIDINE 20 MG: 20 TABLET, FILM COATED ORAL at 06:30

## 2021-05-05 RX ADMIN — CEFAZOLIN SODIUM 2 G: 2 INJECTION, SOLUTION INTRAVENOUS at 07:42

## 2021-05-05 RX ADMIN — SODIUM CHLORIDE, POTASSIUM CHLORIDE, SODIUM LACTATE AND CALCIUM CHLORIDE 9 ML/HR: 600; 310; 30; 20 INJECTION, SOLUTION INTRAVENOUS at 06:13

## 2021-05-05 RX ADMIN — FENTANYL CITRATE 50 MCG: 50 INJECTION, SOLUTION INTRAMUSCULAR; INTRAVENOUS at 08:57

## 2021-05-05 NOTE — ANESTHESIA PROCEDURE NOTES
Airway  Urgency: elective    Airway not difficult    General Information and Staff    Patient location during procedure: OR    Indications and Patient Condition  Indications for airway management: airway protection    Preoxygenated: yes  MILS not maintained throughout  Mask difficulty assessment: 1 - vent by mask    Final Airway Details  Final airway type: endotracheal airway      Successful airway: ETT  Cuffed: yes   Successful intubation technique: direct laryngoscopy  Endotracheal tube insertion site: oral  Blade: Preeti  Blade size: 3  ETT size (mm): 6.5  Cormack-Lehane Classification: grade I - full view of glottis  Placement verified by: chest auscultation and capnometry   Measured from: lips  ETT/EBT  to lips (cm): 20  Number of attempts at approach: 1  Assessment: lips, teeth, and gum same as pre-op and atraumatic intubation

## 2021-05-05 NOTE — ANESTHESIA POSTPROCEDURE EVALUATION
Patient: Debbie Clemens    Procedure Summary     Date: 05/05/21 Room / Location:  [facility]    Anesthesia Start: 0738 Anesthesia Stop: 0954    Procedure: ROBOTIC ASSISTED HYSTERECTOMY WITH BILATERAL SALPINGO-OOPHORECTOMY AND CYSTOSCOPY (N/A Abdomen) Diagnosis:     Surgeons: Liv Lorenzana MD Provider: Ysabel Moise DO    Anesthesia Type: general ASA Status: 2          Anesthesia Type: general    Vitals  Vitals Value Taken Time   /86 05/05/21 0953   Temp 97.5 °F (36.4 °C) 05/05/21 0953   Pulse 96 05/05/21 0953   Resp 16 05/05/21 0953   SpO2 100 % 05/05/21 0953           Post Anesthesia Care and Evaluation    Patient location during evaluation: PACU  Patient participation: complete - patient participated  Level of consciousness: awake and alert  Pain management: adequate  Airway patency: patent  Anesthetic complications: No anesthetic complications  PONV Status: none  Cardiovascular status: acceptable  Respiratory status: acceptable  Hydration status: acceptable

## 2021-06-29 ENCOUNTER — OFFICE VISIT (OUTPATIENT)
Dept: GASTROENTEROLOGY | Facility: CLINIC | Age: 46
End: 2021-06-29

## 2021-06-29 ENCOUNTER — LAB (OUTPATIENT)
Dept: LAB | Facility: HOSPITAL | Age: 46
End: 2021-06-29

## 2021-06-29 VITALS
DIASTOLIC BLOOD PRESSURE: 80 MMHG | TEMPERATURE: 97.1 F | BODY MASS INDEX: 34.52 KG/M2 | OXYGEN SATURATION: 98 % | SYSTOLIC BLOOD PRESSURE: 140 MMHG | HEIGHT: 64 IN | WEIGHT: 202.2 LBS | HEART RATE: 111 BPM

## 2021-06-29 DIAGNOSIS — K50.10 CROHN'S DISEASE OF LARGE INTESTINE WITHOUT COMPLICATION (HCC): Primary | ICD-10-CM

## 2021-06-29 DIAGNOSIS — E53.8 VITAMIN B 12 DEFICIENCY: ICD-10-CM

## 2021-06-29 DIAGNOSIS — Z79.899 LONG TERM CURRENT USE OF IMMUNOSUPPRESSIVE DRUG: ICD-10-CM

## 2021-06-29 PROBLEM — L03.221 CELLULITIS OF NECK: Status: ACTIVE | Noted: 2021-06-29

## 2021-06-29 PROBLEM — J01.90 ACUTE INFECTION OF NASAL SINUS: Status: ACTIVE | Noted: 2021-06-29

## 2021-06-29 PROBLEM — H54.7 VISUAL IMPAIRMENT: Status: ACTIVE | Noted: 2019-05-09

## 2021-06-29 PROCEDURE — 36415 COLL VENOUS BLD VENIPUNCTURE: CPT | Performed by: INTERNAL MEDICINE

## 2021-06-29 PROCEDURE — 85025 COMPLETE CBC W/AUTO DIFF WBC: CPT | Performed by: INTERNAL MEDICINE

## 2021-06-29 PROCEDURE — 99214 OFFICE O/P EST MOD 30 MIN: CPT | Performed by: INTERNAL MEDICINE

## 2021-06-29 PROCEDURE — 82607 VITAMIN B-12: CPT | Performed by: INTERNAL MEDICINE

## 2021-06-29 RX ORDER — HYDROCODONE BITARTRATE AND ACETAMINOPHEN 7.5; 3 MG/1; MG/1
TABLET ORAL EVERY 6 HOURS
COMMUNITY
End: 2021-06-29

## 2021-06-29 RX ORDER — FERROUS SULFATE 137(45) MG
TABLET, EXTENDED RELEASE ORAL
COMMUNITY
Start: 2021-03-15 | End: 2021-06-29

## 2021-06-29 RX ORDER — CYANOCOBALAMIN 1000 UG/ML
INJECTION, SOLUTION INTRAMUSCULAR; SUBCUTANEOUS
COMMUNITY
Start: 2021-06-28 | End: 2021-07-29

## 2021-06-29 RX ORDER — IBUPROFEN 600 MG/1
600 TABLET ORAL EVERY 8 HOURS PRN
COMMUNITY
End: 2021-07-29

## 2021-06-29 RX ORDER — HYDROCODONE BITARTRATE AND ACETAMINOPHEN 7.5; 325 MG/1; MG/1
1 TABLET ORAL EVERY 6 HOURS
COMMUNITY
Start: 2021-05-04 | End: 2021-06-29

## 2021-06-29 NOTE — PROGRESS NOTES
Patient Name: Debbie Clemens  YOB: 1975   Medical Record number: [id number]     Chief Complaint: Crohn's disease    HPI Debbie is a 46-year-old female.  I have taken care of her since she was a teenager.  She has known Crohn's disease.  She has been treated with Entyvio most recently with control of her symptoms and her disease.  She receives the infusions every 2 months.  The most recently is recovering from hysterectomy.  She has no GI complaints.    Past Medical History:   Past Medical History:   Diagnosis Date   • Breast cancer (CMS/HCC)    • Cancer (CMS/HCC)     breast cancer- left    • Crohn's disease (CMS/HCC)    • Heartburn     tums prn    • Slow to wake up after anesthesia     for colonoscopy - d and c was fine    • Wears glasses        Family History:  Family History   Problem Relation Age of Onset   • Hypertension Other    • Heart disease Other    • Hyperlipidemia Other    • Ovarian cancer Neg Hx    • Breast cancer Neg Hx        Social History:   reports that she quit smoking about 11 years ago. Her smoking use included cigarettes. She has a 11.25 pack-year smoking history. She has never used smokeless tobacco. She reports current alcohol use. She reports that she does not use drugs.    Medications:     Current Outpatient Medications:   •  Cyanocobalamin (B-12 Compliance Injection) 1000 MCG/ML kit, Inject 1,000 mcg as directed Every 30 (Thirty) Days., Disp: 1 kit, Rfl: 12  •  Ferrous Sulfate ER (Slow Fe) 142 (45 Fe) MG tablet controlled-release, Slow Fe 142 mg (45 mg iron) tablet,extended release  Take 1 tablet every day by oral route., Disp: , Rfl:   •  ibuprofen (ADVIL,MOTRIN) 600 MG tablet, Take 600 mg by mouth Every 8 (Eight) Hours As Needed., Disp: , Rfl:   •  loratadine (CLARITIN) 10 MG tablet, Take 10 mg by mouth Daily., Disp: , Rfl:   •  multivitamin with minerals (MULTIVITAMIN ADULT PO), Take 1 tablet by mouth Daily., Disp: , Rfl:   •  Pseudoephedrine-Naproxen Na (ALEVE-D SINUS &  "COLD PO), , Disp: , Rfl:   •  Vedolizumab (ENTYVIO IV), Infuse  into a venous catheter. Every 8 weeks-  2 weeks after surgery will have, Disp: , Rfl:   •  cyanocobalamin 1000 MCG/ML injection, , Disp: , Rfl:   •  HYDROcodone-acetaminophen (NORCO) 7.5-325 MG per tablet, Take 1 tablet by mouth Every 6 (Six) Hours., Disp: , Rfl:   •  HYDROcodone-Acetaminophen (XODOL) 7.5-300 MG per tablet, Every 6 (Six) Hours., Disp: , Rfl:     Allergies:  Lialda [mesalamine], Nuts, Prilosec [omeprazole], Tree nut, and Epinephrine    Review of Systems   Constitutional: Negative for activity change, appetite change, fatigue, fever and unexpected weight change.   HENT: Negative for hearing loss, trouble swallowing and voice change.    Eyes: Negative for visual disturbance.   Respiratory: Negative for cough, choking, chest tightness and shortness of breath.    Cardiovascular: Negative for chest pain.   Gastrointestinal: Negative for abdominal distention, abdominal pain, anal bleeding, blood in stool, constipation, diarrhea, nausea, rectal pain and vomiting.   Endocrine: Negative for polydipsia and polyphagia.   Genitourinary: Negative.    Musculoskeletal: Negative for gait problem and joint swelling.   Skin: Negative for color change and rash.   Allergic/Immunologic: Negative for food allergies.   Neurological: Negative for dizziness, seizures and speech difficulty.   Hematological: Negative for adenopathy.   Psychiatric/Behavioral: Negative for confusion.         Vitals:   /80 (BP Location: Right arm, Patient Position: Sitting, Cuff Size: Adult)   Pulse 111   Temp 97.1 °F (36.2 °C) (Temporal)   Ht 162.6 cm (64\")   Wt 91.7 kg (202 lb 3.2 oz)   LMP 02/05/2021 (Approximate)   SpO2 98%   BMI 34.71 kg/m²      Physical Exam:   Constitutional: Pt is oriented to person, place, and time and well-developed, well-nourished, and in no distress.     .     .   Abdominal: Soft. Bowel sounds are normal.   Skin: Skin is warm and dry. "   Psychiatric: Mood, memory, affect and judgment normal.           Assessment and Plan Debbie's disease remains in remission.  Her heart rate is up and she is to see her new primary care with regards to this.  She does have some hip pain I recommended some x-rays from her primary care when she establishes this.  She was on steroids as a child and certainly is a set up for possibly some problems related to this.  A CBC and B12 level will be obtained today.  Follow-up in a years time.  She is up-to-date on her screening colonoscopies        ICD-10-CM ICD-9-CM   1. Crohn's disease of large intestine without complication (CMS/[facility])  K50.10 555.1   2. Vitamin B 12 deficiency  E53.8 266.2   3. Long term current use of immunosuppressive drug  Z79.899 V58.69     At least 30 minutes was spent before during and after this visit and the appropriate [id number] was billed   Sergey Grant M.D.  [facility]  EMR Dragon/Transcription disclaimer:   Much of this encounter note is an electronic transcription/translation of spoken language to printed text. The electronic translation of spoken language may permit erroneous, or at times, nonsensical words or phrases to be inadvertently transcribed; Although I have reviewed the note for such errors, some may still exist.

## 2021-06-30 ENCOUNTER — TELEPHONE (OUTPATIENT)
Dept: GASTROENTEROLOGY | Facility: CLINIC | Age: 46
End: 2021-06-30

## 2021-06-30 LAB
BASOPHILS # BLD AUTO: 0.06 10*3/MM3 (ref 0–0.2)
BASOPHILS NFR BLD AUTO: 0.6 % (ref 0–1.5)
DEPRECATED RDW RBC AUTO: 49.1 FL (ref 37–54)
EOSINOPHIL # BLD AUTO: 0.41 10*3/MM3 (ref 0–0.4)
EOSINOPHIL NFR BLD AUTO: 4.4 % (ref 0.3–6.2)
ERYTHROCYTE [DISTWIDTH] IN BLOOD BY AUTOMATED COUNT: 15.8 % (ref 12.3–15.4)
HCT VFR BLD AUTO: 38.3 % (ref 34–46.6)
HGB BLD-MCNC: 12 G/DL (ref 12–15.9)
IMM GRANULOCYTES # BLD AUTO: 0.03 10*3/MM3 (ref 0–0.05)
IMM GRANULOCYTES NFR BLD AUTO: 0.3 % (ref 0–0.5)
LYMPHOCYTES # BLD AUTO: 3.34 10*3/MM3 (ref 0.7–3.1)
LYMPHOCYTES NFR BLD AUTO: 35.9 % (ref 19.6–45.3)
MCH RBC QN AUTO: 26.7 PG (ref 26.6–33)
MCHC RBC AUTO-ENTMCNC: 31.3 G/DL (ref 31.5–35.7)
MCV RBC AUTO: 85.3 FL (ref 79–97)
MONOCYTES # BLD AUTO: 0.8 10*3/MM3 (ref 0.1–0.9)
MONOCYTES NFR BLD AUTO: 8.6 % (ref 5–12)
NEUTROPHILS NFR BLD AUTO: 4.67 10*3/MM3 (ref 1.7–7)
NEUTROPHILS NFR BLD AUTO: 50.2 % (ref 42.7–76)
NRBC BLD AUTO-RTO: 0.1 /100 WBC (ref 0–0.2)
PLATELET # BLD AUTO: 368 10*3/MM3 (ref 140–450)
PMV BLD AUTO: 11.2 FL (ref 6–12)
RBC # BLD AUTO: 4.49 10*6/MM3 (ref 3.77–5.28)
VIT B12 BLD-MCNC: 425 PG/ML (ref 211–946)
WBC # BLD AUTO: 9.31 10*3/MM3 (ref 3.4–10.8)

## 2021-06-30 NOTE — TELEPHONE ENCOUNTER
----- Message from Sergey Grant MD sent at 6/30/2021 10:55 AM EDT -----  Please let Debbie know her B12 level was normal and she is no longer anemic.

## 2021-07-29 ENCOUNTER — LAB (OUTPATIENT)
Dept: LAB | Facility: HOSPITAL | Age: 46
End: 2021-07-29

## 2021-07-29 ENCOUNTER — OFFICE VISIT (OUTPATIENT)
Dept: INTERNAL MEDICINE | Facility: CLINIC | Age: 46
End: 2021-07-29

## 2021-07-29 VITALS
BODY MASS INDEX: 35.86 KG/M2 | HEART RATE: 94 BPM | OXYGEN SATURATION: 98 % | WEIGHT: 202.4 LBS | TEMPERATURE: 96.9 F | HEIGHT: 63 IN | DIASTOLIC BLOOD PRESSURE: 68 MMHG | SYSTOLIC BLOOD PRESSURE: 124 MMHG

## 2021-07-29 DIAGNOSIS — R00.0 TACHYCARDIA: Primary | ICD-10-CM

## 2021-07-29 DIAGNOSIS — Z85.3 HISTORY OF BREAST CANCER: ICD-10-CM

## 2021-07-29 DIAGNOSIS — R73.09 ABNORMAL GLUCOSE: ICD-10-CM

## 2021-07-29 DIAGNOSIS — M25.551 RIGHT HIP PAIN: ICD-10-CM

## 2021-07-29 DIAGNOSIS — Z13.220 SCREENING, LIPID: ICD-10-CM

## 2021-07-29 DIAGNOSIS — R94.31 ABNORMAL EKG: ICD-10-CM

## 2021-07-29 DIAGNOSIS — H57.89 REDNESS OF EYE, RIGHT: ICD-10-CM

## 2021-07-29 DIAGNOSIS — R00.0 TACHYCARDIA: ICD-10-CM

## 2021-07-29 DIAGNOSIS — R22.2 MASS OF SUBCUTANEOUS TISSUE OF BACK: ICD-10-CM

## 2021-07-29 DIAGNOSIS — R00.2 PALPITATIONS: ICD-10-CM

## 2021-07-29 DIAGNOSIS — K50.111 CROHN'S DISEASE OF LARGE INTESTINE WITH RECTAL BLEEDING (HCC): ICD-10-CM

## 2021-07-29 LAB
ALBUMIN SERPL-MCNC: 4.3 G/DL (ref 3.5–5.2)
ALBUMIN/GLOB SERPL: 1.7 G/DL
ALP SERPL-CCNC: 113 U/L (ref 39–117)
ALT SERPL W P-5'-P-CCNC: 17 U/L (ref 1–33)
ANION GAP SERPL CALCULATED.3IONS-SCNC: 7.8 MMOL/L (ref 5–15)
AST SERPL-CCNC: 20 U/L (ref 1–32)
BASOPHILS # BLD AUTO: 0.05 10*3/MM3 (ref 0–0.2)
BASOPHILS NFR BLD AUTO: 0.7 % (ref 0–1.5)
BILIRUB SERPL-MCNC: 0.3 MG/DL (ref 0–1.2)
BUN SERPL-MCNC: 11 MG/DL (ref 6–20)
BUN/CREAT SERPL: 16.9 (ref 7–25)
CALCIUM SPEC-SCNC: 9.7 MG/DL (ref 8.6–10.5)
CHLORIDE SERPL-SCNC: 106 MMOL/L (ref 98–107)
CHOLEST SERPL-MCNC: 152 MG/DL (ref 0–200)
CO2 SERPL-SCNC: 27.2 MMOL/L (ref 22–29)
CREAT SERPL-MCNC: 0.65 MG/DL (ref 0.57–1)
DEPRECATED RDW RBC AUTO: 51.2 FL (ref 37–54)
EOSINOPHIL # BLD AUTO: 0.28 10*3/MM3 (ref 0–0.4)
EOSINOPHIL NFR BLD AUTO: 4.2 % (ref 0.3–6.2)
ERYTHROCYTE [DISTWIDTH] IN BLOOD BY AUTOMATED COUNT: 15.9 % (ref 12.3–15.4)
GFR SERPL CREATININE-BSD FRML MDRD: 98 ML/MIN/1.73
GLOBULIN UR ELPH-MCNC: 2.6 GM/DL
GLUCOSE SERPL-MCNC: 102 MG/DL (ref 65–99)
HBA1C MFR BLD: 5.72 % (ref 4.8–5.6)
HCT VFR BLD AUTO: 40.4 % (ref 34–46.6)
HDLC SERPL-MCNC: 39 MG/DL (ref 40–60)
HGB BLD-MCNC: 12.8 G/DL (ref 12–15.9)
IMM GRANULOCYTES # BLD AUTO: 0.01 10*3/MM3 (ref 0–0.05)
IMM GRANULOCYTES NFR BLD AUTO: 0.1 % (ref 0–0.5)
LDLC SERPL CALC-MCNC: 93 MG/DL (ref 0–100)
LDLC/HDLC SERPL: 2.33 {RATIO}
LYMPHOCYTES # BLD AUTO: 2.3 10*3/MM3 (ref 0.7–3.1)
LYMPHOCYTES NFR BLD AUTO: 34.5 % (ref 19.6–45.3)
MCH RBC QN AUTO: 27.7 PG (ref 26.6–33)
MCHC RBC AUTO-ENTMCNC: 31.7 G/DL (ref 31.5–35.7)
MCV RBC AUTO: 87.4 FL (ref 79–97)
MONOCYTES # BLD AUTO: 0.69 10*3/MM3 (ref 0.1–0.9)
MONOCYTES NFR BLD AUTO: 10.3 % (ref 5–12)
NEUTROPHILS NFR BLD AUTO: 3.34 10*3/MM3 (ref 1.7–7)
NEUTROPHILS NFR BLD AUTO: 50.2 % (ref 42.7–76)
NRBC BLD AUTO-RTO: 0 /100 WBC (ref 0–0.2)
PLATELET # BLD AUTO: 368 10*3/MM3 (ref 140–450)
PMV BLD AUTO: 11.1 FL (ref 6–12)
POTASSIUM SERPL-SCNC: 4.1 MMOL/L (ref 3.5–5.2)
PROT SERPL-MCNC: 6.9 G/DL (ref 6–8.5)
RBC # BLD AUTO: 4.62 10*6/MM3 (ref 3.77–5.28)
SODIUM SERPL-SCNC: 141 MMOL/L (ref 136–145)
T4 FREE SERPL-MCNC: 2.38 NG/DL (ref 0.93–1.7)
TRIGL SERPL-MCNC: 110 MG/DL (ref 0–150)
TSH SERPL DL<=0.05 MIU/L-ACNC: <0.005 UIU/ML (ref 0.27–4.2)
VLDLC SERPL-MCNC: 20 MG/DL (ref 5–40)
WBC # BLD AUTO: 6.67 10*3/MM3 (ref 3.4–10.8)

## 2021-07-29 PROCEDURE — 80053 COMPREHEN METABOLIC PANEL: CPT | Performed by: PHYSICIAN ASSISTANT

## 2021-07-29 PROCEDURE — 80061 LIPID PANEL: CPT | Performed by: PHYSICIAN ASSISTANT

## 2021-07-29 PROCEDURE — 83036 HEMOGLOBIN GLYCOSYLATED A1C: CPT | Performed by: PHYSICIAN ASSISTANT

## 2021-07-29 PROCEDURE — 84439 ASSAY OF FREE THYROXINE: CPT

## 2021-07-29 PROCEDURE — 84443 ASSAY THYROID STIM HORMONE: CPT | Performed by: PHYSICIAN ASSISTANT

## 2021-07-29 PROCEDURE — 93000 ELECTROCARDIOGRAM COMPLETE: CPT | Performed by: PHYSICIAN ASSISTANT

## 2021-07-29 PROCEDURE — 85025 COMPLETE CBC W/AUTO DIFF WBC: CPT | Performed by: PHYSICIAN ASSISTANT

## 2021-07-29 PROCEDURE — 99204 OFFICE O/P NEW MOD 45 MIN: CPT | Performed by: PHYSICIAN ASSISTANT

## 2021-07-29 NOTE — ASSESSMENT & PLAN NOTE
Adv to f/u with oncology, since she cont to get bra rx through them. Also if her left axilla pain persists. Consider u/s or discuss with oncology.

## 2021-07-29 NOTE — PROGRESS NOTES
Chief Complaint  Establish Care, Hip Pain, Rapid Heart Rate, Eye Problem (lft red eye for a month ), Mass (on the back of neck ), and Pain (arm pit )    Subjective          History of Present Illness  Debbie Clemens presents to [facility] to establish care.  Left eye redness:  Has been red for a month w/o discharge or vision change or pain. Feels FB sensation at times. Wears glasses, sees [facility] but has not had a dilated eye exam for a while.     Tachycardia:  Has noticed on her Fit Bit that her HR has been higher for the last few months, since April. Resting HR per Fit Bit is in the 90s. Occ will have palpitations but it does not correlate with tachycardia. She did have cardiac exam in 2013 prior to her having chemo and everything was fine. No CP, SOA, edema, diaphoresis. Has not had hx of HTN. Father had AMI, first one at age 50s, no fhx of stroke. HR has been up to 120 at rest. That is the highest it gets, does not exercise often.    Crohns dz:  Followed by Dr Grant for 30 years, started Entyvio in 2018, doing well with that.     Right Hip Pain:  Has not had xrays or arthritis in the past. Had Dexa scan a few years ago due to being on steroids for years with crohns but it was ok.  Does have popping sometimes of her hip. Sometimes the pain radiates into her leg. Has mild lower back pain at times that improves with change in position.     Axilla Pain:  Has had some tenderness in her left arm pit off and on for a month. Her breast CA was stage III and it was dx due to lump in her arm pit. She does not have f/u sched with oncology.  Was seeing Dr Yuen for oncology. Had double mastectomy in Jan 2014. Was planning on reconstruction at some point.     Mass in neck:  Has not had u/s, has had it for a while, years, and was told it was a lipoma. It has gotten a little bigger and she was told to watch it.     Hx Breast CA. Had total hysterectomy due to prolonged periods and cancer  hx.       Review of Systems   Constitutional: Negative for fever and unexpected weight loss.   Eyes: Positive for redness. Negative for pain, discharge, itching and visual disturbance.   Respiratory: Negative for cough, shortness of breath and wheezing.    Cardiovascular: Positive for palpitations. Negative for chest pain.   Musculoskeletal: Positive for arthralgias. Negative for gait problem, joint swelling and myalgias.       The following portions of the patient's history were reviewed and updated as appropriate: allergies, current medications, past family history, past medical history, past social history, past surgical history and problem list.    Allergies   Allergen Reactions   • Lialda [Mesalamine] GI Intolerance     Worse symptoms of crohns   • Nuts Swelling     TREE NUTS, makes tongue swell   • Prilosec [Omeprazole] Diarrhea   • Tree Nut Swelling   • Epinephrine Nausea Only     Jittery and nausea     Current Outpatient Medications on File Prior to Visit   Medication Sig Dispense Refill   • Cyanocobalamin (B-12 Compliance Injection) 1000 MCG/ML kit Inject 1,000 mcg as directed Every 30 (Thirty) Days. 1 kit 12   • loratadine (CLARITIN) 10 MG tablet Take 10 mg by mouth Daily.     • multivitamin with minerals (MULTIVITAMIN ADULT PO) Take 1 tablet by mouth Daily.     • Vedolizumab (ENTYVIO IV) Infuse  into a venous catheter. Every 8 weeks-  2 weeks after surgery will have       No current facility-administered medications on file prior to visit.     No orders of the defined types were placed in this encounter.      Past Medical History:   Diagnosis Date   • Breast cancer (CMS/HCC)    • Cancer (CMS/HCC)     breast cancer- left    • Crohn's disease (CMS/HCC)    • Heartburn     tums prn    • Slow to wake up after anesthesia     for colonoscopy - d and c was fine    • Wears glasses       Past Surgical History:   Procedure Laterality Date   •  SECTION     • COLONOSCOPY  2019   • D & C HYSTEROSCOPY N/A  "3/17/2021    Procedure: HYSTEROSCOPY DILATION & CURRETAGE;  Surgeon: Liv Lorenzana MD;  Location:  [facility];  Service: Obstetrics/Gynecology;  Laterality: N/A;   • MASTECTOMY Bilateral    • ROOT CANAL  2017    Dr Teague    • TOTAL LAPAROSCOPIC HYSTERECTOMY SALPINGO OOPHORECTOMY N/A 2021    Procedure: ROBOTIC ASSISTED HYSTERECTOMY WITH BILATERAL SALPINGO-OOPHORECTOMY AND CYSTOSCOPY;  Surgeon: Liv Lorenzana MD;  Location:  [facility];  Service: DaVinci;  Laterality: N/A;      Family History   Problem Relation Age of Onset   • Hypertension Other    • Heart disease Other    • Hyperlipidemia Other    • Ovarian cancer Neg Hx    • Breast cancer Neg Hx       Social History     Socioeconomic History   • Marital status:      Spouse name: Not on file   • Number of children: Not on file   • Years of education: Not on file   • Highest education level: Not on file   Tobacco Use   • Smoking status: Former Smoker     Packs/day: 0.75     Years: 15.00     Pack years: 11.25     Types: Cigarettes     Quit date:      Years since quittin.6   • Smokeless tobacco: Never Used   Vaping Use   • Vaping Use: Never used   Substance and Sexual Activity   • Alcohol use: Yes     Comment: couple glasses a wine a week   • Drug use: No   • Sexual activity: Defer        Objective   Vital Signs:   Vitals:    21 0807   BP: 124/68   Pulse: 94   Temp: 96.9 °F (36.1 °C)   TempSrc: Temporal   SpO2: 98%   Weight: 91.8 kg (202 lb 6.4 oz)   Height: 160 cm (63\")      Body mass index is 35.85 kg/m².  Physical Exam  Vitals reviewed.   Constitutional:       General: She is not in acute distress.     Appearance: Normal appearance.   HENT:      Head: Normocephalic and atraumatic.   Eyes:      General: No scleral icterus.     Extraocular Movements: Extraocular movements intact.      Conjunctiva/sclera: Conjunctivae normal.   Cardiovascular:      Rate and Rhythm: Normal rate and regular rhythm.      Heart sounds: " Normal heart sounds. No murmur heard.     Pulmonary:      Effort: Pulmonary effort is normal. No respiratory distress.      Breath sounds: Normal breath sounds. No stridor. No wheezing or rhonchi.   Musculoskeletal:      Cervical back: Normal range of motion and neck supple.   Skin:     General: Skin is warm and dry.      Coloration: Skin is not jaundiced.      Comments: 3cm mobile mass on upper right back consistent with lipoma.    Neurological:      General: No focal deficit present.      Mental Status: She is alert and oriented to person, place, and time.      Gait: Gait normal.   Psychiatric:         Mood and Affect: Mood normal.         Behavior: Behavior normal.          Result Review :            ECG 12 Lead    Date/Time: 7/29/2021 9:12 AM  Performed by: Krista Don PA-C  Authorized by: Krista Don PA-C   Comparison: not compared with previous ECG   Rhythm: sinus rhythm  Rate: normal  BPM: 92  Conduction: conduction normal  QRS axis: normal  Other findings: left ventricular hypertrophy    Clinical impression: abnormal EKG                Assessment and Plan    Diagnoses and all orders for this visit:    1. Tachycardia (Primary)  Assessment & Plan:  Refer to cardio d/t unknown cause and abn EKG. Checking labs today    Orders:  -     Comprehensive Metabolic Panel; Future  -     CBC Auto Differential; Future  -     TSH Rfx On Abnormal To Free T4; Future  -     ECG 12 Lead  -     Ambulatory Referral to Cardiology    2. Right hip pain  Assessment & Plan:  Check hip xray, ibu prn    Orders:  -     XR Hip With or Without Pelvis 2 - 3 View Right; Future    3. Crohn's disease of large intestine with rectal bleeding (CMS/HCC)  Assessment & Plan:  Chronic, stable, f/u with Dr Grant GI as dir      4. Redness of eye, right  Assessment & Plan:  Refer to ophthalmology for eval    Orders:  -     Ambulatory Referral to Ophthalmology    5. Mass of subcutaneous tissue of back  Assessment & Plan:  Likely lipoma but will  order xray due to pt concern    Orders:  -     US Head Neck Soft Tissue; Future    6. Abnormal EKG  -     Ambulatory Referral to Cardiology    7. Palpitations  -     Ambulatory Referral to Cardiology    8. History of breast cancer  Assessment & Plan:  Adv to f/u with oncology, since she cont to get bra rx through them. Also if her left axilla pain persists. Consider u/s or discuss with oncology.      9. Abnormal glucose  -     Hemoglobin A1c; Future    10. Screening, lipid  -     Lipid Panel; Future        Follow Up   Return in about 3 months (around 10/29/2021).    Follow up if symptoms worsen or persist or has new or concerning symptoms, go to ER for severe symptoms.   Reviewed common medication effects and side effects and to report side effects immediately, the patient expressed good understanding.  Encouraged medication compliance and the importance of keeping scheduled follow up appointments with me and any other providers  If labs or images are ordered we will contact you with the results within the next week.  If you have not heard from us after a week please call our office to inquire about the results.   Patient was given instructions and counseling regarding her condition or for health maintenance advice. Please see specific information pulled into the AVS if appropriate.     Krista Don PA-C    * Please note that portions of this note may have been completed with a voice recognition program. Efforts were made to edit the dictation but occasionally words are erroneously transcribed.

## 2021-07-30 ENCOUNTER — TELEPHONE (OUTPATIENT)
Dept: INTERNAL MEDICINE | Facility: CLINIC | Age: 46
End: 2021-07-30

## 2021-07-30 DIAGNOSIS — E05.90 HYPERTHYROIDISM: Primary | ICD-10-CM

## 2021-07-30 RX ORDER — ATENOLOL 25 MG/1
25 TABLET ORAL DAILY
Qty: 30 TABLET | Refills: 1 | Status: SHIPPED | OUTPATIENT
Start: 2021-07-30 | End: 2021-09-03 | Stop reason: DRUGHIGH

## 2021-07-30 NOTE — TELEPHONE ENCOUNTER
Pn pt expressed understanding       Krista Don, EVANGELINA Rice  Clinical Pool  Your labs showed hyperthyroidism, this is likely the cause of your recent tachycardia. Your glucose was slightly elevated and you are right at the start of prediabetes, your cholesterol was good. Labs otherwise within acceptable ranges.   I am going to refer you to endo for work up on hyperthyroid and I will start you on a beta blocker heart medication to help keep your heart rate under control.

## 2021-08-11 ENCOUNTER — HOSPITAL ENCOUNTER (OUTPATIENT)
Dept: ULTRASOUND IMAGING | Facility: HOSPITAL | Age: 46
Discharge: HOME | End: 2021-08-11
Admitting: PHYSICIAN ASSISTANT

## 2021-08-11 DIAGNOSIS — R22.2 MASS OF SUBCUTANEOUS TISSUE OF BACK: ICD-10-CM

## 2021-08-11 PROCEDURE — 76536 US EXAM OF HEAD AND NECK: CPT

## 2021-08-20 ENCOUNTER — TELEPHONE (OUTPATIENT)
Dept: INTERNAL MEDICINE | Facility: CLINIC | Age: 46
End: 2021-08-20

## 2021-08-20 NOTE — TELEPHONE ENCOUNTER
----- Message from Krista Don PA-C sent at 8/19/2021 10:19 PM EDT -----  Ultrasound showed a likely lipoma.  It is bothersome or continues getting larger we can refer her to surgery to get it removed.

## 2021-09-02 NOTE — PROGRESS NOTES
OFFICE VISIT  NOTE  [facility] CARDIOLOGY      Name: Debbie Clemens    Date: 9/3/2021  MRN:  [id number]  :  1975      REFERRING/PRIMARY PROVIDER:  Krista Don PA-C    Chief Complaint   Patient presents with   • Rapid Heart Rate     Consult       HPI: Debbie Clemens is a 46 y.o. female who presents today for new consultation for tachycardia.  Infusion nurse told her she had an elevated heart rate in May 2021.  She wears a fit bit and noticed her heart rate was staying around 100.  PCP started atenolol 25 mg daily 2021.  Since then she feels more dizzy and more fatigued.  Denies palpitations even prior to atenolol.  Recent thyroid studies show hypothyroidism, she has endocrinology consultation     Past Medical History:   Diagnosis Date   • Abnormal EKG    • Breast cancer (CMS/HCC)    • Cancer (CMS/HCC)     breast cancer- left    • Crohn's disease (CMS/HCC)    • Heartburn     tums prn    • Hypothyroidism    • Slow to wake up after anesthesia     for colonoscopy - d and c was fine    • Wears glasses        Past Surgical History:   Procedure Laterality Date   •  SECTION     • COLONOSCOPY  2019   • D & C HYSTEROSCOPY N/A 3/17/2021    Procedure: HYSTEROSCOPY DILATION & CURRETAGE;  Surgeon: Liv Lorenzana MD;  Location: [facility];  Service: Obstetrics/Gynecology;  Laterality: N/A;   • MASTECTOMY Bilateral    • ROOT CANAL  2017    Dr Teague    • TOTAL LAPAROSCOPIC HYSTERECTOMY SALPINGO OOPHORECTOMY N/A 2021    Procedure: ROBOTIC ASSISTED HYSTERECTOMY WITH BILATERAL SALPINGO-OOPHORECTOMY AND CYSTOSCOPY;  Surgeon: Liv Lorenzana MD;  Location:  [facility];  Service: [facility];  Laterality: N/A;       Social History     Socioeconomic History   • Marital status:      Spouse name: Not on file   • Number of children: Not on file   • Years of education: Not on file   • Highest education level: Not on file   Tobacco Use   • Smoking  status: Former Smoker     Packs/day: 0.75     Years: 10.00     Pack years: 7.50     Types: Cigarettes     Start date: 2000     Quit date:      Years since quittin.6   • Smokeless tobacco: Never Used   Vaping Use   • Vaping Use: Never used   Substance and Sexual Activity   • Alcohol use: Yes     Alcohol/week: 4.0 standard drinks     Types: 2 Glasses of wine, 2 Cans of beer per week     Comment: couple glasses a wine a week   • Drug use: No   • Sexual activity: Not Currently     Partners: Female     Birth control/protection: None       Family History   Problem Relation Age of Onset   • Hypertension Other    • Heart disease Other    • Hyperlipidemia Other    • Heart attack Father         3 heart attacks   • Hypertension Mother         High blood pressure   • Ovarian cancer Neg Hx    • Breast cancer Neg Hx         ROS:   Constitutional no fever,  no weight loss   Skin no rash, no subcutaneous nodules   Otolaryngeal no difficulty swallowing   Cardiovascular See HPI   Pulmonary no cough, no sputum production   Gastrointestinal no constipation, no diarrhea   Genitourinary no dysuria, no hematuria   Hematologic no easy bruisability, no abnormal bleeding   Musculoskeletal no muscle pain   Neurologic no dizziness, no falls         Allergies   Allergen Reactions   • Lialda [Mesalamine] GI Intolerance     Worse symptoms of crohns   • Nuts Swelling     TREE NUTS, makes tongue swell   • Prilosec [Omeprazole] Diarrhea   • Epinephrine Nausea Only     Jittery and nausea         Current Outpatient Medications:   •  atenolol (Tenormin) 25 MG tablet, Take 1 tablet by mouth Daily., Disp: 30 tablet, Rfl: 1  •  Cyanocobalamin (B-12 Compliance Injection) 1000 MCG/ML kit, Inject 1,000 mcg as directed Every 30 (Thirty) Days., Disp: 1 kit, Rfl: 12  •  multivitamin with minerals (MULTIVITAMIN ADULT PO), Take 1 tablet by mouth Daily., Disp: , Rfl:   •  Vedolizumab (ENTYVIO IV), Infuse  into a venous catheter. Every 8 weeks-  2  "weeks after surgery will have, Disp: , Rfl:     Vitals:    09/03/21 1437   BP: 96/66   BP Location: Right arm   Patient Position: Sitting   Pulse: 80   SpO2: 98%   Weight: 91.6 kg (202 lb)   Height: 162.6 cm (64\")     Body mass index is 34.67 kg/m².    PHYSICAL EXAM:    General Appearance:   · well developed  · well nourished  HENT:   · oropharynx moist  · lips not cyanotic  Neck:  · thyroid not enlarged  · supple  Respiratory:  · no respiratory distress  · normal breath sounds  · no rales  Cardiovascular:  · no jugular venous distention  · regular rhythm  · apical impulse normal  · S1 normal, S2 normal  · no S3, no S4   · no murmur  · no rub, no thrill  · carotid pulses normal; no bruit  · pedal pulses normal  · lower extremity edema: none    Gastrointestinal:   · bowel sounds normal  · non-tender  · no hepatomegaly, no splenomegaly  Musculoskeletal:  · no clubbing of fingers.   · normocephalic, head atraumatic  Skin:   · warm, dry  Psychiatric:  · judgement and insight appropriate  · normal mood and affect    RESULTS:     ECG 12 Lead    Date/Time: 9/3/2021 2:49 PM  Performed by: Tony Childress MD  Authorized by: Tony Childress MD   Comparison: compared with previous ECG from 7/30/2021  Similar to previous ECG  Rhythm: sinus rhythm  Rate: normal  QRS axis: normal    Clinical impression: non-specific ECG                  Labs:  Lab Results   Component Value Date    CHOL 152 07/29/2021    TRIG 110 07/29/2021    HDL 39 (L) 07/29/2021    LDL 93 07/29/2021    AST 20 07/29/2021    ALT 17 07/29/2021     Lab Results   Component Value Date    HGBA1C 5.72 (H) 07/29/2021     No components found for: CREATINININE  eGFR Non  Amer   Date Value Ref Range Status   07/29/2021 98 >60 mL/min/1.73 Final   05/03/2021 99 >60 mL/min/1.73 Final   03/15/2021 110 >60 mL/min/1.73 Final       Most recent PCP note, imaging tests, and labs reviewed.    ASSESSMENT:  Problem List Items Addressed This Visit        Cardiac and " Vasculature    Tachycardia - Primary    Abnormal EKG    Palpitations       Endocrine and Metabolic    Hyperthyroidism          PLAN:    1.  Tachycardia:  Likely related to hyperthyroidism  Okay to decrease atenolol to 12.5 mg daily due to dizziness and fatigue  Increase water intake    2.  LVH on EKG at PCP:  Check echocardiogram    3.  Hyperthyroidism:  Follow-up with endocrinology for further treatment assessment.      Return to clinic in 6 months, or sooner as needed.    Thank you for the opportunity to share in the care of your patient; please do not hesitate to call me with any questions.     Tony Childress MD, [facility]  Office: [phone] [address] [place] [address]    09/03/21

## 2021-09-03 ENCOUNTER — OFFICE VISIT (OUTPATIENT)
Dept: CARDIOLOGY | Facility: CLINIC | Age: 46
End: 2021-09-03

## 2021-09-03 VITALS
OXYGEN SATURATION: 98 % | BODY MASS INDEX: 34.49 KG/M2 | SYSTOLIC BLOOD PRESSURE: 96 MMHG | WEIGHT: 202 LBS | HEART RATE: 80 BPM | DIASTOLIC BLOOD PRESSURE: 66 MMHG | HEIGHT: 64 IN

## 2021-09-03 DIAGNOSIS — R00.0 TACHYCARDIA: Primary | ICD-10-CM

## 2021-09-03 DIAGNOSIS — E05.90 HYPERTHYROIDISM: ICD-10-CM

## 2021-09-03 DIAGNOSIS — R94.31 ABNORMAL EKG: ICD-10-CM

## 2021-09-03 DIAGNOSIS — R00.2 PALPITATIONS: ICD-10-CM

## 2021-09-03 DIAGNOSIS — R06.02 SOB (SHORTNESS OF BREATH): ICD-10-CM

## 2021-09-03 PROCEDURE — 99204 OFFICE O/P NEW MOD 45 MIN: CPT | Performed by: INTERNAL MEDICINE

## 2021-09-03 PROCEDURE — 93000 ELECTROCARDIOGRAM COMPLETE: CPT | Performed by: INTERNAL MEDICINE

## 2021-09-03 RX ORDER — ATENOLOL 25 MG/1
12.5 TABLET ORAL DAILY
Qty: 45 TABLET | Refills: 3 | Status: SHIPPED | OUTPATIENT
Start: 2021-09-03 | End: 2022-04-05

## 2021-09-08 ENCOUNTER — TELEPHONE (OUTPATIENT)
Dept: INTERNAL MEDICINE | Facility: CLINIC | Age: 46
End: 2021-09-08

## 2021-09-08 DIAGNOSIS — E05.90 HYPERTHYROIDISM: Primary | ICD-10-CM

## 2021-09-08 NOTE — TELEPHONE ENCOUNTER
Please ask pt to come in for f/u. I saw that her endo appt was still 2 months out and would like to repeat some labs on our end and see how she is doing. I will also schedule a thyroid ultrasound.

## 2021-09-09 ENCOUNTER — TELEPHONE (OUTPATIENT)
Dept: GASTROENTEROLOGY | Facility: CLINIC | Age: 46
End: 2021-09-09

## 2021-09-09 NOTE — TELEPHONE ENCOUNTER
[facility] CALLED AND STATED THAT [facility]IO [facility] PA HAS  TO GO THROUGH [facility] INSTEAD OF [facility]. I WILL SUBMIT PA REQUEST FOR ENTYVIO.

## 2021-09-13 ENCOUNTER — OFFICE VISIT (OUTPATIENT)
Dept: INTERNAL MEDICINE | Facility: CLINIC | Age: 46
End: 2021-09-13

## 2021-09-13 ENCOUNTER — LAB (OUTPATIENT)
Dept: LAB | Facility: HOSPITAL | Age: 46
End: 2021-09-13

## 2021-09-13 VITALS
TEMPERATURE: 97.8 F | SYSTOLIC BLOOD PRESSURE: 122 MMHG | RESPIRATION RATE: 20 BRPM | WEIGHT: 202 LBS | DIASTOLIC BLOOD PRESSURE: 74 MMHG | HEIGHT: 64 IN | BODY MASS INDEX: 34.49 KG/M2 | OXYGEN SATURATION: 98 % | HEART RATE: 89 BPM

## 2021-09-13 DIAGNOSIS — F41.9 ANXIETY: ICD-10-CM

## 2021-09-13 DIAGNOSIS — J30.9 ALLERGIC RHINITIS, UNSPECIFIED SEASONALITY, UNSPECIFIED TRIGGER: ICD-10-CM

## 2021-09-13 DIAGNOSIS — R42 DIZZINESS: ICD-10-CM

## 2021-09-13 DIAGNOSIS — E05.90 HYPERTHYROIDISM: Primary | ICD-10-CM

## 2021-09-13 DIAGNOSIS — E05.90 HYPERTHYROIDISM: ICD-10-CM

## 2021-09-13 PROCEDURE — 99214 OFFICE O/P EST MOD 30 MIN: CPT | Performed by: PHYSICIAN ASSISTANT

## 2021-09-13 PROCEDURE — 84480 ASSAY TRIIODOTHYRONINE (T3): CPT | Performed by: PHYSICIAN ASSISTANT

## 2021-09-13 PROCEDURE — 86376 MICROSOMAL ANTIBODY EACH: CPT | Performed by: PHYSICIAN ASSISTANT

## 2021-09-13 PROCEDURE — 84439 ASSAY OF FREE THYROXINE: CPT | Performed by: PHYSICIAN ASSISTANT

## 2021-09-13 PROCEDURE — 84445 ASSAY OF TSI GLOBULIN: CPT | Performed by: PHYSICIAN ASSISTANT

## 2021-09-13 PROCEDURE — 84443 ASSAY THYROID STIM HORMONE: CPT | Performed by: PHYSICIAN ASSISTANT

## 2021-09-13 RX ORDER — DICLOFENAC SODIUM 1 MG/ML
SOLUTION/ DROPS OPHTHALMIC
COMMUNITY
Start: 2021-09-02 | End: 2021-11-02

## 2021-09-13 RX ORDER — CYANOCOBALAMIN 1000 UG/ML
INJECTION, SOLUTION INTRAMUSCULAR; SUBCUTANEOUS
COMMUNITY
Start: 2021-09-10 | End: 2021-09-13 | Stop reason: SDUPTHER

## 2021-09-13 RX ORDER — NEOMYCIN SULFATE, POLYMYXIN B SULFATE AND DEXAMETHASONE 3.5; 10000; 1 MG/ML; [USP'U]/ML; MG/ML
SUSPENSION/ DROPS OPHTHALMIC
COMMUNITY
Start: 2021-09-02 | End: 2021-11-02

## 2021-09-13 RX ORDER — FLUTICASONE PROPIONATE 50 MCG
2 SPRAY, SUSPENSION (ML) NASAL DAILY
Qty: 16 G | Refills: 2 | Status: SHIPPED | OUTPATIENT
Start: 2021-09-13

## 2021-09-13 RX ORDER — HYDROXYZINE HYDROCHLORIDE 25 MG/1
25 TABLET, FILM COATED ORAL 3 TIMES DAILY PRN
Qty: 60 TABLET | Refills: 0 | Status: SHIPPED | OUTPATIENT
Start: 2021-09-13 | End: 2022-08-26

## 2021-09-13 NOTE — PROGRESS NOTES
Chief Complaint  Hyperthyroidism, Dizziness, and Anxiety    Subjective          History of Present Illness  Debbie Clemens presents to [facility] for   Hyperthyroid:  Taking atenolol 1/2 tab as dir by cardio. This has improved her dizziness some and is still helping her HR. She has appt with endo in Nov. Here for repeat labs.    Dizziness:  Has been having dizziness since starting atenolol. Feels like vertigo with the room spinnging. No hearing loss. Does have allergies and is not sure if that is contributing. Has not been taking flonase but does take claritin sometimes. No vomiting or nausea with it. No headaches, no passing out.    IrritabilityAnxiety:  Has been having increased irritability and feeling overwhelmed for the last few months ever since hysterectomy. She feels like it is getting worse. Interested in xanax for prn use for anxiety. No hi/si. No thoughts of sadness or depressive sx. Is not getting hot flashes or having many other menopausal sx.      Review of Systems   Constitutional: Negative for fever and unexpected weight loss.   Eyes: Positive for redness. Negative for pain, discharge, itching and visual disturbance.   Respiratory: Negative for cough, shortness of breath and wheezing.    Cardiovascular: Negative for chest pain and palpitations.   Gastrointestinal: Negative for abdominal pain, nausea and vomiting.   Neurological: Positive for dizziness. Negative for headache and confusion.   Psychiatric/Behavioral: Positive for stress. Negative for depressed mood. The patient is nervous/anxious.        The following portions of the patient's history were reviewed and updated as appropriate: allergies, current medications, past family history, past medical history, past social history, past surgical history and problem list.  Allergies   Allergen Reactions   • Lialda [Mesalamine] GI Intolerance     Worse symptoms of crohns   • Nuts Swelling     TREE NUTS, makes tongue  "swell   • Prilosec [Omeprazole] Diarrhea   • Epinephrine Nausea Only     Jittery and nausea     Current Outpatient Medications on File Prior to Visit   Medication Sig Dispense Refill   • atenolol (TENORMIN) 25 MG tablet Take 0.5 tablets by mouth Daily. 45 tablet 3   • Cyanocobalamin (B-12 Compliance Injection) 1000 MCG/ML kit Inject 1,000 mcg as directed Every 30 (Thirty) Days. 1 kit 12   • diclofenac (VOLTAREN) 0.1 % ophthalmic solution APPLY 1 DROP INTO THE LEFT EYE TWICE DAILY UNTIL NEXT APPOINTMENT     • multivitamin with minerals (MULTIVITAMIN ADULT PO) Take 1 tablet by mouth Daily.     • neomycin-polymyxin-dexamethasone (MAXITROL) 3.5-79304-2.1 ophthalmic suspension APPLY 1 DROP INTO THE LEFT EYE EVERY 12 HOURS UNTIL NEXT APPOINTMENT     • Vedolizumab (ENTYVIO IV) Infuse  into a venous catheter. Every 8 weeks-  2 weeks after surgery will have     • [DISCONTINUED] cyanocobalamin 1000 MCG/ML injection        No current facility-administered medications on file prior to visit.       Social History     Tobacco Use   Smoking Status Former Smoker   • Packs/day: 0.75   • Years: 10.00   • Pack years: 7.50   • Types: Cigarettes   • Start date: 2000   • Quit date:    • Years since quittin.7   Smokeless Tobacco Never Used        Objective   Vital Signs:   Vitals:    21 0928   BP: 122/74   Pulse: 89   Resp: 20   Temp: 97.8 °F (36.6 °C)   TempSrc: Temporal   SpO2: 98%   Weight: 91.6 kg (202 lb)   Height: 162.6 cm (64\")   PainSc: 0-No pain      Physical Exam  Vitals reviewed.   Constitutional:       General: She is not in acute distress.     Appearance: Normal appearance.   HENT:      Head: Normocephalic and atraumatic.   Eyes:      General: No scleral icterus.     Extraocular Movements: Extraocular movements intact.      Conjunctiva/sclera: Conjunctivae normal.   Cardiovascular:      Rate and Rhythm: Normal rate and regular rhythm.      Heart sounds: Normal heart sounds. No murmur heard.     Pulmonary: "      Effort: Pulmonary effort is normal. No respiratory distress.      Breath sounds: Normal breath sounds. No stridor. No wheezing or rhonchi.   Musculoskeletal:      Cervical back: Normal range of motion and neck supple.   Skin:     General: Skin is warm and dry.      Coloration: Skin is not jaundiced.   Neurological:      General: No focal deficit present.      Mental Status: She is alert and oriented to person, place, and time.      Gait: Gait normal.   Psychiatric:         Mood and Affect: Mood normal.         Behavior: Behavior normal.          Result Review :                New Medications Ordered This Visit   Medications   • fluticasone (Flonase) 50 MCG/ACT nasal spray     Si sprays into the nostril(s) as directed by provider Daily.     Dispense:  16 g     Refill:  2   • hydrOXYzine (ATARAX) 25 MG tablet     Sig: Take 1 tablet by mouth 3 (Three) Times a Day As Needed for Anxiety.     Dispense:  60 tablet     Refill:  0          Assessment and Plan {CC Problem List  Visit Diagnosis  ROS  Review (Popup)  Health Maintenance  Quality  BestPractice  Medications  SmartSets  SnapShot Encounters  Media :23}   Diagnoses and all orders for this visit:    1. Hyperthyroidism (Primary)  Assessment & Plan:  Cont atenolol 1/2 tab daily as dir by cardio, will check labs today. Thyroid u/s ordered    Orders:  -     TSH; Future  -     Cancel: T4; Future  -     Thyroid Peroxidase Antibody; Future  -     Thyroid Stimulating Immunoglobulin; Future  -     T4, free; Future  -     T3; Future    2. Anxiety  Assessment & Plan:  New, worsening. Vistaril 1/2 tab to 1 tid prn. F/u if this is not helpful. Consider starting SSRI for anx.    Orders:  -     hydrOXYzine (ATARAX) 25 MG tablet; Take 1 tablet by mouth 3 (Three) Times a Day As Needed for Anxiety.  Dispense: 60 tablet; Refill: 0    3. Dizziness  Assessment & Plan:  Likely related to atenolol since sx improved with dec dose, may have allergy component. Adv to  restart allergy meds to see if this helps      4. Allergic rhinitis, unspecified seasonality, unspecified trigger  -     fluticasone (Flonase) 50 MCG/ACT nasal spray; 2 sprays into the nostril(s) as directed by provider Daily.  Dispense: 16 g; Refill: 2  -     hydrOXYzine (ATARAX) 25 MG tablet; Take 1 tablet by mouth 3 (Three) Times a Day As Needed for Anxiety.  Dispense: 60 tablet; Refill: 0      Follow Up   Return in about 3 months (around 12/13/2021).    Follow up if symptoms worsen or persist or has new or concerning symptoms, go to ER for severe symptoms.   Reviewed common medication effects and side effects and advised to report side effects immediately, the patient expressed good understanding.  Encouraged medication compliance and the importance of keeping scheduled follow up appointments with me and any other providers  If labs or images are ordered we will contact you with the results within the next week.  If you have not heard from us after a week please call our office to inquire about the results.   Patient was given instructions and counseling regarding her condition or for health maintenance advice. Please see specific information pulled into the AVS if appropriate.     Krista Don PA-C    * Please note that portions of this note may have been completed with a voice recognition program. Efforts were made to edit the dictation but occasionally words are erroneously transcribed.

## 2021-09-13 NOTE — ASSESSMENT & PLAN NOTE
New, worsening. Vistaril 1/2 tab to 1 tid prn. F/u if this is not helpful. Consider starting SSRI for anx.

## 2021-09-13 NOTE — ASSESSMENT & PLAN NOTE
Likely related to atenolol since sx improved with dec dose, may have allergy component. Adv to restart allergy meds to see if this helps

## 2021-09-14 LAB
T3 SERPL-MCNC: 238 NG/DL (ref 80–200)
T4 FREE SERPL-MCNC: 2.19 NG/DL (ref 0.93–1.7)
THYROPEROXIDASE AB SERPL-ACNC: 42 IU/ML (ref 0–34)
TSH SERPL DL<=0.05 MIU/L-ACNC: <0.005 UIU/ML (ref 0.27–4.2)

## 2021-09-15 LAB — TSI SER-ACNC: 2.01 IU/L (ref 0–0.55)

## 2021-09-20 ENCOUNTER — HOSPITAL ENCOUNTER (OUTPATIENT)
Dept: ULTRASOUND IMAGING | Facility: HOSPITAL | Age: 46
Discharge: HOME | End: 2021-09-20
Admitting: PHYSICIAN ASSISTANT

## 2021-09-20 DIAGNOSIS — E05.90 HYPERTHYROIDISM: ICD-10-CM

## 2021-09-20 PROCEDURE — 76536 US EXAM OF HEAD AND NECK: CPT

## 2021-09-21 ENCOUNTER — TELEPHONE (OUTPATIENT)
Dept: INTERNAL MEDICINE | Facility: CLINIC | Age: 46
End: 2021-09-21

## 2021-09-21 NOTE — TELEPHONE ENCOUNTER
----- Message from Krista Don PA-C sent at 9/21/2021  1:53 PM EDT -----  Labs still show hyperthyroid, no change. Keep appt with Endo. We do not have results of your ultrasound yet.

## 2021-09-22 ENCOUNTER — TELEPHONE (OUTPATIENT)
Dept: INTERNAL MEDICINE | Facility: CLINIC | Age: 46
End: 2021-09-22

## 2021-09-22 NOTE — TELEPHONE ENCOUNTER
----- Message from Krista Don PA-C sent at 9/22/2021 10:07 AM EDT -----  Thyroid ultrasound looked ok, showed small nodules but none were concerning. They suggested repeating in a year.

## 2021-10-05 ENCOUNTER — HOSPITAL ENCOUNTER (OUTPATIENT)
Dept: CARDIOLOGY | Facility: HOSPITAL | Age: 46
Discharge: HOME | End: 2021-10-05
Admitting: INTERNAL MEDICINE

## 2021-10-05 VITALS — BODY MASS INDEX: 34.49 KG/M2 | WEIGHT: 202 LBS | HEIGHT: 64 IN

## 2021-10-05 DIAGNOSIS — R06.02 SOB (SHORTNESS OF BREATH): ICD-10-CM

## 2021-10-05 DIAGNOSIS — R00.0 TACHYCARDIA: ICD-10-CM

## 2021-10-05 DIAGNOSIS — R00.2 PALPITATIONS: ICD-10-CM

## 2021-10-05 DIAGNOSIS — R94.31 ABNORMAL EKG: ICD-10-CM

## 2021-10-05 PROCEDURE — 93306 TTE W/DOPPLER COMPLETE: CPT | Performed by: INTERNAL MEDICINE

## 2021-10-05 PROCEDURE — 93306 TTE W/DOPPLER COMPLETE: CPT

## 2021-10-07 LAB
BH CV ECHO MEAS - ASC AORTA: 3.3 CM
BH CV ECHO MEAS - BSA(HAYCOCK): 2.1 M^2
BH CV ECHO MEAS - BSA: 2 M^2
BH CV ECHO MEAS - BZI_BMI: 34.7 KILOGRAMS/M^2
BH CV ECHO MEAS - BZI_METRIC_HEIGHT: 162.6 CM
BH CV ECHO MEAS - BZI_METRIC_WEIGHT: 91.6 KG
BH CV ECHO MEAS - EDV(CUBED): 84.3 ML
BH CV ECHO MEAS - EDV(TEICH): 87 ML
BH CV ECHO MEAS - EF(CUBED): 62 %
BH CV ECHO MEAS - EF(TEICH): 53.8 %
BH CV ECHO MEAS - ESV(CUBED): 32 ML
BH CV ECHO MEAS - ESV(TEICH): 40.2 ML
BH CV ECHO MEAS - FS: 27.6 %
BH CV ECHO MEAS - IVS/LVPW: 0.89
BH CV ECHO MEAS - IVSD: 0.71 CM
BH CV ECHO MEAS - LA DIMENSION: 3.6 CM
BH CV ECHO MEAS - LV MASS(C)D: 99.4 GRAMS
BH CV ECHO MEAS - LV MASS(C)DI: 50.6 GRAMS/M^2
BH CV ECHO MEAS - LV MAX PG: 1.7 MMHG
BH CV ECHO MEAS - LV MEAN PG: 1.1 MMHG
BH CV ECHO MEAS - LV V1 MAX: 64.9 CM/SEC
BH CV ECHO MEAS - LV V1 MEAN: 49.1 CM/SEC
BH CV ECHO MEAS - LV V1 VTI: 14.7 CM
BH CV ECHO MEAS - LVIDD: 4.4 CM
BH CV ECHO MEAS - LVIDS: 3.2 CM
BH CV ECHO MEAS - LVOT AREA: 2.2 CM^2
BH CV ECHO MEAS - LVOT DIAM: 1.7 CM
BH CV ECHO MEAS - LVPWD: 0.79 CM
BH CV ECHO MEAS - MV A MAX VEL: 64.4 CM/SEC
BH CV ECHO MEAS - MV DEC SLOPE: 425.9 CM/SEC^2
BH CV ECHO MEAS - MV DEC TIME: 0.18 SEC
BH CV ECHO MEAS - MV E MAX VEL: 74.5 CM/SEC
BH CV ECHO MEAS - MV E/A: 1.2
BH CV ECHO MEAS - MV MAX PG: 3.1 MMHG
BH CV ECHO MEAS - MV MEAN PG: 1.6 MMHG
BH CV ECHO MEAS - MV V2 MAX: 87.3 CM/SEC
BH CV ECHO MEAS - MV V2 MEAN: 60 CM/SEC
BH CV ECHO MEAS - MV V2 VTI: 26.1 CM
BH CV ECHO MEAS - MVA(VTI): 1.2 CM^2
BH CV ECHO MEAS - PA ACC TIME: 0.08 SEC
BH CV ECHO MEAS - PA PR(ACCEL): 43 MMHG
BH CV ECHO MEAS - SI(CUBED): 26.6 ML/M^2
BH CV ECHO MEAS - SI(LVOT): 16.4 ML/M^2
BH CV ECHO MEAS - SI(TEICH): 23.8 ML/M^2
BH CV ECHO MEAS - SV(CUBED): 52.3 ML
BH CV ECHO MEAS - SV(LVOT): 32.3 ML
BH CV ECHO MEAS - SV(TEICH): 46.8 ML
BH CV ECHO MEAS - TAPSE (>1.6): 2.3 CM
BH CV VAS BP LEFT ARM: NORMAL MMHG
BH CV XLRA - RV BASE: 3.8 CM
BH CV XLRA - RV LENGTH: 5.8 CM
BH CV XLRA - RV MID: 3 CM
BH CV XLRA - TDI S': 15 CM/SEC
LEFT ATRIUM VOLUME INDEX: 29.7 ML/M^2
LEFT ATRIUM VOLUME: 58.3 ML
LV EF 2D ECHO EST: 55 %
MAXIMAL PREDICTED HEART RATE: 174 BPM
STRESS TARGET HR: 148 BPM

## 2021-10-08 ENCOUNTER — TELEPHONE (OUTPATIENT)
Dept: CARDIOLOGY | Facility: CLINIC | Age: 46
End: 2021-10-08

## 2021-10-11 ENCOUNTER — TELEPHONE (OUTPATIENT)
Dept: CARDIOLOGY | Facility: CLINIC | Age: 46
End: 2021-10-11

## 2021-10-11 NOTE — TELEPHONE ENCOUNTER
----- Message from Tony Childress MD sent at 10/11/2021 12:19 PM EDT -----  Please inform the patient of their test results. Thank you.

## 2021-11-02 ENCOUNTER — OFFICE VISIT (OUTPATIENT)
Dept: ENDOCRINOLOGY | Facility: CLINIC | Age: 46
End: 2021-11-02

## 2021-11-02 ENCOUNTER — LAB (OUTPATIENT)
Dept: LAB | Facility: HOSPITAL | Age: 46
End: 2021-11-02

## 2021-11-02 VITALS
WEIGHT: 202 LBS | BODY MASS INDEX: 35.79 KG/M2 | DIASTOLIC BLOOD PRESSURE: 78 MMHG | OXYGEN SATURATION: 99 % | SYSTOLIC BLOOD PRESSURE: 120 MMHG | HEIGHT: 63 IN | HEART RATE: 75 BPM

## 2021-11-02 DIAGNOSIS — E05.90 HYPERTHYROIDISM: Primary | ICD-10-CM

## 2021-11-02 DIAGNOSIS — E05.00 GRAVES' DISEASE: ICD-10-CM

## 2021-11-02 LAB
T4 FREE SERPL-MCNC: 1.52 NG/DL (ref 0.93–1.7)
TSH SERPL DL<=0.05 MIU/L-ACNC: <0.005 UIU/ML (ref 0.27–4.2)

## 2021-11-02 PROCEDURE — 84443 ASSAY THYROID STIM HORMONE: CPT

## 2021-11-02 PROCEDURE — 99214 OFFICE O/P EST MOD 30 MIN: CPT | Performed by: PHYSICIAN ASSISTANT

## 2021-11-02 PROCEDURE — 84439 ASSAY OF FREE THYROXINE: CPT

## 2021-11-02 RX ORDER — BRIMONIDINE TARTRATE 0.25 MG/ML
SOLUTION/ DROPS OPHTHALMIC EVERY 8 HOURS SCHEDULED
COMMUNITY
End: 2022-06-28

## 2021-11-02 RX ORDER — METHIMAZOLE 10 MG/1
10 TABLET ORAL DAILY
Qty: 30 TABLET | Refills: 3 | Status: SHIPPED | OUTPATIENT
Start: 2021-11-02 | End: 2022-03-08

## 2021-11-02 NOTE — PROGRESS NOTES
Office Note      Date: 2021  Patient Name: Debbie Clemens  MRN: [id number]  : 1975    Chief Complaint   Patient presents with   • Hyperthyroidism     Referred by: Krista Don PA-C    History of Present Illness:   Debbie Clemens is a 46 y.o. female who presents today for hyperthyroidism.     She had labs on 2021.  TSH was undetectable, free T4 was 2.38.  She had labs repeated on 2021.  The TSH was undetectable, free T4 was 2.19, total T3 elevated.  TPO was positive.  TSI was positive at 2.01 (ref range 0-0.55).  She had thyroid ultrasound on 2021 - US Thyroid (2021 15:56).  There were small nodules bilaterally, largest nodule on right 7 mm and largest on the left 4 mm.  No increased blood flow within nodules.  No dominant nodules.  No significant enlargement.    She reports being told that pulse was elevated by infusion nurse in 2021.  She then had hysterectomy/BSO in May.  She reports being paez since hysterectomy.  She has noted anxiety.  She has noted tachycardia.  She denies tremor.  She reports 10 pound weight loss in May/, but weight has been stable since then.  She has not noticed any changes in the size of her neck.  She denies trouble swallowing.  She reports that left eye became red in , then followed by right eye.  Eyes feel dry.  Puffiness under eyes.  She denies diplopia.  She has been treated by her ophthalmologist, Dr. Lubin.  She reports no improvement in red eyes.  Only improves temporarily if she uses Lumify OTC redness reliever.    She has Crohn's disease, diagnosed in her teens.  She has been treated with Entyvio since 2018.  She reports receiving infusions every 8 weeks.    Subjective      Review of Systems:   Review of Systems   Constitutional: Positive for diaphoresis, fatigue and unexpected weight change.   HENT: Negative.    Eyes: Positive for redness.   Respiratory: Negative.    Cardiovascular: Positive for leg swelling (mild,  intermittent).   Gastrointestinal: Negative.    Endocrine: Positive for heat intolerance.   Genitourinary: Negative.    Musculoskeletal: Positive for back pain and myalgias.   Allergic/Immunologic: Positive for food allergies and immunocompromised state.   Hematological: Negative.    Psychiatric/Behavioral:        Anxiety       Past Medical History:   Diagnosis Date   • Abnormal EKG    • Breast cancer (HCC)     left   • Crohn's disease (HCC)     age 13   • Heartburn     tums prn    • Hyperthyroidism    • Slow to wake up after anesthesia     for colonoscopy - d and c was fine    • Wears glasses      Past Surgical History:   Procedure Laterality Date   • MASTECTOMY Bilateral    • ROOT CANAL  2017    Dr Teague    • COLONOSCOPY  2019   • D & C HYSTEROSCOPY N/A 3/17/2021    Procedure: HYSTEROSCOPY DILATION & CURRETAGE;  Surgeon: Liv Lorenzana MD;  Location:  [facility];  Service: Obstetrics/Gynecology;  Laterality: N/A;   • TOTAL LAPAROSCOPIC HYSTERECTOMY SALPINGO OOPHORECTOMY N/A 2021    Procedure: ROBOTIC ASSISTED HYSTERECTOMY WITH BILATERAL SALPINGO-OOPHORECTOMY AND CYSTOSCOPY;  Surgeon: Liv Lorenzana MD;  Location:  [facility] OR;  Service: [facility];  Laterality: N/A;   •  SECTION       Social History     Socioeconomic History   • Marital status:    Tobacco Use   • Smoking status: Former Smoker     Packs/day: 0.75     Years: 10.00     Pack years: 7.50     Types: Cigarettes     Start date: 2000     Quit date: 2010     Years since quittin.8   • Smokeless tobacco: Never Used   Vaping Use   • Vaping Use: Never used   Substance and Sexual Activity   • Alcohol use: Yes     Alcohol/week: 4.0 standard drinks     Types: 2 Glasses of wine, 2 Cans of beer per week     Comment: couple glasses of wine a week   • Drug use: No   • Sexual activity: Not Currently     Partners: Female     Birth control/protection: None       The following portions of the patient's history were  "reviewed and updated as appropriate: allergies, current medications, past family history, past medical history, past social history, past surgical history and problem list.    Objective     Vitals:    11/02/21 0800   BP: 120/78   BP Location: Right arm   Patient Position: Sitting   Cuff Size: Adult   Pulse: 75   SpO2: 99%   Weight: 91.6 kg (202 lb)   Height: 160 cm (63\")   PainSc: 0-No pain     Body mass index is 35.78 kg/m².    Physical Exam  Vitals reviewed.   Constitutional:       General: She is not in acute distress.  Eyes:      Extraocular Movements: Extraocular movements intact.      Conjunctiva/sclera:      Right eye: Right conjunctiva is injected.      Left eye: Left conjunctiva is injected.      Pupils: Pupils are equal, round, and reactive to light.      Comments: Puffiness bilateral lower lids.   Neck:      Thyroid: No thyroid mass, thyromegaly or thyroid tenderness.   Cardiovascular:      Rate and Rhythm: Normal rate and regular rhythm.      Heart sounds: Normal heart sounds. No murmur heard.      Pulmonary:      Effort: Pulmonary effort is normal.      Breath sounds: Normal breath sounds.   Musculoskeletal:      Right lower leg: No edema.      Left lower leg: No edema.   Lymphadenopathy:      Cervical: No cervical adenopathy.   Neurological:      Mental Status: She is alert and oriented to person, place, and time.   Psychiatric:         Mood and Affect: Mood and affect normal.         TSH  Lab Results   Component Value Date    TSH <0.005 (L) 09/13/2021      T4  Lab Results   Component Value Date    FREET4 2.19 (H) 09/13/2021     THYROID AUTOANTIBODIES  Thyroid Peroxidase Antibody   Date Value Ref Range Status   09/13/2021 42 (H) 0 - 34 IU/mL Final     Thyroid Stimulating Immunoglobulin   Date Value Ref Range Status   09/13/2021 2.01 (H) 0.00 - 0.55 IU/L Final     CBC  Lab Results   Component Value Date    WBC 6.67 07/29/2021    RBC 4.62 07/29/2021    HGB 12.8 07/29/2021    HCT 40.4 07/29/2021    MCV " 87.4 07/29/2021    MCH 27.7 07/29/2021    MCHC 31.7 07/29/2021    RDW 15.9 (H) 07/29/2021    RDWSD 51.2 07/29/2021    MPV 11.1 07/29/2021     07/29/2021     CMP  Lab Results   Component Value Date    GLUCOSE 102 (H) 07/29/2021    BUN 11 07/29/2021    CREATININE 0.65 07/29/2021    EGFRIFNONA 98 07/29/2021    BCR 16.9 07/29/2021     07/29/2021    K 4.1 07/29/2021    CO2 27.2 07/29/2021    CALCIUM 9.7 07/29/2021    ALBUMIN 4.30 07/29/2021    BILITOT 0.3 07/29/2021    ALKPHOS 113 07/29/2021    AST 20 07/29/2021    ALT 17 07/29/2021       Current Outpatient Medications   Medication Instructions   • atenolol (TENORMIN) 12.5 mg, Oral, Daily   • Brimonidine Tartrate (Lumify) 0.025 % solution ophthalmic solution Every 8 Hours Scheduled   • cyanocobalamin 1000 MCG/ML injection INJECT 1 ML AS DIRECTED EVERY 30 DAYS   • fluticasone (Flonase) 50 MCG/ACT nasal spray 2 sprays, Nasal, Daily   • hydrOXYzine (ATARAX) 25 mg, Oral, 3 Times Daily PRN   • methIMAzole (TAPAZOLE) 10 mg, Oral, Daily   • multivitamin with minerals (MULTIVITAMIN ADULT PO) 1 tablet, Oral, Daily   • Vedolizumab (ENTYVIO IV) Intravenous, Every 8 weeks-  2 weeks after surgery will have       Assessment / Plan      Assessment & Plan:  1. Hyperthyroidism  2. Graves' disease  We discussed causes of hyperthyroidism and treatments.  She has been hyperthyroid on 2 sets of labs about 6 weeks apart and had positive TSI.  Most likely has Graves' disease.  Since it has been over a month since last labs, recommend rechecking TFTs today.  Will likely start methimazole 10 mg daily, but will review labs from today first.  Discussed potential side effects of methimazole.  Eye findings may be due to thyroid eye disease.  She will follow-up with ophthalmologist, Dr. Lubin.  Discussed option of referral to Dr. Martin Tobias if symptoms do not improve.  If she is diagnosed with thyroid eye disease, provided information on Tepezza (although this has potential to worsen  IBD, so may not be good option).  - TSH; Future  - T4, Free; Future    Will notify her of pending lab results from today.    Patient examined along with Dr. Jaden Lo.    Return in about 2 months (around 1/2/2022) for recheck with TFT. She was advised to contact the office with any interval questions or concerns.    GEN Calloway  Endocrinology  11/02/2021

## 2021-11-05 ENCOUNTER — PATIENT MESSAGE (OUTPATIENT)
Dept: ENDOCRINOLOGY | Facility: CLINIC | Age: 46
End: 2021-11-05

## 2021-11-08 DIAGNOSIS — E53.8 VITAMIN B 12 DEFICIENCY: ICD-10-CM

## 2021-11-08 RX ORDER — CYANOCOBALAMIN 1000 UG/ML
INJECTION, SOLUTION INTRAMUSCULAR; SUBCUTANEOUS
Qty: 1 ML | Refills: 5 | Status: SHIPPED | OUTPATIENT
Start: 2021-11-08 | End: 2022-04-14

## 2021-12-15 ENCOUNTER — LAB (OUTPATIENT)
Dept: LAB | Facility: HOSPITAL | Age: 46
End: 2021-12-15

## 2021-12-15 ENCOUNTER — LAB (OUTPATIENT)
Dept: ENDOCRINOLOGY | Facility: CLINIC | Age: 46
End: 2021-12-15

## 2021-12-15 DIAGNOSIS — E05.90 HYPERTHYROIDISM: Primary | ICD-10-CM

## 2021-12-15 DIAGNOSIS — E05.90 HYPERTHYROIDISM: ICD-10-CM

## 2021-12-15 PROCEDURE — 84439 ASSAY OF FREE THYROXINE: CPT | Performed by: PHYSICIAN ASSISTANT

## 2021-12-15 PROCEDURE — 84443 ASSAY THYROID STIM HORMONE: CPT | Performed by: PHYSICIAN ASSISTANT

## 2021-12-16 LAB
T4 FREE SERPL-MCNC: 2.09 NG/DL (ref 0.93–1.7)
TSH SERPL DL<=0.05 MIU/L-ACNC: <0.005 UIU/ML (ref 0.27–4.2)

## 2022-01-04 ENCOUNTER — OFFICE VISIT (OUTPATIENT)
Dept: ENDOCRINOLOGY | Facility: CLINIC | Age: 47
End: 2022-01-04

## 2022-01-04 VITALS
HEIGHT: 63 IN | SYSTOLIC BLOOD PRESSURE: 122 MMHG | BODY MASS INDEX: 37.25 KG/M2 | HEART RATE: 84 BPM | DIASTOLIC BLOOD PRESSURE: 72 MMHG | WEIGHT: 210.2 LBS

## 2022-01-04 DIAGNOSIS — H11.433 CONJUNCTIVAL HYPEREMIA, BILATERAL: ICD-10-CM

## 2022-01-04 DIAGNOSIS — E05.00 GRAVES' DISEASE: Primary | ICD-10-CM

## 2022-01-04 PROCEDURE — 99214 OFFICE O/P EST MOD 30 MIN: CPT | Performed by: PHYSICIAN ASSISTANT

## 2022-01-04 RX ORDER — DOXYCYCLINE 100 MG/1
100 CAPSULE ORAL 2 TIMES DAILY WITH MEALS
COMMUNITY
Start: 2021-11-30 | End: 2022-04-05

## 2022-01-04 NOTE — PROGRESS NOTES
Office Note      Date: 2022  Patient Name: Debbie Clemens  MRN: [id number]  : 1975    Chief Complaint   Patient presents with   • Hyperthyroidism       History of Present Illness:   Debbie Clemens is a 46 y.o. female who presents today for follow up on Graves' disease.    She had labs on 2021.  TSH was undetectable, free T4 was 2.38.  She had labs repeated on 2021.  The TSH was undetectable, free T4 was 2.19, total T3 elevated.  TPO was positive.  TSI was positive at 2.01 (ref range 0-0.55).  She had thyroid ultrasound on 2021 - US Thyroid (2021 15:56).  There were small nodules bilaterally, largest nodule on right 7 mm and largest on the left 4 mm.  No increased blood flow within nodules.  No dominant nodules.  No significant enlargement.    Added methimazole 10 mg daily last month.  She reports that she has only been taking the methimazole for 1 week now.  Seems to be tolerating okay.  She notes heat intolerance.  She notes fatigue.  She notes anxiety.  She has not noticed any changes in the size of her neck.  She reports that eyes are still red, but improving some days now.  She still notes puffiness under eyes.  She denies any diplopia.      Subjective      Review of Systems:  Review of Systems   Constitutional: Positive for diaphoresis and fatigue.   HENT: Negative.    Eyes: Positive for redness.   Cardiovascular: Negative.    Gastrointestinal: Negative.    Endocrine: Positive for heat intolerance.   Musculoskeletal: Positive for back pain.   Psychiatric/Behavioral:        Anxiety       The following portions of the patient's history were reviewed and updated as appropriate: allergies, current medications, past family history, past medical history, past social history, past surgical history and problem list.    Objective     Vitals:    22 1543   BP: 122/72   BP Location: Right arm   Patient Position: Sitting   Cuff Size: Adult   Pulse: 84   Weight: 95.3 kg (210 lb  "3.2 oz)   Height: 160 cm (63\")     Body mass index is 37.24 kg/m².    Physical Exam  Vitals reviewed.   Constitutional:       General: She is not in acute distress.  Eyes:      Extraocular Movements: Extraocular movements intact.      Conjunctiva/sclera:      Right eye: Right conjunctiva is injected (mild).      Left eye: Left conjunctiva is injected (mild).      Comments: Puffiness to lower lids.   Neck:      Thyroid: No thyroid mass, thyromegaly or thyroid tenderness.   Lymphadenopathy:      Cervical: No cervical adenopathy.   Neurological:      Mental Status: She is alert and oriented to person, place, and time.   Psychiatric:         Mood and Affect: Mood normal.         TSH  Lab Results   Component Value Date    TSH <0.005 (L) 12/15/2021      T4  Lab Results   Component Value Date    FREET4 2.09 (H) 12/15/2021     THYROID AUTOANTIBODIES  Thyroid Peroxidase Antibody   Date Value Ref Range Status   09/13/2021 42 (H) 0 - 34 IU/mL Final     Thyroid Stimulating Immunoglobulin   Date Value Ref Range Status   09/13/2021 2.01 (H) 0.00 - 0.55 IU/L Final     CBC  Lab Results   Component Value Date    WBC 6.67 07/29/2021    RBC 4.62 07/29/2021    HGB 12.8 07/29/2021    HCT 40.4 07/29/2021    MCV 87.4 07/29/2021    MCH 27.7 07/29/2021    MCHC 31.7 07/29/2021    RDW 15.9 (H) 07/29/2021    RDWSD 51.2 07/29/2021    MPV 11.1 07/29/2021     07/29/2021     CMP  Lab Results   Component Value Date    GLUCOSE 102 (H) 07/29/2021    BUN 11 07/29/2021    CREATININE 0.65 07/29/2021    EGFRIFNONA 98 07/29/2021    BCR 16.9 07/29/2021     07/29/2021    K 4.1 07/29/2021    CO2 27.2 07/29/2021    CALCIUM 9.7 07/29/2021    ALBUMIN 4.30 07/29/2021    BILITOT 0.3 07/29/2021    ALKPHOS 113 07/29/2021    AST 20 07/29/2021    ALT 17 07/29/2021       Current Outpatient Medications   Medication Instructions   • atenolol (TENORMIN) 12.5 mg, Oral, Daily   • Brimonidine Tartrate (Lumify) 0.025 % solution ophthalmic solution Every 8 Hours " Scheduled   • cyanocobalamin 1000 MCG/ML injection INJECT 1 ML AS DIRECTED EVERY 30 DAYS   • doxycycline (MONODOX) 100 mg, Oral, 2 Times Daily With Meals   • fluticasone (Flonase) 50 MCG/ACT nasal spray 2 sprays, Nasal, Daily   • hydrOXYzine (ATARAX) 25 mg, Oral, 3 Times Daily PRN   • methIMAzole (TAPAZOLE) 10 mg, Oral, Daily   • multivitamin with minerals (MULTIVITAMIN ADULT PO) 1 tablet, Oral, Daily   • Vedolizumab (ENTYVIO IV) Intravenous, Every 8 weeks-  2 weeks after surgery will have       Assessment / Plan      Assessment & Plan:  1. Graves' disease  She has just started methimazole, so too soon to see improvement in symptoms.  Check labs in ~2 months.  She will come to our office to have labs drawn.  - TSH; Future  - T4, Free; Future  - Comprehensive Metabolic Panel; Future  - CBC & Differential; Future  - Ambulatory Referral to Ophthalmology    2. Conjunctival hyperemia, bilateral  May be related to thyroid eye disease.  Refer to ophthalmology for further evaluation - Dr. Martin London.  - Ambulatory Referral to Ophthalmology      Return in about 4 months (around 5/4/2022) for recheck with TFTs. She was advised to contact the office with any interval questions or concerns.    GEN Calloway  Endocrinology  01/04/2022

## 2022-02-25 ENCOUNTER — LAB (OUTPATIENT)
Dept: ENDOCRINOLOGY | Facility: CLINIC | Age: 47
End: 2022-02-25

## 2022-02-25 ENCOUNTER — LAB (OUTPATIENT)
Dept: LAB | Facility: HOSPITAL | Age: 47
End: 2022-02-25

## 2022-02-25 DIAGNOSIS — E05.00 GRAVES' DISEASE: ICD-10-CM

## 2022-02-25 LAB
ALBUMIN SERPL-MCNC: 4.3 G/DL (ref 3.5–5.2)
ALBUMIN/GLOB SERPL: 1.5 G/DL
ALP SERPL-CCNC: 165 U/L (ref 39–117)
ALT SERPL W P-5'-P-CCNC: 15 U/L (ref 1–33)
ANION GAP SERPL CALCULATED.3IONS-SCNC: 13.2 MMOL/L (ref 5–15)
AST SERPL-CCNC: 14 U/L (ref 1–32)
BASOPHILS # BLD AUTO: 0.08 10*3/MM3 (ref 0–0.2)
BASOPHILS NFR BLD AUTO: 1 % (ref 0–1.5)
BILIRUB SERPL-MCNC: 0.2 MG/DL (ref 0–1.2)
BUN SERPL-MCNC: 11 MG/DL (ref 6–20)
BUN/CREAT SERPL: 15.3 (ref 7–25)
CALCIUM SPEC-SCNC: 9.6 MG/DL (ref 8.6–10.5)
CHLORIDE SERPL-SCNC: 100 MMOL/L (ref 98–107)
CO2 SERPL-SCNC: 25.8 MMOL/L (ref 22–29)
CREAT SERPL-MCNC: 0.72 MG/DL (ref 0.57–1)
DEPRECATED RDW RBC AUTO: 41.1 FL (ref 37–54)
EOSINOPHIL # BLD AUTO: 0.35 10*3/MM3 (ref 0–0.4)
EOSINOPHIL NFR BLD AUTO: 4.2 % (ref 0.3–6.2)
ERYTHROCYTE [DISTWIDTH] IN BLOOD BY AUTOMATED COUNT: 12.8 % (ref 12.3–15.4)
GFR SERPL CREATININE-BSD FRML MDRD: 87 ML/MIN/1.73
GLOBULIN UR ELPH-MCNC: 2.9 GM/DL
GLUCOSE SERPL-MCNC: 92 MG/DL (ref 65–99)
HCT VFR BLD AUTO: 38.4 % (ref 34–46.6)
HGB BLD-MCNC: 12.9 G/DL (ref 12–15.9)
IMM GRANULOCYTES # BLD AUTO: 0.02 10*3/MM3 (ref 0–0.05)
IMM GRANULOCYTES NFR BLD AUTO: 0.2 % (ref 0–0.5)
LYMPHOCYTES # BLD AUTO: 2.85 10*3/MM3 (ref 0.7–3.1)
LYMPHOCYTES NFR BLD AUTO: 33.8 % (ref 19.6–45.3)
MCH RBC QN AUTO: 29.9 PG (ref 26.6–33)
MCHC RBC AUTO-ENTMCNC: 33.6 G/DL (ref 31.5–35.7)
MCV RBC AUTO: 89.1 FL (ref 79–97)
MONOCYTES # BLD AUTO: 0.64 10*3/MM3 (ref 0.1–0.9)
MONOCYTES NFR BLD AUTO: 7.6 % (ref 5–12)
NEUTROPHILS NFR BLD AUTO: 4.48 10*3/MM3 (ref 1.7–7)
NEUTROPHILS NFR BLD AUTO: 53.2 % (ref 42.7–76)
NRBC BLD AUTO-RTO: 0 /100 WBC (ref 0–0.2)
PLATELET # BLD AUTO: 391 10*3/MM3 (ref 140–450)
PMV BLD AUTO: 10.1 FL (ref 6–12)
POTASSIUM SERPL-SCNC: 4 MMOL/L (ref 3.5–5.2)
PROT SERPL-MCNC: 7.2 G/DL (ref 6–8.5)
RBC # BLD AUTO: 4.31 10*6/MM3 (ref 3.77–5.28)
SODIUM SERPL-SCNC: 139 MMOL/L (ref 136–145)
T4 FREE SERPL-MCNC: 1.79 NG/DL (ref 0.93–1.7)
TSH SERPL DL<=0.05 MIU/L-ACNC: <0.005 UIU/ML (ref 0.27–4.2)
WBC NRBC COR # BLD: 8.42 10*3/MM3 (ref 3.4–10.8)

## 2022-02-25 PROCEDURE — 84443 ASSAY THYROID STIM HORMONE: CPT

## 2022-02-25 PROCEDURE — 85025 COMPLETE CBC W/AUTO DIFF WBC: CPT

## 2022-02-25 PROCEDURE — 80053 COMPREHEN METABOLIC PANEL: CPT

## 2022-02-25 PROCEDURE — 84439 ASSAY OF FREE THYROXINE: CPT

## 2022-03-08 DIAGNOSIS — E05.00 GRAVES' DISEASE: ICD-10-CM

## 2022-03-08 RX ORDER — METHIMAZOLE 10 MG/1
20 TABLET ORAL DAILY
Qty: 60 TABLET | Refills: 3 | Status: SHIPPED | OUTPATIENT
Start: 2022-03-08 | End: 2022-11-14

## 2022-04-05 ENCOUNTER — OFFICE VISIT (OUTPATIENT)
Dept: INTERNAL MEDICINE | Facility: CLINIC | Age: 47
End: 2022-04-05

## 2022-04-05 ENCOUNTER — LAB (OUTPATIENT)
Dept: LAB | Facility: HOSPITAL | Age: 47
End: 2022-04-05

## 2022-04-05 VITALS
WEIGHT: 209 LBS | SYSTOLIC BLOOD PRESSURE: 126 MMHG | BODY MASS INDEX: 37.03 KG/M2 | TEMPERATURE: 96.8 F | HEIGHT: 63 IN | OXYGEN SATURATION: 99 % | DIASTOLIC BLOOD PRESSURE: 78 MMHG | RESPIRATION RATE: 20 BRPM | HEART RATE: 91 BPM

## 2022-04-05 DIAGNOSIS — R00.0 TACHYCARDIA: ICD-10-CM

## 2022-04-05 DIAGNOSIS — Z00.00 ROUTINE GENERAL MEDICAL EXAMINATION AT A HEALTH CARE FACILITY: ICD-10-CM

## 2022-04-05 DIAGNOSIS — R73.09 ABNORMAL GLUCOSE: ICD-10-CM

## 2022-04-05 DIAGNOSIS — E53.8 VITAMIN B12 DEFICIENCY: ICD-10-CM

## 2022-04-05 DIAGNOSIS — Z23 NEED FOR VACCINATION: ICD-10-CM

## 2022-04-05 DIAGNOSIS — R74.8 ELEVATED ALKALINE PHOSPHATASE LEVEL: ICD-10-CM

## 2022-04-05 DIAGNOSIS — Z00.00 ROUTINE GENERAL MEDICAL EXAMINATION AT A HEALTH CARE FACILITY: Primary | ICD-10-CM

## 2022-04-05 DIAGNOSIS — J30.9 ALLERGIC RHINITIS, UNSPECIFIED SEASONALITY, UNSPECIFIED TRIGGER: ICD-10-CM

## 2022-04-05 DIAGNOSIS — E55.9 VITAMIN D DEFICIENCY: ICD-10-CM

## 2022-04-05 DIAGNOSIS — E05.90 HYPERTHYROIDISM: ICD-10-CM

## 2022-04-05 DIAGNOSIS — Z11.59 NEED FOR HEPATITIS C SCREENING TEST: ICD-10-CM

## 2022-04-05 LAB
ALBUMIN SERPL-MCNC: 4.6 G/DL (ref 3.5–5.2)
ALBUMIN/GLOB SERPL: 1.4 G/DL
ALP SERPL-CCNC: 172 U/L (ref 39–117)
ALT SERPL W P-5'-P-CCNC: 14 U/L (ref 1–33)
ANION GAP SERPL CALCULATED.3IONS-SCNC: 10 MMOL/L (ref 5–15)
AST SERPL-CCNC: 17 U/L (ref 1–32)
BILIRUB SERPL-MCNC: 0.2 MG/DL (ref 0–1.2)
BUN SERPL-MCNC: 9 MG/DL (ref 6–20)
BUN/CREAT SERPL: 14.5 (ref 7–25)
CALCIUM SPEC-SCNC: 9.5 MG/DL (ref 8.6–10.5)
CHLORIDE SERPL-SCNC: 100 MMOL/L (ref 98–107)
CO2 SERPL-SCNC: 27 MMOL/L (ref 22–29)
CREAT SERPL-MCNC: 0.62 MG/DL (ref 0.57–1)
EGFRCR SERPLBLD CKD-EPI 2021: 111.4 ML/MIN/1.73
GLOBULIN UR ELPH-MCNC: 3.2 GM/DL
GLUCOSE SERPL-MCNC: 90 MG/DL (ref 65–99)
HBA1C MFR BLD: 5.9 % (ref 4.8–5.6)
POTASSIUM SERPL-SCNC: 3.7 MMOL/L (ref 3.5–5.2)
PROT SERPL-MCNC: 7.8 G/DL (ref 6–8.5)
SODIUM SERPL-SCNC: 137 MMOL/L (ref 136–145)

## 2022-04-05 PROCEDURE — 82306 VITAMIN D 25 HYDROXY: CPT | Performed by: PHYSICIAN ASSISTANT

## 2022-04-05 PROCEDURE — 86803 HEPATITIS C AB TEST: CPT | Performed by: PHYSICIAN ASSISTANT

## 2022-04-05 PROCEDURE — 0054A COVID-19 (PFIZER) 12+ YRS: CPT | Performed by: PHYSICIAN ASSISTANT

## 2022-04-05 PROCEDURE — 99396 PREV VISIT EST AGE 40-64: CPT | Performed by: PHYSICIAN ASSISTANT

## 2022-04-05 PROCEDURE — 91305 COVID-19 (PFIZER) 12+ YRS: CPT | Performed by: PHYSICIAN ASSISTANT

## 2022-04-05 PROCEDURE — 83036 HEMOGLOBIN GLYCOSYLATED A1C: CPT | Performed by: PHYSICIAN ASSISTANT

## 2022-04-05 PROCEDURE — 80053 COMPREHEN METABOLIC PANEL: CPT | Performed by: PHYSICIAN ASSISTANT

## 2022-04-05 RX ORDER — LORATADINE 10 MG/1
10 TABLET ORAL DAILY
COMMUNITY

## 2022-04-05 NOTE — PROGRESS NOTES
Chief Complaint  Annual Exam (Pt would like COVID booster. )    Subjective          History of Present Illness  Debbie Clemens presents to [facility] for a routine physical.  Hyperthyroid:  Seeing endo and is on methimazole at this time. Has f/u with them and repeat labs in May. Was found to have elevated alk phos which may be related to hyperthyroid.    Anxiety:  Is controlling things better lately. Feels like her daughter is the source of most of her stress. Sleeping well.   Feels like sx started after having hysterectomy last May.     Hx Breast CA:  Had total hysterectomy May 2021 due to prolonged periods and breast cancer hx.  Her breast CA was stage III and it was dx due to lump in her arm pit. Was seeing Dr Yuen for oncology. Had double mastectomy in Jan 2014. Was planning on reconstruction at some point.     Tachycardia:  Has seen cardio for this, has f/u with them next week. Had normal Echo. Elevated HR likely related to thyroid dz.   No CP, SOA, edema, diaphoresis. Has not had hx of HTN. Father had AMI, first one at age 50s, no fhx of stroke. HR has been up to 120 at rest.     Crohns dz:  Followed by Dr Grant for 30 years, started Entyvio in 2018, doing well with that. Takes vit B12 inj. Used to be on prednisone long term for this.    Diet/exercise: trying to work on healthy eating  Eye exams: sees eye dr regularly. Wearing glasses. Had some eye redness recently that she was seeing an eye dr for but they never figured out the cause, her eyes are no longer red.  Dental exams: sees dentist regularly    Patient's last menstrual period was 02/05/2021 (approximate).   reports previously being sexually active and has had partner(s) who are female. She reports using the following method of birth control/protection: None.  Cancer-related family history is negative for Ovarian cancer and Breast cancer.  Mammogram- hx of double mastectomy 2014, did have lymph node removal in left  axilla.  Pap-  Hx of complete hysterectomy 2021  DEXA- 2015 normal. Hx of long term prednisone use, hx of hysterectomy age 45.  Colonoscopy- Dec 2019, normal. Has Crohns dz and is supposed to get them done every 3 years.      Health Maintenance   Topic Date Due   • ANNUAL PHYSICAL  Never done   • HEPATITIS C SCREENING  Never done   • INFLUENZA VACCINE  08/01/2022   • COLORECTAL CANCER SCREENING  12/12/2022   • TDAP/TD VACCINES (2 - Td or Tdap) 05/16/2027   • COVID-19 Vaccine  Completed   • Pneumococcal Vaccine 0-64  Aged Out   • PAP SMEAR  Discontinued       Immunization History   Administered Date(s) Administered   • COVID-19 (PFIZER) PURPLE CAP 03/11/2021, 04/08/2021   • Covid-19 (Pfizer) Gray Cap 04/05/2022   • Flu Vaccine Intradermal Quad 18-64YR 10/12/2015   • Fluzone Split Quad (Multi-dose) 10/14/2019   • Hepatitis A 05/24/2019, 12/04/2019   • MMR 05/24/2019   • Td, Unspecified 02/02/2004   • Tdap 05/16/2017   • flucelvax quad pfs =>4 YRS 10/22/2019       Review of Systems   Constitutional: Negative for fatigue, fever and unexpected weight loss.   Eyes: Negative for visual disturbance.   Respiratory: Negative for cough, shortness of breath and wheezing.    Cardiovascular: Negative for chest pain and palpitations.   Gastrointestinal: Positive for diarrhea. Negative for abdominal pain, blood in stool, constipation, nausea and indigestion.   Endocrine: Negative for polydipsia and polyuria.   Genitourinary: Negative for dysuria and hematuria.   Musculoskeletal: Negative for arthralgias, back pain, joint swelling and myalgias.   Skin: Negative for rash.   Neurological: Negative for dizziness, syncope, headache and confusion.   Psychiatric/Behavioral: Negative for sleep disturbance and depressed mood. The patient is nervous/anxious.        The following portions of the patient's history were reviewed and updated as appropriate: allergies, current medications, past family history, past medical history, past social  history, past surgical history and problem list.    Patient Active Problem List   Diagnosis   • Ductal carcinoma of left breast (HCC)   • Crohn disease (HCC)   • Crohn's disease of large intestine with rectal bleeding (HCC)   • Intraductal carcinoma of breast   • Acute infection of nasal sinus   • Cellulitis of neck   • Visual impairment   • Redness of eye, right   • Mass of subcutaneous tissue of back   • Tachycardia   • Right hip pain   • Abnormal EKG   • Palpitations   • History of breast cancer   • Hyperthyroidism   • Anxiety   • Dizziness   • Vitamin B12 deficiency   • Routine general medical examination at a health care facility     Allergies   Allergen Reactions   • Lialda [Mesalamine] GI Intolerance     Worse symptoms of crohns   • Nuts Swelling     TREE NUTS, makes tongue swell   • Prilosec [Omeprazole] Diarrhea   • Epinephrine Nausea Only     Jittery and nausea     Current Outpatient Medications on File Prior to Visit   Medication Sig Dispense Refill   • fluticasone (Flonase) 50 MCG/ACT nasal spray 2 sprays into the nostril(s) as directed by provider Daily. 16 g 2   • hydrOXYzine (ATARAX) 25 MG tablet Take 1 tablet by mouth 3 (Three) Times a Day As Needed for Anxiety. 60 tablet 0   • loratadine (CLARITIN) 10 MG tablet Take 10 mg by mouth Daily.     • methIMAzole (TAPAZOLE) 10 MG tablet Take 2 tablets by mouth Daily. 60 tablet 3   • Vedolizumab (ENTYVIO IV) Infuse  into a venous catheter. Every 8 weeks-  2 weeks after surgery will have     • Brimonidine Tartrate (Lumify) 0.025 % solution ophthalmic solution Every 8 (Eight) Hours.     • cyanocobalamin 1000 MCG/ML injection INJECT 1 ML AS DIRECTED EVERY 30 DAYS 1 mL 5   • multivitamin with minerals tablet tablet Take 1 tablet by mouth Daily.     • [DISCONTINUED] atenolol (TENORMIN) 25 MG tablet Take 0.5 tablets by mouth Daily. 45 tablet 3   • [DISCONTINUED] doxycycline (MONODOX) 100 MG capsule Take 100 mg by mouth 2 (Two) Times a Day With Meals.       No  current facility-administered medications on file prior to visit.     No orders of the defined types were placed in this encounter.    Past Medical History:   Diagnosis Date   • Abnormal EKG    • Breast cancer (HCC)     left   • Crohn's disease (HCC)     age 13   • Heartburn     tums prn    • Hyperthyroidism    • Hypothyroidism 2021   • Slow to wake up after anesthesia     for colonoscopy - d and c was fine    • Wears glasses       Past Surgical History:   Procedure Laterality Date   •  SECTION     • COLONOSCOPY  2019   • D & C HYSTEROSCOPY N/A 3/17/2021    Procedure: HYSTEROSCOPY DILATION & CURRETAGE;  Surgeon: Liv Lorenzana MD;  Location:  [facility];  Service: Obstetrics/Gynecology;  Laterality: N/A;   • MASTECTOMY Bilateral    • ROOT CANAL  2017    Dr Teague    • TOTAL LAPAROSCOPIC HYSTERECTOMY SALPINGO OOPHORECTOMY N/A 2021    Procedure: ROBOTIC ASSISTED HYSTERECTOMY WITH BILATERAL SALPINGO-OOPHORECTOMY AND CYSTOSCOPY;  Surgeon: Liv Lorenzana MD;  Location:  [facility];  Service: [facility];  Laterality: N/A;      Family History   Problem Relation Age of Onset   • Hypertension Other    • Heart disease Other    • Hyperlipidemia Other    • Heart attack Father         3 heart attacks   • Hypertension Mother         High blood pressure   • Ovarian cancer Neg Hx    • Breast cancer Neg Hx       Social History     Socioeconomic History   • Marital status:    Tobacco Use   • Smoking status: Former Smoker     Packs/day: 0.75     Years: 10.00     Pack years: 7.50     Types: Cigarettes     Start date: 2000     Quit date:      Years since quittin.2   • Smokeless tobacco: Never Used   Vaping Use   • Vaping Use: Never used   Substance and Sexual Activity   • Alcohol use: Yes     Alcohol/week: 4.0 standard drinks     Types: 2 Glasses of wine, 2 Cans of beer per week     Comment: couple glasses of wine a week   • Drug use: No   • Sexual activity: Not Currently     " Partners: Female     Birth control/protection: None        Objective   Vital Signs:   Vitals:    04/05/22 1306   BP: 126/78   Pulse: 91   Resp: 20   Temp: 96.8 °F (36 °C)   TempSrc: Temporal   SpO2: 99%   Weight: 94.8 kg (209 lb)   Height: 160 cm (63\")   PainSc: 0-No pain      Body mass index is 37.02 kg/m².  Patient's Body mass index is 37.02 kg/m². indicating that she is obese (BMI >30). Obesity-related health conditions include the following: none. Obesity is unchanged. BMI is is above average; BMI management plan is completed. We discussed low calorie, low carb based diet program, portion control and increasing exercise..    Physical Exam  Vitals reviewed.   Constitutional:       General: She is not in acute distress.     Appearance: Normal appearance. She is not ill-appearing.   HENT:      Head: Normocephalic and atraumatic.      Right Ear: Tympanic membrane, ear canal and external ear normal.      Left Ear: Tympanic membrane, ear canal and external ear normal.      Mouth/Throat:      Mouth: Mucous membranes are moist.      Pharynx: No oropharyngeal exudate or posterior oropharyngeal erythema.   Eyes:      General: No scleral icterus.     Extraocular Movements: Extraocular movements intact.      Conjunctiva/sclera: Conjunctivae normal.      Pupils: Pupils are equal, round, and reactive to light.   Cardiovascular:      Rate and Rhythm: Normal rate and regular rhythm.      Pulses: Normal pulses.      Heart sounds: Normal heart sounds. No murmur heard.  Pulmonary:      Effort: Pulmonary effort is normal. No respiratory distress.      Breath sounds: Normal breath sounds. No stridor. No wheezing, rhonchi or rales.   Abdominal:      General: Bowel sounds are normal. There is no distension.      Palpations: Abdomen is soft. There is no mass.      Tenderness: There is no abdominal tenderness. There is no guarding.   Musculoskeletal:         General: No signs of injury. Normal range of motion.      Cervical back: " Normal range of motion and neck supple.      Right lower leg: No edema.      Left lower leg: No edema.   Lymphadenopathy:      Cervical: No cervical adenopathy.   Skin:     General: Skin is warm and dry.      Coloration: Skin is not jaundiced.      Findings: No rash.   Neurological:      General: No focal deficit present.      Mental Status: She is alert and oriented to person, place, and time.      Gait: Gait normal.   Psychiatric:         Mood and Affect: Mood normal.         Behavior: Behavior normal.          Result Review :                   Assessment and Plan    Diagnoses and all orders for this visit:    1. Routine general medical examination at a health care facility (Primary)  -     Comprehensive Metabolic Panel; Future    2. Hyperthyroidism  Assessment & Plan:  Cont Methimazole as directed      3. Vitamin B12 deficiency    4. Allergic rhinitis, unspecified seasonality, unspecified trigger    5. Tachycardia  Assessment & Plan:  Cont to f/u with cardio as directed. Likely related to thyroid dz      6. Need for hepatitis C screening test  -     Hepatitis C Antibody; Future    7. Need for vaccination  -     COVID-19 Vaccine (Pfizer) Gray Cap    8. Elevated alkaline phosphatase level    9. Abnormal glucose  -     Hemoglobin A1c; Future    10. Vitamin D deficiency  -     Vitamin D 25 Hydroxy; Future    11. Body mass index (BMI) of 37.0 to 37.9 in adult          Follow Up   Return in about 6 months (around 10/5/2022) for Recheck.    Counseled on health maintenance topics and preventative care recommendations. Follow up yearly for routine physical exam. Diet and exercise counseling given. See dentist and eye doctor yearly as directed.    If labs or images are ordered we will contact you with the results within the next week.  If you have not heard from us after a week please call our office to inquire about the results.    Krista Don PA-C    Patient was given instructions and counseling regarding her  condition or for health maintenance advice. Please see specific information pulled into the AVS if appropriate.     * Please note that portions of this note were completed with a voice recognition program.

## 2022-04-06 DIAGNOSIS — E55.9 VITAMIN D DEFICIENCY: Primary | ICD-10-CM

## 2022-04-06 LAB
25(OH)D3 SERPL-MCNC: 16.4 NG/ML (ref 30–100)
HCV AB SER DONR QL: NORMAL

## 2022-04-06 RX ORDER — ERGOCALCIFEROL 1.25 MG/1
50000 CAPSULE ORAL WEEKLY
Qty: 8 CAPSULE | Refills: 0 | Status: SHIPPED | OUTPATIENT
Start: 2022-04-06 | End: 2022-04-06

## 2022-04-06 RX ORDER — ERGOCALCIFEROL 1.25 MG/1
CAPSULE ORAL
Qty: 12 CAPSULE | Refills: 0 | Status: SHIPPED | OUTPATIENT
Start: 2022-04-06

## 2022-04-06 NOTE — TELEPHONE ENCOUNTER
----- Message from Krista Don PA-C sent at 4/6/2022 10:13 AM EDT -----  Your vitamin D was low, I am going to send in a weekly high dose vitamin D supplement for you to take for 8 weeks to increase your levels then take a 2000u over the counter vitamin D supplement daily to maintain your levels.  Your alkaline phosphatase is still elevated, we will monitor this to make sure it decreases as your thyroid levels improve. Your A1c was 5.9 which is a little elevated from previous but still in prediabetic range.

## 2022-04-13 NOTE — PROGRESS NOTES
OFFICE VISIT  NOTE  [facility] CARDIOLOGY      Name: Debbie Clemens    Date: 2022  MRN:  [id number]  :  1975      REFERRING/PRIMARY PROVIDER:  Krista Don PA-C     Chief Complaint   Patient presents with   • Rapid Heart Rate       HPI: Debbie Clemens is a 46 y.o. female who presents today for follow up of tachycardia.  Infusion nurse told her she had an elevated heart rate in May 2021.  She wears a fit bit and noticed her heart rate was staying around 100.  PCP started atenolol 25 mg daily 2021.  Since then she feels more dizzy and more fatigued, and this was reduced to 12.5mg daily at last visit.  Denies palpitations even prior to atenolol.  Recent thyroid studies show hypothyroidism, she was diagnosed with Graves' disease and started on Methimazole, follows with endocrinology. Echo 10/2021 showed normal LVEF, no structural abnormalities.     Doing much better now that she is on methimazole, palpitations stopped, she does not take atenolol any longer.    ROS:Pertinent positives as listed in the HPI.  All other systems reviewed and negative.    Past Medical History:   Diagnosis Date   • Abnormal EKG    • Breast cancer (HCC)     left   • Crohn's disease (HCC)     age 13   • Heartburn     tums prn    • Hyperthyroidism    • Hypothyroidism 2021   • Slow to wake up after anesthesia     for colonoscopy - d and c was fine    • Wears glasses        Past Surgical History:   Procedure Laterality Date   •  SECTION     • COLONOSCOPY  2019   • D & C HYSTEROSCOPY N/A 3/17/2021    Procedure: HYSTEROSCOPY DILATION & CURRETAGE;  Surgeon: Liv Lorenzana MD;  Location: [facility];  Service: Obstetrics/Gynecology;  Laterality: N/A;   • MASTECTOMY Bilateral    • ROOT CANAL  2017    Dr Teague    • TOTAL LAPAROSCOPIC HYSTERECTOMY SALPINGO OOPHORECTOMY N/A 2021    Procedure: ROBOTIC ASSISTED HYSTERECTOMY WITH BILATERAL SALPINGO-OOPHORECTOMY AND CYSTOSCOPY;   Surgeon: Liv Lorenzana MD;  Location: [facility];  Service: [facility];  Laterality: N/A;       Social History     Socioeconomic History   • Marital status:    Tobacco Use   • Smoking status: Former Smoker     Packs/day: 0.75     Years: 10.00     Pack years: 7.50     Types: Cigarettes     Start date: 2000     Quit date:      Years since quittin.2   • Smokeless tobacco: Never Used   Vaping Use   • Vaping Use: Never used   Substance and Sexual Activity   • Alcohol use: Yes     Alcohol/week: 4.0 standard drinks     Types: 2 Glasses of wine, 2 Cans of beer per week     Comment: couple glasses of wine a week   • Drug use: No   • Sexual activity: Not Currently     Partners: Female     Birth control/protection: None       Family History   Problem Relation Age of Onset   • Hypertension Other    • Heart disease Other    • Hyperlipidemia Other    • Heart attack Father         3 heart attacks   • Hypertension Mother         High blood pressure   • Ovarian cancer Neg Hx    • Breast cancer Neg Hx         Allergies   Allergen Reactions   • Lialda [Mesalamine] GI Intolerance     Worse symptoms of crohns   • Nuts Swelling     TREE NUTS, makes tongue swell   • Prilosec [Omeprazole] Diarrhea   • Epinephrine Nausea Only     Jittery and nausea       Current Outpatient Medications   Medication Instructions   • Brimonidine Tartrate (Lumify) 0.025 % solution ophthalmic solution Every 8 Hours Scheduled   • fluticasone (Flonase) 50 MCG/ACT nasal spray 2 sprays, Nasal, Daily   • hydrOXYzine (ATARAX) 25 mg, Oral, 3 Times Daily PRN   • loratadine (CLARITIN) 10 mg, Oral, Daily   • methIMAzole (TAPAZOLE) 20 mg, Oral, Daily   • multivitamin with minerals tablet tablet 1 tablet, Oral, Daily   • Vedolizumab (ENTYVIO IV) Intravenous, Every 8 weeks-  2 weeks after surgery will have   • vitamin D (ERGOCALCIFEROL) 1.25 MG (40927 UT) capsule capsule TAKE 1 CAPSULE BY MOUTH 1 TIME EVERY WEEK       Vitals:    22 1436  "  BP: 130/78   BP Location: Left arm   Patient Position: Sitting   Pulse: 99   SpO2: 98%   Weight: 95.5 kg (210 lb 9.6 oz)   Height: 162.6 cm (64\")     Body mass index is 36.15 kg/m².    PHYSICAL EXAM:    General Appearance:   · well developed  · well nourished  Neck:  · thyroid not enlarged  · supple  Respiratory:  · no respiratory distress  · normal breath sounds  · no rales  Cardiovascular:  · no jugular venous distention  · regular rhythm  · apical impulse normal  · S1 normal, S2 normal  · no S3, no S4   · no murmur  · no rub, no thrill  · carotid pulses normal; no bruit  · pedal pulses normal  · lower extremity edema: none    Skin:   warm, dry    RESULTS:   Procedures    Results for orders placed during the hospital encounter of 10/05/21    Adult Transthoracic Echo Complete W/ Cont if Necessary Per Protocol    Interpretation Summary  · Estimated left ventricular EF = 55% Left ventricular ejection fraction appears to be 51 - 55%. Left ventricular systolic function is normal.  · Left ventricular diastolic function was normal.        Labs:  Lab Results   Component Value Date    CHOL 152 07/29/2021    TRIG 110 07/29/2021    HDL 39 (L) 07/29/2021    LDL 93 07/29/2021    AST 17 04/05/2022    ALT 14 04/05/2022     Lab Results   Component Value Date    HGBA1C 5.90 (H) 04/05/2022     Creatinine   Date Value Ref Range Status   04/05/2022 0.62 0.57 - 1.00 mg/dL Final   02/25/2022 0.72 0.57 - 1.00 mg/dL Final   07/29/2021 0.65 0.57 - 1.00 mg/dL Final     eGFR Non  Amer   Date Value Ref Range Status   02/25/2022 87 >60 mL/min/1.73 Final   07/29/2021 98 >60 mL/min/1.73 Final   05/03/2021 99 >60 mL/min/1.73 Final         ASSESSMENT:  Problem List Items Addressed This Visit        Cardiac and Vasculature    Tachycardia    Overview     · Likely related to Graves' disease  · Echo 10/2021 with normal LVEF, no structural abnormalities            Abnormal EKG    Palpitations - Primary          PLAN:  1.  Tachycardia:  Likely " related to hyperthyroidism  No beta-blocker needed at this time  Increase water intake  Follow up with Endocrinology for Graves's disease   Symptoms resolved.     2.  LVH on EKG at PCP:  Echocardiogram 10/2021 with normal LVEF, no LVH     3.  Graves' disease:  Follow-up with endocrinology for further treatment  On Methimazole        Follow-up   Return if symptoms worsen or fail to improve.    Tony Childress MD, FACC  Interventional Cardiology

## 2022-04-14 ENCOUNTER — OFFICE VISIT (OUTPATIENT)
Dept: CARDIOLOGY | Facility: CLINIC | Age: 47
End: 2022-04-14

## 2022-04-14 VITALS
OXYGEN SATURATION: 98 % | HEART RATE: 99 BPM | BODY MASS INDEX: 35.96 KG/M2 | DIASTOLIC BLOOD PRESSURE: 78 MMHG | HEIGHT: 64 IN | SYSTOLIC BLOOD PRESSURE: 130 MMHG | WEIGHT: 210.6 LBS

## 2022-04-14 DIAGNOSIS — R00.2 PALPITATIONS: Primary | ICD-10-CM

## 2022-04-14 DIAGNOSIS — R00.0 TACHYCARDIA: ICD-10-CM

## 2022-04-14 DIAGNOSIS — R94.31 ABNORMAL EKG: ICD-10-CM

## 2022-04-14 PROCEDURE — 99213 OFFICE O/P EST LOW 20 MIN: CPT | Performed by: INTERNAL MEDICINE

## 2022-04-15 ENCOUNTER — TELEPHONE (OUTPATIENT)
Dept: ONCOLOGY | Facility: CLINIC | Age: 47
End: 2022-04-15

## 2022-04-15 NOTE — TELEPHONE ENCOUNTER
Caller: Debbie Clemens    Relationship: Self    Best call back number: [phone]    What is the best time to reach you: ANY    Who are you requesting to speak with (clinical staff, provider,  specific staff member): CLINICAL      What was the call regarding: PATIENT STATED DR MANCERA TOLD HER ANYTIME SHE NEEDED BRAS OR INSERTS TO CALL AND WE COULD HELP    Do you require a callback: YES

## 2022-04-15 NOTE — TELEPHONE ENCOUNTER
Called patient and informed her [facility]. Giving her their phone number to call to set up appointment.

## 2022-06-28 ENCOUNTER — OFFICE VISIT (OUTPATIENT)
Dept: GASTROENTEROLOGY | Facility: CLINIC | Age: 47
End: 2022-06-28

## 2022-06-28 DIAGNOSIS — E53.8 VITAMIN B 12 DEFICIENCY: ICD-10-CM

## 2022-06-28 DIAGNOSIS — Z79.899 LONG TERM CURRENT USE OF IMMUNOSUPPRESSIVE DRUG: ICD-10-CM

## 2022-06-28 DIAGNOSIS — K50.10 CROHN'S DISEASE OF LARGE INTESTINE WITHOUT COMPLICATION: Primary | ICD-10-CM

## 2022-06-28 PROCEDURE — 99213 OFFICE O/P EST LOW 20 MIN: CPT | Performed by: INTERNAL MEDICINE

## 2022-06-28 NOTE — PROGRESS NOTES
Patient Name: Debbie Clemens   YOB: 1975   Medical Record number: [id number] Debbie has requested and consented to a telephone visit for her health care today.      Chief Complaint: Crohn's disease      HPI Debbie is an established patient of mine.  I have taken care of her since she was about [age] years old.  She currently is on Entyvio since 2018.  She is getting her infusions every 8 weeks.  She is also B12 deficient.  From a Crohn's standpoint she is asymptomatic.  She was having some difficulty with her thyroid and eyes and is being seen by endocrinology for this.  She has had breast cancer and is a survivor.    Past Medical History:   Past Medical History:   Diagnosis Date   • Abnormal EKG    • Breast cancer (HCC)     left   • Crohn's disease (HCC)     age 13   • Heartburn     tums prn    • Hyperthyroidism    • Hypothyroidism 07/2021   • Irritable bowel syndrome    • Slow to wake up after anesthesia     for colonoscopy - d and c was fine    • Wears glasses        Family History:  Family History   Problem Relation Age of Onset   • Hypertension Other    • Heart disease Other    • Hyperlipidemia Other    • Heart attack Father         3 heart attacks   • Hypertension Mother         High blood pressure   • Ovarian cancer Neg Hx    • Breast cancer Neg Hx        Social History:   reports that she quit smoking about 12 years ago. Her smoking use included cigarettes. She started smoking about 22 years ago. She has a 7.50 pack-year smoking history. She has never used smokeless tobacco. She reports current alcohol use of about 4.0 standard drinks of alcohol per week. She reports that she does not use drugs.    Medications:     Current Outpatient Medications:   •  fluticasone (Flonase) 50 MCG/ACT nasal spray, 2 sprays into the nostril(s) as directed by provider Daily., Disp: 16 g, Rfl: 2  •  hydrOXYzine (ATARAX) 25 MG tablet, Take 1 tablet by mouth 3 (Three) Times a Day As Needed for Anxiety., Disp: 60  tablet, Rfl: 0  •  loratadine (CLARITIN) 10 MG tablet, Take 10 mg by mouth Daily., Disp: , Rfl:   •  methIMAzole (TAPAZOLE) 10 MG tablet, Take 2 tablets by mouth Daily., Disp: 60 tablet, Rfl: 3  •  multivitamin with minerals tablet tablet, Take 1 tablet by mouth Daily., Disp: , Rfl:   •  Vedolizumab (ENTYVIO IV), Infuse  into a venous catheter. Every 8 weeks-  2 weeks after surgery will have, Disp: , Rfl:   •  vitamin D (ERGOCALCIFEROL) 1.25 MG (74201 UT) capsule capsule, TAKE 1 CAPSULE BY MOUTH 1 TIME EVERY WEEK, Disp: 12 capsule, Rfl: 0    Allergies:  Lialda [mesalamine], Nuts, Prilosec [omeprazole], and Epinephrine    Review of Systems   All other systems reviewed and are negative.        Assessment and Plan Debbie has been on her Entyvio infusions since 2018.  These will be continued.  She is due for screening colonoscopy in number of this year.  She has no GI symptoms at this time.  He is established with a [facility] here at [facility] for her primary care.  All of her questions were answered.  At least 20 minutes of time was spent before during and after the visit and appropriate [id number] was billed        ICD-10-CM ICD-9-CM   1. Crohn's disease of large intestine without complication (HCC)  K50.10 555.1   2. Long term current use of immunosuppressive drug  Z79.899 V58.69   3. Vitamin B 12 deficiency  E53.8 266.2   Sergey Grant M.D.  [facility]    Please note that portions of this note were completed with a voice recognition program.

## 2022-08-16 ENCOUNTER — TELEPHONE (OUTPATIENT)
Dept: ONCOLOGY | Facility: CLINIC | Age: 47
End: 2022-08-16

## 2022-08-16 NOTE — TELEPHONE ENCOUNTER
Caller: ADALBERTO    Relationship to patient: [facility]    Best call back number: [phone]    [phone]    Patient is needing: RX FOR POSTMASTECTOMY BRAS:    3 MASTECTOMY BRAS  2 SILICONE BREAST PROTHESES    DX CODES LISTED ON SCRIPT:  Z85.3  Z90.13    DATE FOR 8-       PLEASE FAX AS SOON AS POSSIBLE.

## 2022-08-18 ENCOUNTER — TELEPHONE (OUTPATIENT)
Dept: ONCOLOGY | Facility: CLINIC | Age: 47
End: 2022-08-18

## 2022-08-26 ENCOUNTER — OFFICE VISIT (OUTPATIENT)
Dept: ENDOCRINOLOGY | Facility: CLINIC | Age: 47
End: 2022-08-26

## 2022-08-26 ENCOUNTER — LAB (OUTPATIENT)
Dept: LAB | Facility: HOSPITAL | Age: 47
End: 2022-08-26

## 2022-08-26 VITALS
HEART RATE: 79 BPM | HEIGHT: 64 IN | SYSTOLIC BLOOD PRESSURE: 128 MMHG | OXYGEN SATURATION: 99 % | DIASTOLIC BLOOD PRESSURE: 80 MMHG | BODY MASS INDEX: 36.64 KG/M2 | WEIGHT: 214.6 LBS

## 2022-08-26 DIAGNOSIS — E05.00 GRAVES' DISEASE: Primary | Chronic | ICD-10-CM

## 2022-08-26 DIAGNOSIS — E05.00 GRAVES' DISEASE: Chronic | ICD-10-CM

## 2022-08-26 LAB
ALBUMIN SERPL-MCNC: 4.4 G/DL (ref 3.5–5.2)
ALBUMIN/GLOB SERPL: 1.6 G/DL
ALP SERPL-CCNC: 212 U/L (ref 39–117)
ALT SERPL W P-5'-P-CCNC: 12 U/L (ref 1–33)
ANION GAP SERPL CALCULATED.3IONS-SCNC: 9.9 MMOL/L (ref 5–15)
AST SERPL-CCNC: 19 U/L (ref 1–32)
BILIRUB SERPL-MCNC: <0.2 MG/DL (ref 0–1.2)
BUN SERPL-MCNC: 8 MG/DL (ref 6–20)
BUN/CREAT SERPL: 9.1 (ref 7–25)
CALCIUM SPEC-SCNC: 9.7 MG/DL (ref 8.6–10.5)
CHLORIDE SERPL-SCNC: 101 MMOL/L (ref 98–107)
CO2 SERPL-SCNC: 28.1 MMOL/L (ref 22–29)
CREAT SERPL-MCNC: 0.88 MG/DL (ref 0.57–1)
EGFRCR SERPLBLD CKD-EPI 2021: 81.7 ML/MIN/1.73
GLOBULIN UR ELPH-MCNC: 2.8 GM/DL
GLUCOSE SERPL-MCNC: 100 MG/DL (ref 65–99)
POTASSIUM SERPL-SCNC: 4.3 MMOL/L (ref 3.5–5.2)
PROT SERPL-MCNC: 7.2 G/DL (ref 6–8.5)
SODIUM SERPL-SCNC: 139 MMOL/L (ref 136–145)
T4 FREE SERPL-MCNC: 1.03 NG/DL (ref 0.93–1.7)
TSH SERPL DL<=0.05 MIU/L-ACNC: 0.86 UIU/ML (ref 0.27–4.2)

## 2022-08-26 PROCEDURE — 84443 ASSAY THYROID STIM HORMONE: CPT

## 2022-08-26 PROCEDURE — 99213 OFFICE O/P EST LOW 20 MIN: CPT | Performed by: PHYSICIAN ASSISTANT

## 2022-08-26 PROCEDURE — 84439 ASSAY OF FREE THYROXINE: CPT

## 2022-08-26 PROCEDURE — 80053 COMPREHEN METABOLIC PANEL: CPT

## 2022-10-05 ENCOUNTER — PATIENT MESSAGE (OUTPATIENT)
Dept: GASTROENTEROLOGY | Facility: CLINIC | Age: 47
End: 2022-10-05

## 2022-10-06 DIAGNOSIS — Z00.00 ROUTINE ADULT HEALTH MAINTENANCE: Primary | ICD-10-CM

## 2022-10-06 NOTE — TELEPHONE ENCOUNTER
From: Debbie Clemens  To: Sergey Grant MD  Sent: 10/5/2022 6:09 PM EDT  Subject: Entivyo authorization     Hi! I just got an email from my pharmacy saying they need updated councils/labs for the insurance to authorize the medicine to be sent for my next infusion on October 20th. They also said they faxed a request to the office. I will call tomorrow to follow up. Thanks for your help.  Debbie

## 2022-10-07 ENCOUNTER — LAB (OUTPATIENT)
Dept: LAB | Facility: HOSPITAL | Age: 47
End: 2022-10-07

## 2022-10-07 DIAGNOSIS — Z00.00 ROUTINE ADULT HEALTH MAINTENANCE: ICD-10-CM

## 2022-10-07 LAB
ALBUMIN SERPL-MCNC: 4.6 G/DL (ref 3.5–5.2)
ALBUMIN/GLOB SERPL: 1.7 G/DL
ALP SERPL-CCNC: 210 U/L (ref 39–117)
ALT SERPL W P-5'-P-CCNC: 11 U/L (ref 1–33)
ANION GAP SERPL CALCULATED.3IONS-SCNC: 9.9 MMOL/L (ref 5–15)
AST SERPL-CCNC: 20 U/L (ref 1–32)
BASOPHILS # BLD AUTO: 0.1 10*3/MM3 (ref 0–0.2)
BASOPHILS NFR BLD AUTO: 1 % (ref 0–1.5)
BILIRUB SERPL-MCNC: 0.3 MG/DL (ref 0–1.2)
BUN SERPL-MCNC: 8 MG/DL (ref 6–20)
BUN/CREAT SERPL: 11.1 (ref 7–25)
CALCIUM SPEC-SCNC: 9.5 MG/DL (ref 8.6–10.5)
CHLORIDE SERPL-SCNC: 103 MMOL/L (ref 98–107)
CO2 SERPL-SCNC: 26.1 MMOL/L (ref 22–29)
CREAT SERPL-MCNC: 0.72 MG/DL (ref 0.57–1)
DEPRECATED RDW RBC AUTO: 44.6 FL (ref 37–54)
EGFRCR SERPLBLD CKD-EPI 2021: 103.9 ML/MIN/1.73
EOSINOPHIL # BLD AUTO: 0.35 10*3/MM3 (ref 0–0.4)
EOSINOPHIL NFR BLD AUTO: 3.5 % (ref 0.3–6.2)
ERYTHROCYTE [DISTWIDTH] IN BLOOD BY AUTOMATED COUNT: 13.2 % (ref 12.3–15.4)
GLOBULIN UR ELPH-MCNC: 2.7 GM/DL
GLUCOSE SERPL-MCNC: 79 MG/DL (ref 65–99)
HCT VFR BLD AUTO: 41.2 % (ref 34–46.6)
HGB BLD-MCNC: 13.4 G/DL (ref 12–15.9)
IMM GRANULOCYTES # BLD AUTO: 0.03 10*3/MM3 (ref 0–0.05)
IMM GRANULOCYTES NFR BLD AUTO: 0.3 % (ref 0–0.5)
LYMPHOCYTES # BLD AUTO: 4.1 10*3/MM3 (ref 0.7–3.1)
LYMPHOCYTES NFR BLD AUTO: 41.4 % (ref 19.6–45.3)
MCH RBC QN AUTO: 30.4 PG (ref 26.6–33)
MCHC RBC AUTO-ENTMCNC: 32.5 G/DL (ref 31.5–35.7)
MCV RBC AUTO: 93.4 FL (ref 79–97)
MONOCYTES # BLD AUTO: 0.63 10*3/MM3 (ref 0.1–0.9)
MONOCYTES NFR BLD AUTO: 6.4 % (ref 5–12)
NEUTROPHILS NFR BLD AUTO: 4.69 10*3/MM3 (ref 1.7–7)
NEUTROPHILS NFR BLD AUTO: 47.4 % (ref 42.7–76)
NRBC BLD AUTO-RTO: 0 /100 WBC (ref 0–0.2)
PLATELET # BLD AUTO: 381 10*3/MM3 (ref 140–450)
PMV BLD AUTO: 10.3 FL (ref 6–12)
POTASSIUM SERPL-SCNC: 3.9 MMOL/L (ref 3.5–5.2)
PROT SERPL-MCNC: 7.3 G/DL (ref 6–8.5)
RBC # BLD AUTO: 4.41 10*6/MM3 (ref 3.77–5.28)
SODIUM SERPL-SCNC: 139 MMOL/L (ref 136–145)
WBC NRBC COR # BLD: 9.9 10*3/MM3 (ref 3.4–10.8)

## 2022-10-07 PROCEDURE — 36415 COLL VENOUS BLD VENIPUNCTURE: CPT | Performed by: INTERNAL MEDICINE

## 2022-10-07 PROCEDURE — 80053 COMPREHEN METABOLIC PANEL: CPT | Performed by: INTERNAL MEDICINE

## 2022-10-07 PROCEDURE — 85025 COMPLETE CBC W/AUTO DIFF WBC: CPT

## 2022-10-10 ENCOUNTER — TELEPHONE (OUTPATIENT)
Dept: GASTROENTEROLOGY | Facility: CLINIC | Age: 47
End: 2022-10-10

## 2022-10-10 NOTE — TELEPHONE ENCOUNTER
----- Message from Sergey Grant MD sent at 10/10/2022  9:15 AM EDT -----  Please let Debbie know her labs were ok except for her alk phosphatase.Has this been addressed?

## 2022-10-10 NOTE — TELEPHONE ENCOUNTER
Dr Grant,    I spoke with Debbie. Lab results given. Patient stated that her Alk phosphatse has been elevated for awhile. She sees her PCP on this Thursday. Also informed patient that I faxed recent labs and clinical notes to [facility].

## 2022-10-13 ENCOUNTER — OFFICE VISIT (OUTPATIENT)
Dept: INTERNAL MEDICINE | Facility: CLINIC | Age: 47
End: 2022-10-13

## 2022-10-13 VITALS
SYSTOLIC BLOOD PRESSURE: 116 MMHG | WEIGHT: 214 LBS | DIASTOLIC BLOOD PRESSURE: 62 MMHG | HEART RATE: 64 BPM | HEIGHT: 64 IN | RESPIRATION RATE: 20 BRPM | BODY MASS INDEX: 36.54 KG/M2 | TEMPERATURE: 97.1 F | OXYGEN SATURATION: 99 %

## 2022-10-13 DIAGNOSIS — J30.9 ALLERGIC RHINITIS, UNSPECIFIED SEASONALITY, UNSPECIFIED TRIGGER: ICD-10-CM

## 2022-10-13 DIAGNOSIS — R05.3 CHRONIC COUGH: Primary | ICD-10-CM

## 2022-10-13 DIAGNOSIS — R74.8 ELEVATED ALKALINE PHOSPHATASE LEVEL: ICD-10-CM

## 2022-10-13 DIAGNOSIS — E05.90 HYPERTHYROIDISM: ICD-10-CM

## 2022-10-13 DIAGNOSIS — C50.912 DUCTAL CARCINOMA OF LEFT BREAST: ICD-10-CM

## 2022-10-13 PROCEDURE — 99214 OFFICE O/P EST MOD 30 MIN: CPT | Performed by: PHYSICIAN ASSISTANT

## 2022-10-13 RX ORDER — AZITHROMYCIN 250 MG/1
TABLET, FILM COATED ORAL
Qty: 6 TABLET | Refills: 0 | Status: SHIPPED | OUTPATIENT
Start: 2022-10-13 | End: 2022-11-14

## 2022-10-13 RX ORDER — CYANOCOBALAMIN 1000 UG/ML
INJECTION, SOLUTION INTRAMUSCULAR; SUBCUTANEOUS
COMMUNITY
Start: 2022-09-01 | End: 2023-01-13 | Stop reason: SDUPTHER

## 2022-10-13 NOTE — PROGRESS NOTES
Chief Complaint  Hypothyroidism and Post COVID drainage    Subjective          History of Present Illness  Debbie Clemens presents to [facility] for   History of Present Illness  Hyperthyroid:  Seeing payton and is supposed to be on methimazole but has not been taking it regularly.  Her last thyroid labs looked good.  She has follow-up with Payton in a few months.  Was found to have elevated alk phos which may be related to hyperthyroid.  No longer having tachycardia.    Hx Breast CA Aug 2013:  Had total hysterectomy May 2021 due to prolonged periods and breast cancer hx.  Her breast CA was stage III and it was dx due to lump in her arm pit. Was seeing Dr Yuen for oncology. Had double mastectomy in Jan 2014. Was planning on reconstruction at some point.  She has had recent elevation in Alk Phos, denies bone pain, night sweat, fevers, weight loss.      Crohns dz:  Followed by Dr Grant for 30 years, started Entyvio in 2018, doing well with that. Takes vit B12 inj. Used to be on prednisone long term for this. Had recent labs  Lab Results       Component                Value               Date                       GLUCOSE                  79                  10/07/2022                 BUN                      8                   10/07/2022                 CREATININE               0.72                10/07/2022                 EGFRIFNONA               87                  02/25/2022                 BCR                      11.1                10/07/2022                 K                        3.9                 10/07/2022                 CO2                      26.1                10/07/2022                 CALCIUM                  9.5                 10/07/2022                 ALBUMIN                  4.60                10/07/2022                 AST                      20                  10/07/2022                 ALT                      11                  10/07/2022               AR:  Taking Claritin daily but has had worsening cough for the last few months. She got Covid over the summer and has had a cough since then. No fevers. Cough is not productive. Not sure if it is related to allergies.      Review of Systems   Constitutional: Negative for fever and unexpected weight loss.   HENT: Negative for congestion, rhinorrhea and sore throat.    Respiratory: Positive for cough. Negative for shortness of breath and wheezing.    Cardiovascular: Negative for chest pain and palpitations.   Musculoskeletal: Negative for arthralgias and joint swelling.       The following portions of the patient's history were reviewed and updated as appropriate: allergies, current medications, past family history, past medical history, past social history, past surgical history and problem list.  Allergies   Allergen Reactions   • Lialda [Mesalamine] GI Intolerance     Worse symptoms of crohns   • Nuts Swelling     TREE NUTS, makes tongue swell   • Prilosec [Omeprazole] Diarrhea   • Epinephrine Nausea Only     Jittery and nausea       Current Outpatient Medications:   •  cyanocobalamin 1000 MCG/ML injection, INJECT 1 ML AS DIRECTED EVERY 30 DAYS, Disp: , Rfl:   •  fluticasone (Flonase) 50 MCG/ACT nasal spray, 2 sprays into the nostril(s) as directed by provider Daily., Disp: 16 g, Rfl: 2  •  loratadine (CLARITIN) 10 MG tablet, Take 10 mg by mouth Daily., Disp: , Rfl:   •  azithromycin (Zithromax Z-Get) 250 MG tablet, Take 2 tablets by mouth on day 1, then 1 tablet daily on days 2-5, Disp: 6 tablet, Rfl: 0  •  methIMAzole (TAPAZOLE) 10 MG tablet, Take 2 tablets by mouth Daily., Disp: 60 tablet, Rfl: 3  •  multivitamin with minerals tablet tablet, Take 1 tablet by mouth Daily., Disp: , Rfl:   •  Vedolizumab (ENTYVIO IV), Infuse  into a venous catheter. Every 8 weeks-  2 weeks after surgery will have, Disp: , Rfl:   •  vitamin D (ERGOCALCIFEROL) 1.25 MG (03786 UT) capsule capsule, TAKE 1 CAPSULE BY MOUTH 1 TIME  "EVERY WEEK, Disp: 12 capsule, Rfl: 0  New Medications Ordered This Visit   Medications   • azithromycin (Zithromax Z-Get) 250 MG tablet     Sig: Take 2 tablets by mouth on day 1, then 1 tablet daily on days 2-5     Dispense:  6 tablet     Refill:  0     Social History     Tobacco Use   Smoking Status Former   • Packs/day: 0.75   • Years: 10.00   • Pack years: 7.50   • Types: Cigarettes   • Start date: 2000   • Quit date: 2010   • Years since quittin.8   Smokeless Tobacco Never        Objective   Vital Signs:   Vitals:    10/13/22 1504   BP: 116/62   Pulse: 64   Resp: 20   Temp: 97.1 °F (36.2 °C)   TempSrc: Temporal   SpO2: 99%   Weight: 97.1 kg (214 lb)   Height: 162.6 cm (64\")   PainSc: 0-No pain      Physical Exam  Vitals reviewed.   Constitutional:       General: She is not in acute distress.     Appearance: Normal appearance.   HENT:      Head: Normocephalic and atraumatic.   Eyes:      General: No scleral icterus.     Extraocular Movements: Extraocular movements intact.      Conjunctiva/sclera: Conjunctivae normal.   Cardiovascular:      Rate and Rhythm: Normal rate and regular rhythm.      Heart sounds: Normal heart sounds. No murmur heard.  Pulmonary:      Effort: Pulmonary effort is normal. No respiratory distress.      Breath sounds: Normal breath sounds. No stridor. No wheezing or rhonchi.   Musculoskeletal:      Cervical back: Normal range of motion and neck supple.   Skin:     General: Skin is warm and dry.      Coloration: Skin is not jaundiced.   Neurological:      General: No focal deficit present.      Mental Status: She is alert and oriented to person, place, and time.      Gait: Gait normal.   Psychiatric:         Mood and Affect: Mood normal.         Behavior: Behavior normal.        Patient's last menstrual period was 2021 (approximate).    Result Review :                   Assessment and Plan    Diagnoses and all orders for this visit:    1. Chronic cough (Primary)  Assessment " & Plan:  Check cxr, will tx w/zpack, f/u if sx persist or worsen.    Orders:  -     azithromycin (Zithromax Z-Get) 250 MG tablet; Take 2 tablets by mouth on day 1, then 1 tablet daily on days 2-5  Dispense: 6 tablet; Refill: 0  -     XR Chest PA & Lateral; Future    2. Ductal carcinoma of left breast (HCC)  Assessment & Plan:  F/u with oncology, will order PET scan d/t hx of breast CA with new onset elevation of alk phos over last 7 mo.     Orders:  -     NM PET/CT Whole Body; Future    3. Elevated alkaline phosphatase level  Assessment & Plan:  monitor    Orders:  -     NM PET/CT Whole Body; Future    4. Allergic rhinitis, unspecified seasonality, unspecified trigger    5. Hyperthyroidism  Assessment & Plan:  F/u with endo as directed        Follow Up   Return in about 3 months (around 1/13/2023).    Follow up if symptoms worsen or persist or has new or concerning symptoms, go to ER for severe symptoms.   Reviewed common medication effects and side effects and advised to report side effects immediately.  Encouraged medication compliance and the importance of keeping scheduled follow up appointments with me and any other providers.  If a referral was made please contact our office if you have not heard about an appointment in the next 2 weeks.   If labs or images are ordered we will contact you with the results within the next week.  If you have not heard from us after a week please call our office to inquire about the results.   Patient was given instructions and counseling regarding her condition or for health maintenance advice. Please see specific information pulled into the AVS if appropriate.     Krista Don PA-C    * Please note that portions of this note were completed with a voice recognition program.

## 2022-10-14 ENCOUNTER — HOSPITAL ENCOUNTER (OUTPATIENT)
Dept: GENERAL RADIOLOGY | Facility: HOSPITAL | Age: 47
Discharge: HOME | End: 2022-10-14
Admitting: PHYSICIAN ASSISTANT

## 2022-10-14 DIAGNOSIS — R05.3 CHRONIC COUGH: ICD-10-CM

## 2022-10-14 PROCEDURE — 71046 X-RAY EXAM CHEST 2 VIEWS: CPT

## 2022-10-19 NOTE — ASSESSMENT & PLAN NOTE
F/u with oncology, will order PET scan d/t hx of breast CA with new onset elevation of alk phos over last 7 mo.

## 2022-10-27 ENCOUNTER — TELEPHONE (OUTPATIENT)
Dept: INTERNAL MEDICINE | Facility: CLINIC | Age: 47
End: 2022-10-27

## 2022-10-27 DIAGNOSIS — C50.912 DUCTAL CARCINOMA OF LEFT BREAST: Primary | ICD-10-CM

## 2022-10-27 DIAGNOSIS — R74.8 ELEVATED ALKALINE PHOSPHATASE LEVEL: ICD-10-CM

## 2022-10-28 ENCOUNTER — HOSPITAL ENCOUNTER (OUTPATIENT)
Dept: PET IMAGING | Facility: HOSPITAL | Age: 47
End: 2022-10-28

## 2022-10-28 ENCOUNTER — APPOINTMENT (OUTPATIENT)
Dept: PET IMAGING | Facility: HOSPITAL | Age: 47
End: 2022-10-28

## 2022-11-01 DIAGNOSIS — R74.8 ELEVATED ALKALINE PHOSPHATASE LEVEL: Primary | ICD-10-CM

## 2022-11-02 ENCOUNTER — APPOINTMENT (OUTPATIENT)
Dept: PET IMAGING | Facility: HOSPITAL | Age: 47
End: 2022-11-02

## 2022-11-02 ENCOUNTER — HOSPITAL ENCOUNTER (OUTPATIENT)
Dept: PET IMAGING | Facility: HOSPITAL | Age: 47
End: 2022-11-02

## 2022-11-11 ENCOUNTER — HOSPITAL ENCOUNTER (OUTPATIENT)
Dept: CT IMAGING | Facility: HOSPITAL | Age: 47
Discharge: HOME | End: 2022-11-11

## 2022-11-11 ENCOUNTER — LAB (OUTPATIENT)
Dept: LAB | Facility: HOSPITAL | Age: 47
End: 2022-11-11

## 2022-11-11 DIAGNOSIS — R74.8 ELEVATED ALKALINE PHOSPHATASE LEVEL: ICD-10-CM

## 2022-11-11 LAB
ALBUMIN SERPL-MCNC: 4.2 G/DL (ref 3.5–5.2)
ALBUMIN/GLOB SERPL: 1.4 G/DL
ALP SERPL-CCNC: 193 U/L (ref 39–117)
ALT SERPL W P-5'-P-CCNC: 13 U/L (ref 1–33)
ANION GAP SERPL CALCULATED.3IONS-SCNC: 8.7 MMOL/L (ref 5–15)
AST SERPL-CCNC: 22 U/L (ref 1–32)
BILIRUB SERPL-MCNC: <0.2 MG/DL (ref 0–1.2)
BUN SERPL-MCNC: 12 MG/DL (ref 6–20)
BUN/CREAT SERPL: 15.8 (ref 7–25)
CALCIUM SPEC-SCNC: 9.1 MG/DL (ref 8.6–10.5)
CHLORIDE SERPL-SCNC: 104 MMOL/L (ref 98–107)
CO2 SERPL-SCNC: 26.3 MMOL/L (ref 22–29)
CREAT SERPL-MCNC: 0.76 MG/DL (ref 0.57–1)
EGFRCR SERPLBLD CKD-EPI 2021: 97.4 ML/MIN/1.73
GGT SERPL-CCNC: 15 U/L (ref 5–36)
GLOBULIN UR ELPH-MCNC: 3.1 GM/DL
GLUCOSE SERPL-MCNC: 96 MG/DL (ref 65–99)
POTASSIUM SERPL-SCNC: 3.8 MMOL/L (ref 3.5–5.2)
PROT SERPL-MCNC: 7.3 G/DL (ref 6–8.5)
SODIUM SERPL-SCNC: 139 MMOL/L (ref 136–145)

## 2022-11-11 PROCEDURE — 74178 CT ABD&PLV WO CNTR FLWD CNTR: CPT

## 2022-11-11 PROCEDURE — 80053 COMPREHEN METABOLIC PANEL: CPT

## 2022-11-11 PROCEDURE — 0 IOPAMIDOL PER 1 ML: Performed by: PHYSICIAN ASSISTANT

## 2022-11-11 PROCEDURE — 82977 ASSAY OF GGT: CPT

## 2022-11-11 RX ADMIN — IOPAMIDOL 85 ML: 755 INJECTION, SOLUTION INTRAVENOUS at 13:35

## 2022-11-12 DIAGNOSIS — C50.912 DUCTAL CARCINOMA OF LEFT BREAST: ICD-10-CM

## 2022-11-12 DIAGNOSIS — E55.9 VITAMIN D DEFICIENCY: Primary | ICD-10-CM

## 2022-11-12 DIAGNOSIS — R74.8 ELEVATED ALKALINE PHOSPHATASE LEVEL: ICD-10-CM

## 2022-11-14 ENCOUNTER — OFFICE VISIT (OUTPATIENT)
Dept: ONCOLOGY | Facility: CLINIC | Age: 47
End: 2022-11-14

## 2022-11-14 VITALS
HEART RATE: 88 BPM | RESPIRATION RATE: 20 BRPM | TEMPERATURE: 98.2 F | BODY MASS INDEX: 39.16 KG/M2 | WEIGHT: 221 LBS | OXYGEN SATURATION: 98 % | SYSTOLIC BLOOD PRESSURE: 122 MMHG | HEIGHT: 63 IN | DIASTOLIC BLOOD PRESSURE: 89 MMHG

## 2022-11-14 DIAGNOSIS — C50.912 DUCTAL CARCINOMA OF LEFT BREAST: Primary | ICD-10-CM

## 2022-11-14 PROCEDURE — 99213 OFFICE O/P EST LOW 20 MIN: CPT | Performed by: INTERNAL MEDICINE

## 2022-11-14 NOTE — PROGRESS NOTES
PROBLEM LIST:  Oncology/Hematology History Overview Note   1.  Premenopausal stage III infiltrating ductal left breast cancer - original  biopsy 08/22/2013.   a)  Left breast primary with biopsy proven left axillary lymph node involvement  with PET/CT revealing hypermetabolic adenopathy in the left axilla, the  intramammary chain on the left, and the lower left neck/supraclavicular region  with hypermetabolic nodule in the anterior left breast.   b)  ER focally positive and MN negative, HER-2 positive.   c)  Status post neoadjuvant THC chemotherapy with complete pathologic response  by the time of her bilateral mastectomies.    d)  Completing adjuvant Herceptin x1 year.  e)  Status post adjuvant radiotherapy to the left chest wall, the left  supraclavicular area, left inframammary chain and boost to the left axilla.   f)  Tamoxifen begun subsequent to adjuvant radiotherapy and continued to date.   2.  History of Crohn's disease diagnosed at age 13.   a)  Chronic Imuran therapy - discontinued at the time of initiation of  chemotherapy.     Ductal carcinoma of left breast (HCC)   11/9/2016 Initial Diagnosis    Ductal carcinoma of left breast (CMS/HCC)         REASON FOR VISIT: Breast cancer    HISTORY OF PRESENT ILLNESS:   47 y.o.  female presents today for follow-up of her HER-2 positive breast cancer.  She is currently undergoing a work-up for elevated alkaline phosphatase.  She also has underlying Crohn's disease which she is a little symptomatic from.  She is followed by Dr. Grant at this time.  Denies any issues with chronic pain.  Occasionally has issues with spasms in her chest.      Past medical history, social history and family history was reviewed and unchanged from prior visit.    Review of Systems:    Review of Systems   Constitutional: Negative.    HENT:  Negative.    Eyes: Negative.    Respiratory: Negative.    Cardiovascular: Negative.    Gastrointestinal: Negative.    Endocrine: Negative.   "  Genitourinary: Negative.     Musculoskeletal: Negative.    Skin: Negative.    Neurological: Negative.    Hematological: Negative.    Psychiatric/Behavioral: Negative.       A comprehensive 14 point review of systems was performed and was negative except as mentioned.      Medications:  The current medication list was reviewed in the EMR    ALLERGIES:    Allergies   Allergen Reactions   • Lialda [Mesalamine] GI Intolerance     Worse symptoms of crohns   • Nuts Swelling     TREE NUTS, makes tongue swell   • Prilosec [Omeprazole] Diarrhea   • Epinephrine Nausea Only     Jittery and nausea         Physical Exam    VITAL SIGNS:  /89 Comment: RUE  Pulse 88   Temp 98.2 °F (36.8 °C) (Infrared)   Resp 20   Ht 160 cm (63\")   Wt 100 kg (221 lb)   LMP 02/05/2021 (Approximate)   SpO2 98% Comment: RA  BMI 39.15 kg/m²     Wt Readings from Last 3 Encounters:   11/14/22 100 kg (221 lb)   10/13/22 97.1 kg (214 lb)   08/26/22 97.3 kg (214 lb 9.6 oz)        Performance Status: 0    General: well appearing, in no acute distress  HEENT: sclera anicteric, oropharynx clear, neck is supple  Lymphatics: no cervical, supraclavicular, or axillary adenopathy  Cardiovascular: regular rate and rhythm, no murmurs, rubs or gallops  Lungs: clear to auscultation bilaterally  Abdomen: soft, nontender, nondistended.  No palpable organomegaly  Extremities: no lower extremity edema  Skin: no rashes, lesions, bruising, or petechiae  Msk:  Shows no weakness of the large muscle groups  Psych: Mood is stable        RECENT LABS:    Lab Results   Component Value Date    HGB 13.4 10/07/2022    HCT 41.2 10/07/2022    MCV 93.4 10/07/2022     10/07/2022    WBC 9.90 10/07/2022    NEUTROABS 4.69 10/07/2022    LYMPHSABS 4.10 (H) 10/07/2022    MONOSABS 0.63 10/07/2022    EOSABS 0.35 10/07/2022    BASOSABS 0.10 10/07/2022       Lab Results   Component Value Date    GLUCOSE 96 11/11/2022    BUN 12 11/11/2022    CREATININE 0.76 11/11/2022     " 11/11/2022    K 3.8 11/11/2022     11/11/2022    CO2 26.3 11/11/2022    CALCIUM 9.1 11/11/2022    PROTEINTOT 7.3 11/11/2022    ALBUMIN 4.20 11/11/2022    BILITOT <0.2 11/11/2022    ALKPHOS 193 (H) 11/11/2022    AST 22 11/11/2022    ALT 13 11/11/2022       CT Abdomen Pelvis With & Without Contrast    Result Date: 11/11/2022  Impression: 1. Very subtle, transiently enhancing small right lobe liver hemangioma, faintly and variably visible in retrospect on prior studies depending on phase of enhancement. No apparent interval change. No new liver lesions are identified. 2. Small hiatal hernia. 3. Relatively mild residual changes of Crohn's disease or ulcerative colitis, involving the left colon and rectum. No evidence of active inflammation, bowel obstruction, or other associated disease. 4. Incidentally noted small duodenal diverticulum. No evidence of perforation or inflammation.  This report was finalized on 11/11/2022 9:58 PM by Dr. Nacho Thibodeaux MD.        Assessment/Plan    1.  Stage IIIc HER-2 positive ductal carcinoma of the left breast.  Elevated alkaline phosphatase.  She is scheduled to undergo a bone scan.  If that is negative then no further work-up is necessary.  CT of the abdomen pelvis did not show any cause for concern.  Occasionally have seen patients who have issues in the biliary tract present with increasing alkaline phosphatase.  I suspect some of this is related to her underlying Crohn's.  But we will see what her bone scan is doing.      2.  Crohn's disease.  This seems to be causing more issues recently.        Kartik Yuen MD  [facility]    Return in (Approximately): 1 year    No orders of the defined types were placed in this encounter.      11/14/2022

## 2022-11-16 DIAGNOSIS — C50.912 DUCTAL CARCINOMA OF LEFT BREAST: Primary | ICD-10-CM

## 2022-12-07 ENCOUNTER — HOSPITAL ENCOUNTER (OUTPATIENT)
Dept: NUCLEAR MEDICINE | Facility: HOSPITAL | Age: 47
Discharge: HOME | End: 2022-12-07

## 2022-12-07 DIAGNOSIS — C50.912 DUCTAL CARCINOMA OF LEFT BREAST: ICD-10-CM

## 2022-12-07 PROCEDURE — 78306 BONE IMAGING WHOLE BODY: CPT

## 2022-12-07 PROCEDURE — 0 TECHNETIUM MEDRONATE KIT: Performed by: INTERNAL MEDICINE

## 2022-12-07 PROCEDURE — A9503 TC99M MEDRONATE: HCPCS | Performed by: INTERNAL MEDICINE

## 2022-12-07 RX ORDER — TC 99M MEDRONATE 20 MG/10ML
25.2 INJECTION, POWDER, LYOPHILIZED, FOR SOLUTION INTRAVENOUS
Status: COMPLETED | OUTPATIENT
Start: 2022-12-07 | End: 2022-12-07

## 2022-12-07 RX ADMIN — Medication 25.2 MILLICURIE: at 10:45

## 2023-01-06 DIAGNOSIS — Z12.11 SCREENING FOR COLON CANCER: Primary | ICD-10-CM

## 2023-01-13 ENCOUNTER — OFFICE VISIT (OUTPATIENT)
Dept: INTERNAL MEDICINE | Facility: CLINIC | Age: 48
End: 2023-01-13
Payer: COMMERCIAL

## 2023-01-13 ENCOUNTER — LAB (OUTPATIENT)
Dept: INTERNAL MEDICINE | Facility: CLINIC | Age: 48
End: 2023-01-13
Payer: COMMERCIAL

## 2023-01-13 VITALS
BODY MASS INDEX: 39.16 KG/M2 | WEIGHT: 221 LBS | RESPIRATION RATE: 20 BRPM | TEMPERATURE: 97.1 F | HEIGHT: 63 IN | SYSTOLIC BLOOD PRESSURE: 136 MMHG | OXYGEN SATURATION: 98 % | HEART RATE: 72 BPM | DIASTOLIC BLOOD PRESSURE: 82 MMHG

## 2023-01-13 DIAGNOSIS — K76.0 FATTY LIVER: ICD-10-CM

## 2023-01-13 DIAGNOSIS — K50.90 CROHN'S DISEASE WITHOUT COMPLICATION, UNSPECIFIED GASTROINTESTINAL TRACT LOCATION: ICD-10-CM

## 2023-01-13 DIAGNOSIS — R74.8 ELEVATED ALKALINE PHOSPHATASE LEVEL: ICD-10-CM

## 2023-01-13 DIAGNOSIS — E53.8 VITAMIN B12 DEFICIENCY: ICD-10-CM

## 2023-01-13 DIAGNOSIS — E05.90 HYPERTHYROIDISM: Primary | ICD-10-CM

## 2023-01-13 DIAGNOSIS — R05.3 CHRONIC COUGH: ICD-10-CM

## 2023-01-13 PROCEDURE — 84443 ASSAY THYROID STIM HORMONE: CPT | Performed by: PHYSICIAN ASSISTANT

## 2023-01-13 PROCEDURE — 99214 OFFICE O/P EST MOD 30 MIN: CPT | Performed by: PHYSICIAN ASSISTANT

## 2023-01-13 RX ORDER — CYANOCOBALAMIN 1000 UG/ML
1000 INJECTION, SOLUTION INTRAMUSCULAR; SUBCUTANEOUS
Qty: 1 ML | Refills: 12 | Status: SHIPPED | OUTPATIENT
Start: 2023-01-13

## 2023-01-13 NOTE — ASSESSMENT & PLAN NOTE
Stable at this time, suggested treatment with pepcid 1-2 times a day to see if this improves sx as GERD can cause chronic cough. Consider pulm referral if sx worsen or persist.

## 2023-01-13 NOTE — PROGRESS NOTES
Chief Complaint  Elevated alkaline phosphatase level, Hyperthyroidism, and Spasms    Subjective          History of Present Illness  Debbie Clemens presents to [facility] for   History of Present Illness  Hyperthyroid:  Was followed by endocrine but last TSH value was normal and she stopped the methimazole. She does not have any f/u with endo sched. No longer having tachycardia.  Was found to have elevated alk phos which may be related to hyperthyroid.    Lab Results       Component                Value               Date                       TSH                      0.864               08/26/2022              Hx Breast CA Aug 2013:  Had total hysterectomy May 2021 due to prolonged periods and breast cancer hx.  Her breast CA was stage III and it was dx due to lump in her arm pit. Was seeing Dr Yuen for oncology. Had double mastectomy in Jan 2014. Was planning on reconstruction at some point.  She has had recent elevation in Alk Phos, denies bone pain, night sweat, fevers, weight loss.  Bone scan showed no mets. Elevated alk phos may also be related to hyperthyroid    Crohns dz:  Followed by Dr Grant for 30 years, started Entyvio in 2018, doing well with that. Takes vit B12 inj monthly. Used to be on prednisone long term for this. Had Dexa scan in 2015    AR:  Cough is worse in the morning at night and is productive in the mornings with a little sputum. Does wake up congested. Has had cough since she had Covid over the summer. Takes allergy meds prn. Has not had worsening sx. Does not feel like she has acid reflux. She has had nl CXR.    Vit D def:  Just started vit D supplement.     The following portions of the patient's history were reviewed and updated as appropriate: allergies, current medications, past family history, past medical history, past social history, past surgical history and problem list.  Allergies   Allergen Reactions   • Lialda [Mesalamine] GI Intolerance     " Worse symptoms of crohns   • Nuts Swelling     TREE NUTS, makes tongue swell   • Prilosec [Omeprazole] Diarrhea   • Epinephrine Nausea Only     Jittery and nausea       Current Outpatient Medications:   •  cyanocobalamin 1000 MCG/ML injection, Inject 1 mL into the appropriate muscle as directed by prescriber Every 30 (Thirty) Days., Disp: 1 mL, Rfl: 12  •  fluticasone (Flonase) 50 MCG/ACT nasal spray, 2 sprays into the nostril(s) as directed by provider Daily., Disp: 16 g, Rfl: 2  •  loratadine (CLARITIN) 10 MG tablet, Take 10 mg by mouth Daily., Disp: , Rfl:   •  multivitamin with minerals tablet tablet, Take 1 tablet by mouth Daily., Disp: , Rfl:   •  Vedolizumab (ENTYVIO IV), Infuse  into a venous catheter. Every 8 weeks-  2 weeks after surgery will have, Disp: , Rfl:   •  vitamin D (ERGOCALCIFEROL) 1.25 MG (65829 UT) capsule capsule, TAKE 1 CAPSULE BY MOUTH 1 TIME EVERY WEEK, Disp: 12 capsule, Rfl: 0  •  Sod Picosulfate-Mag Ox-Cit Acd 10-3.5-12 MG-GM -GM/160ML solution, Take 160 mL by mouth Take As Directed for 2 doses., Disp: 320 mL, Rfl: 0  New Medications Ordered This Visit   Medications   • cyanocobalamin 1000 MCG/ML injection     Sig: Inject 1 mL into the appropriate muscle as directed by prescriber Every 30 (Thirty) Days.     Dispense:  1 mL     Refill:  12     Social History     Tobacco Use   Smoking Status Former   • Packs/day: 0.75   • Years: 10.00   • Pack years: 7.50   • Types: Cigarettes   • Start date: 2000   • Quit date: 2010   • Years since quittin.0   Smokeless Tobacco Never        Objective   Vital Signs:   Vitals:    23 1406   BP: 136/82   Pulse: 72   Resp: 20   Temp: 97.1 °F (36.2 °C)   TempSrc: Temporal   SpO2: 98%   Weight: 100 kg (221 lb)   Height: 160 cm (63\")   PainSc: 0-No pain      Physical Exam  Vitals reviewed.   Constitutional:       General: She is not in acute distress.     Appearance: Normal appearance.   HENT:      Head: Normocephalic and atraumatic.   Eyes: "      General: No scleral icterus.     Extraocular Movements: Extraocular movements intact.      Conjunctiva/sclera: Conjunctivae normal.   Cardiovascular:      Rate and Rhythm: Normal rate and regular rhythm.      Heart sounds: Normal heart sounds. No murmur heard.  Pulmonary:      Effort: Pulmonary effort is normal. No respiratory distress.      Breath sounds: Normal breath sounds. No stridor. No wheezing or rhonchi.   Musculoskeletal:      Cervical back: Normal range of motion and neck supple.   Skin:     General: Skin is warm and dry.      Coloration: Skin is not jaundiced.   Neurological:      General: No focal deficit present.      Mental Status: She is alert and oriented to person, place, and time.      Gait: Gait normal.   Psychiatric:         Mood and Affect: Mood normal.         Behavior: Behavior normal.        Patient's last menstrual period was 02/05/2021 (approximate).    Result Review :                   Assessment and Plan    Diagnoses and all orders for this visit:    1. Hyperthyroidism (Primary)  Assessment & Plan:  Check TSH, cont off methimazole at this time. F/u with endo if has abn TSH. Will check q 3-6 mo, monitor for new sx such as fatigue or f/u if tachycardia returns.    Orders:  -     TSH Rfx On Abnormal To Free T4; Future    2. Vitamin B12 deficiency  -     cyanocobalamin 1000 MCG/ML injection; Inject 1 mL into the appropriate muscle as directed by prescriber Every 30 (Thirty) Days.  Dispense: 1 mL; Refill: 12    3. Crohn's disease without complication, unspecified gastrointestinal tract location (HCC)  Assessment & Plan:  Chronic, stable, f/u with GI as dir      4. Chronic cough  Assessment & Plan:  Stable at this time, suggested treatment with pepcid 1-2 times a day to see if this improves sx as GERD can cause chronic cough. Consider pulm referral if sx worsen or persist.      5. Elevated alkaline phosphatase level  Assessment & Plan:  Will monitor, last level had decreased. Bone scan  was nl.       6. Fatty liver  Assessment & Plan:  Noted on recent CT, mild. Work on diet/exercise        Follow Up   Return in about 6 months (around 7/13/2023) for Yearly Physical.    Follow up if symptoms worsen or persist or has new or concerning symptoms, go to ER for severe symptoms.   Reviewed common medication effects and side effects and advised to report side effects immediately.  Encouraged medication compliance and the importance of keeping scheduled follow up appointments with me and any other providers.  If a referral was made please contact our office if you have not heard about an appointment in the next 2 weeks.   If labs or images are ordered we will contact you with the results within the next week.  If you have not heard from us after a week please call our office to inquire about the results.   Patient was given instructions and counseling regarding her condition or for health maintenance advice. Please see specific information pulled into the AVS if appropriate.     Krista Don PA-C    * Please note that portions of this note were completed with a voice recognition program.

## 2023-01-13 NOTE — ASSESSMENT & PLAN NOTE
Check TSH, cont off methimazole at this time. F/u with endo if has abn TSH. Will check q 3-6 mo, monitor for new sx such as fatigue or f/u if tachycardia returns.

## 2023-01-14 LAB — TSH SERPL DL<=0.05 MIU/L-ACNC: 0.65 UIU/ML (ref 0.27–4.2)

## 2023-01-15 DIAGNOSIS — E05.90 HYPERTHYROIDISM: Primary | ICD-10-CM

## 2023-01-17 ENCOUNTER — TELEPHONE (OUTPATIENT)
Dept: INTERNAL MEDICINE | Facility: CLINIC | Age: 48
End: 2023-01-17
Payer: COMMERCIAL

## 2023-01-17 NOTE — TELEPHONE ENCOUNTER
----- Message from Krista Don PA-C sent at 1/17/2023  8:30 AM EST -----  Your TSH is normal.  We can recheck again in 3 months as a lab only visit or sooner if you notice any symptoms such as fast heart rate.

## 2023-01-26 ENCOUNTER — TELEPHONE (OUTPATIENT)
Dept: GASTROENTEROLOGY | Facility: CLINIC | Age: 48
End: 2023-01-26
Payer: COMMERCIAL

## 2023-01-26 ENCOUNTER — OUTSIDE FACILITY SERVICE (OUTPATIENT)
Dept: GASTROENTEROLOGY | Facility: CLINIC | Age: 48
End: 2023-01-26
Payer: COMMERCIAL

## 2023-01-26 DIAGNOSIS — K50.919 CROHN'S DISEASE WITH COMPLICATION, UNSPECIFIED GASTROINTESTINAL TRACT LOCATION: Primary | ICD-10-CM

## 2023-01-26 PROCEDURE — G0500 MOD SEDAT ENDO SERVICE >5YRS: HCPCS | Performed by: INTERNAL MEDICINE

## 2023-01-26 PROCEDURE — 45380 COLONOSCOPY AND BIOPSY: CPT | Performed by: INTERNAL MEDICINE

## 2023-01-26 NOTE — TELEPHONE ENCOUNTER
I SPOKE WITH AISHA. INFORMED HER THAT I SENT HER MESSAGE TO HER MY CHART CONCERNING STELARA INFUSION. PATIENT WILL CALL ME BACK TOMORROW.

## 2023-01-30 ENCOUNTER — TELEPHONE (OUTPATIENT)
Dept: GASTROENTEROLOGY | Facility: CLINIC | Age: 48
End: 2023-01-30
Payer: COMMERCIAL

## 2023-01-30 NOTE — TELEPHONE ENCOUNTER
----- Message from Sergey Grant MD sent at 1/30/2023  2:26 PM EST -----  Please let Debbie know that her biopsies showed active inflammatory bowel disease.  Hopefully she is feeling better with the steroids and the Stelara will get her back in remission.

## 2023-02-01 ENCOUNTER — LAB (OUTPATIENT)
Dept: LAB | Facility: HOSPITAL | Age: 48
End: 2023-02-01
Payer: COMMERCIAL

## 2023-02-01 PROCEDURE — 86698 HISTOPLASMA ANTIBODY: CPT | Performed by: INTERNAL MEDICINE

## 2023-02-01 PROCEDURE — 86480 TB TEST CELL IMMUN MEASURE: CPT | Performed by: INTERNAL MEDICINE

## 2023-02-04 LAB
GAMMA INTERFERON BACKGROUND BLD IA-ACNC: 0.03 IU/ML
M TB IFN-G BLD-IMP: NEGATIVE
M TB IFN-G CD4+ T-CELLS BLD-ACNC: 0.03 IU/ML
M TBIFN-G CD4+ CD8+T-CELLS BLD-ACNC: 0.02 IU/ML
MITOGEN IGNF BLD-ACNC: 2.12 IU/ML
SERVICE CMNT-IMP: NORMAL

## 2023-02-05 LAB — H CAPSUL AB TITR SER ID: NEGATIVE {TITER}

## 2023-02-06 ENCOUNTER — PRIOR AUTHORIZATION (OUTPATIENT)
Dept: GASTROENTEROLOGY | Facility: CLINIC | Age: 48
End: 2023-02-06
Payer: COMMERCIAL

## 2023-02-06 ENCOUNTER — TELEPHONE (OUTPATIENT)
Dept: GASTROENTEROLOGY | Facility: CLINIC | Age: 48
End: 2023-02-06
Payer: COMMERCIAL

## 2023-02-06 NOTE — TELEPHONE ENCOUNTER
I spoke with Debbie. Lab results given. Will send Stelara injection pa request to [facility].

## 2023-02-09 ENCOUNTER — TELEPHONE (OUTPATIENT)
Dept: GASTROENTEROLOGY | Facility: CLINIC | Age: 48
End: 2023-02-09
Payer: COMMERCIAL

## 2023-02-09 NOTE — TELEPHONE ENCOUNTER
I spoke with Debbie.  Her infusion of Stelara is scheduled for the 17th.  Symptom wise she is doing much better on the 30 mg of prednisone.  I have gone ahead and cut her steroid dose down to 25 mg a day.  I will arrange to see her about 4 to 5 weeks after her Stelara injection  Dr. Grant

## 2023-02-15 DIAGNOSIS — K50.918 CROHN'S DISEASE WITH OTHER COMPLICATION, UNSPECIFIED GASTROINTESTINAL TRACT LOCATION: Primary | ICD-10-CM

## 2023-02-17 ENCOUNTER — INFUSION (OUTPATIENT)
Dept: ONCOLOGY | Facility: HOSPITAL | Age: 48
End: 2023-02-17
Payer: COMMERCIAL

## 2023-02-17 VITALS
BODY MASS INDEX: 39.18 KG/M2 | HEART RATE: 88 BPM | RESPIRATION RATE: 18 BRPM | WEIGHT: 221.2 LBS | SYSTOLIC BLOOD PRESSURE: 146 MMHG | DIASTOLIC BLOOD PRESSURE: 84 MMHG | TEMPERATURE: 97.8 F

## 2023-02-17 DIAGNOSIS — K50.111 CROHN'S DISEASE OF LARGE INTESTINE WITH RECTAL BLEEDING: Primary | ICD-10-CM

## 2023-02-17 PROCEDURE — 25010000002 USTEKINUMAB 130 MG/26ML SOLUTION 26 ML VIAL: Performed by: INTERNAL MEDICINE

## 2023-02-17 PROCEDURE — 96365 THER/PROPH/DIAG IV INF INIT: CPT

## 2023-02-17 RX ADMIN — USTEKINUMAB 520 MG: 130 SOLUTION INTRAVENOUS at 12:51

## 2023-04-18 ENCOUNTER — OFFICE VISIT (OUTPATIENT)
Dept: GASTROENTEROLOGY | Facility: CLINIC | Age: 48
End: 2023-04-18
Payer: COMMERCIAL

## 2023-04-18 VITALS
HEART RATE: 110 BPM | WEIGHT: 227 LBS | TEMPERATURE: 97.1 F | SYSTOLIC BLOOD PRESSURE: 124 MMHG | OXYGEN SATURATION: 99 % | DIASTOLIC BLOOD PRESSURE: 82 MMHG | BODY MASS INDEX: 40.21 KG/M2

## 2023-04-18 DIAGNOSIS — E53.8 VITAMIN B 12 DEFICIENCY: ICD-10-CM

## 2023-04-18 DIAGNOSIS — K50.918 CROHN'S DISEASE WITH OTHER COMPLICATION, UNSPECIFIED GASTROINTESTINAL TRACT LOCATION: Primary | ICD-10-CM

## 2023-04-18 DIAGNOSIS — Z79.899 LONG TERM CURRENT USE OF IMMUNOSUPPRESSIVE DRUG: ICD-10-CM

## 2023-04-18 PROCEDURE — 99214 OFFICE O/P EST MOD 30 MIN: CPT | Performed by: INTERNAL MEDICINE

## 2023-04-18 RX ORDER — FERROUS SULFATE 137(45) MG
TABLET, EXTENDED RELEASE ORAL EVERY 24 HOURS
COMMUNITY

## 2023-04-18 RX ORDER — PREDNISONE 1 MG/1
TABLET ORAL
COMMUNITY
Start: 2023-03-29

## 2023-04-18 NOTE — PROGRESS NOTES
Patient Name: Debbie Clemens  YOB: 1975   Medical Record number: [id number]     Chief Complaint: Crohn's disease      HPI Debbie is an established patient of mine.  She has had Crohn's disease since she was a child.  I have cared for her this whole time.  She had done well and in remission throughout her treatment for her breast cancer.  Her Imuran was discontinued.  In 2018 she had a flare and was started on Entyvio.  She actually did well on this till 2023.  She was colonoscoped in January and had a flare again.  She was switched to Stelara.  She was begun on steroids.  She had her first infusion back in February.  Last Friday she did her first self injection.  She currently is asymptomatic with regards to her inflammatory bowel disease she is having normal bowel movements there is no blood in her stool she is not experience any abdominal pains.  Her steroids have been tapered.  She is actually tapered herself down to 15 mg.  She is here for follow-up    Past Medical History:   Past Medical History:   Diagnosis Date   • Abnormal EKG    • Breast cancer     left   • Crohn's disease     age 13   • Fatty liver 1/13/2023   • Heartburn     tums prn    • Hyperthyroidism    • Hypothyroidism 07/2021   • Irritable bowel syndrome    • Slow to wake up after anesthesia     for colonoscopy - d and c was fine    • Wears glasses        Family History:  Family History   Problem Relation Age of Onset   • Hypertension Other    • Heart disease Other    • Hyperlipidemia Other    • Heart attack Father         3 heart attacks   • Hypertension Mother         High blood pressure   • Ovarian cancer Neg Hx    • Breast cancer Neg Hx        Social History:   reports that she quit smoking about 13 years ago. Her smoking use included cigarettes. She started smoking about 23 years ago. She has a 7.50 pack-year smoking history. She has never used smokeless tobacco. She reports current alcohol use of about 4.0 standard drinks per  week. She reports that she does not use drugs.    Medications:     Current Outpatient Medications:   •  cyanocobalamin 1000 MCG/ML injection, Inject 1 mL into the appropriate muscle as directed by prescriber Every 30 (Thirty) Days., Disp: 1 mL, Rfl: 12  •  Ferrous Sulfate ER (Slow Fe) 142 (45 Fe) MG tablet controlled-release, Daily., Disp: , Rfl:   •  fluticasone (Flonase) 50 MCG/ACT nasal spray, 2 sprays into the nostril(s) as directed by provider Daily., Disp: 16 g, Rfl: 2  •  loratadine (CLARITIN) 10 MG tablet, Take 1 tablet by mouth Daily., Disp: , Rfl:   •  multivitamin with minerals tablet tablet, Take 1 tablet by mouth Daily., Disp: , Rfl:   •  predniSONE (DELTASONE) 5 MG tablet, TAKE 6 TABLETS BY MOUTH EVERY DAY, Disp: , Rfl:   •  Ustekinumab (STELARA) 90 MG/ML solution prefilled syringe Injection, Inject 90 mg under the skin into the appropriate area as directed Every 2 (Two) Months., Disp: 1 mL, Rfl: 6  •  vitamin D (ERGOCALCIFEROL) 1.25 MG (96417 UT) capsule capsule, TAKE 1 CAPSULE BY MOUTH 1 TIME EVERY WEEK, Disp: 12 capsule, Rfl: 0    Allergies:  Lialda [mesalamine], Nuts, Prilosec [omeprazole], and Epinephrine    Review of Systems   All other systems reviewed and are negative.          Vitals:   /82   Pulse 110   Temp 97.1 °F (36.2 °C)   Wt 103 kg (227 lb)   LMP 02/05/2021 (Approximate)   SpO2 99%   BMI 40.21 kg/m²      Physical Exam:   Constitutional: Pt is oriented to person, place, and time and well-developed, well-nourished, and in no distress.       Skin: Skin is warm and dry.   Psychiatric: Mood, memory, affect and judgment normal.           Assessment and Plan Debbie's disease has come under control.  He has had an excellent response so far to the Stelara.  We will continue to taper her steroids.  Of asked her to go to 10 mg for 2 weeks 5 mg for 2 weeks then 5 mg every other week for 2 weeks.  She knows to contact us if her disease acts up.  At least 30 minutes of time was spent  before during and after the visit and appropriate [id number] was billed        ICD-10-CM ICD-9-CM   1. Crohn's disease with other complication, unspecified gastrointestinal tract location  K50.918 555.9   2. Long term current use of immunosuppressive drug  Z79.899 V58.69   3. Vitamin B 12 deficiency  E53.8 266.2        Please note that portions of this note were completed with a voice recognition program.

## 2023-05-15 ENCOUNTER — LAB (OUTPATIENT)
Dept: INTERNAL MEDICINE | Facility: CLINIC | Age: 48
End: 2023-05-15
Payer: COMMERCIAL

## 2023-05-15 DIAGNOSIS — E05.90 HYPERTHYROIDISM: ICD-10-CM

## 2023-05-15 PROCEDURE — 36415 COLL VENOUS BLD VENIPUNCTURE: CPT | Performed by: PHYSICIAN ASSISTANT

## 2023-05-15 PROCEDURE — 84443 ASSAY THYROID STIM HORMONE: CPT | Performed by: PHYSICIAN ASSISTANT

## 2023-05-16 LAB — TSH SERPL DL<=0.05 MIU/L-ACNC: 0.58 UIU/ML (ref 0.27–4.2)

## 2023-06-06 ENCOUNTER — TELEPHONE (OUTPATIENT)
Dept: INTERNAL MEDICINE | Facility: CLINIC | Age: 48
End: 2023-06-06
Payer: COMMERCIAL

## 2023-06-06 NOTE — TELEPHONE ENCOUNTER
S/W PT TO INFORM HER THE LAST TDaP was in 2017. She will be protected until 2027. She states she stuck herself with a crafting knife and was just double checking. I advised her to call us back if the cut gets infected.

## 2023-06-06 NOTE — TELEPHONE ENCOUNTER
Caller: Debbie Clemens    Relationship: Self    Best call back number: [phone]    What was the call regarding: PATIENT STATES THAT SHE WOULD LIKE A CALL TO SEE WHEN SHE RECEIVED HER TDAP VACCINE.    Is it okay if the provider responds through MyChart: YES

## 2023-08-03 ENCOUNTER — OFFICE VISIT (OUTPATIENT)
Dept: INTERNAL MEDICINE | Facility: CLINIC | Age: 48
End: 2023-08-03
Payer: COMMERCIAL

## 2023-08-03 ENCOUNTER — LAB (OUTPATIENT)
Dept: INTERNAL MEDICINE | Facility: CLINIC | Age: 48
End: 2023-08-03
Payer: COMMERCIAL

## 2023-08-03 VITALS
DIASTOLIC BLOOD PRESSURE: 80 MMHG | RESPIRATION RATE: 20 BRPM | HEIGHT: 63 IN | BODY MASS INDEX: 40.22 KG/M2 | OXYGEN SATURATION: 98 % | HEART RATE: 71 BPM | SYSTOLIC BLOOD PRESSURE: 116 MMHG | TEMPERATURE: 97.6 F | WEIGHT: 227 LBS

## 2023-08-03 DIAGNOSIS — E55.9 VITAMIN D DEFICIENCY: ICD-10-CM

## 2023-08-03 DIAGNOSIS — R74.8 ELEVATED ALKALINE PHOSPHATASE LEVEL: ICD-10-CM

## 2023-08-03 DIAGNOSIS — R73.03 PREDIABETES: ICD-10-CM

## 2023-08-03 DIAGNOSIS — E53.8 VITAMIN B12 DEFICIENCY: ICD-10-CM

## 2023-08-03 DIAGNOSIS — Z13.220 SCREENING, LIPID: ICD-10-CM

## 2023-08-03 DIAGNOSIS — Z00.00 ROUTINE GENERAL MEDICAL EXAMINATION AT A HEALTH CARE FACILITY: ICD-10-CM

## 2023-08-03 DIAGNOSIS — K50.90 CROHN'S DISEASE WITHOUT COMPLICATION, UNSPECIFIED GASTROINTESTINAL TRACT LOCATION: ICD-10-CM

## 2023-08-03 DIAGNOSIS — K76.0 FATTY LIVER: ICD-10-CM

## 2023-08-03 DIAGNOSIS — Z91.89 AT RISK FOR SLEEP APNEA: ICD-10-CM

## 2023-08-03 DIAGNOSIS — R22.2 MASS OF SUBCUTANEOUS TISSUE OF BACK: ICD-10-CM

## 2023-08-03 DIAGNOSIS — Z00.00 ROUTINE GENERAL MEDICAL EXAMINATION AT A HEALTH CARE FACILITY: Primary | ICD-10-CM

## 2023-08-03 DIAGNOSIS — E05.90 HYPERTHYROIDISM: ICD-10-CM

## 2023-08-03 DIAGNOSIS — Z85.3 HISTORY OF BREAST CANCER: ICD-10-CM

## 2023-08-03 LAB
25(OH)D3 SERPL-MCNC: 17.8 NG/ML (ref 30–100)
ALBUMIN SERPL-MCNC: 4.3 G/DL (ref 3.5–5.2)
ALBUMIN/GLOB SERPL: 1.7 G/DL
ALP SERPL-CCNC: 115 U/L (ref 39–117)
ALT SERPL W P-5'-P-CCNC: 17 U/L (ref 1–33)
ANION GAP SERPL CALCULATED.3IONS-SCNC: 11.8 MMOL/L (ref 5–15)
AST SERPL-CCNC: 22 U/L (ref 1–32)
BASOPHILS # BLD AUTO: 0.09 10*3/MM3 (ref 0–0.2)
BASOPHILS NFR BLD AUTO: 0.9 % (ref 0–1.5)
BILIRUB SERPL-MCNC: <0.2 MG/DL (ref 0–1.2)
BUN SERPL-MCNC: 10 MG/DL (ref 6–20)
BUN/CREAT SERPL: 14.3 (ref 7–25)
CALCIUM SPEC-SCNC: 9.4 MG/DL (ref 8.6–10.5)
CHLORIDE SERPL-SCNC: 100 MMOL/L (ref 98–107)
CHOLEST SERPL-MCNC: 186 MG/DL (ref 0–200)
CO2 SERPL-SCNC: 25.2 MMOL/L (ref 22–29)
CREAT SERPL-MCNC: 0.7 MG/DL (ref 0.57–1)
DEPRECATED RDW RBC AUTO: 41.6 FL (ref 37–54)
EGFRCR SERPLBLD CKD-EPI 2021: 106.8 ML/MIN/1.73
EOSINOPHIL # BLD AUTO: 0.35 10*3/MM3 (ref 0–0.4)
EOSINOPHIL NFR BLD AUTO: 3.6 % (ref 0.3–6.2)
ERYTHROCYTE [DISTWIDTH] IN BLOOD BY AUTOMATED COUNT: 12.6 % (ref 12.3–15.4)
GLOBULIN UR ELPH-MCNC: 2.6 GM/DL
GLUCOSE SERPL-MCNC: 92 MG/DL (ref 65–99)
HBA1C MFR BLD: 6 % (ref 4.8–5.6)
HCT VFR BLD AUTO: 40.2 % (ref 34–46.6)
HDLC SERPL-MCNC: 38 MG/DL (ref 40–60)
HGB BLD-MCNC: 13.6 G/DL (ref 12–15.9)
IMM GRANULOCYTES # BLD AUTO: 0.03 10*3/MM3 (ref 0–0.05)
IMM GRANULOCYTES NFR BLD AUTO: 0.3 % (ref 0–0.5)
LDLC SERPL CALC-MCNC: 122 MG/DL (ref 0–100)
LDLC/HDLC SERPL: 3.15 {RATIO}
LYMPHOCYTES # BLD AUTO: 3.4 10*3/MM3 (ref 0.7–3.1)
LYMPHOCYTES NFR BLD AUTO: 34.9 % (ref 19.6–45.3)
MCH RBC QN AUTO: 30.9 PG (ref 26.6–33)
MCHC RBC AUTO-ENTMCNC: 33.8 G/DL (ref 31.5–35.7)
MCV RBC AUTO: 91.4 FL (ref 79–97)
MONOCYTES # BLD AUTO: 0.63 10*3/MM3 (ref 0.1–0.9)
MONOCYTES NFR BLD AUTO: 6.5 % (ref 5–12)
NEUTROPHILS NFR BLD AUTO: 5.23 10*3/MM3 (ref 1.7–7)
NEUTROPHILS NFR BLD AUTO: 53.8 % (ref 42.7–76)
NRBC BLD AUTO-RTO: 0 /100 WBC (ref 0–0.2)
PLATELET # BLD AUTO: 410 10*3/MM3 (ref 140–450)
PMV BLD AUTO: 10.7 FL (ref 6–12)
POTASSIUM SERPL-SCNC: 3.8 MMOL/L (ref 3.5–5.2)
PROT SERPL-MCNC: 6.9 G/DL (ref 6–8.5)
RBC # BLD AUTO: 4.4 10*6/MM3 (ref 3.77–5.28)
SODIUM SERPL-SCNC: 137 MMOL/L (ref 136–145)
TRIGL SERPL-MCNC: 142 MG/DL (ref 0–150)
TSH SERPL DL<=0.05 MIU/L-ACNC: 1.11 UIU/ML (ref 0.27–4.2)
VIT B12 BLD-MCNC: 491 PG/ML (ref 211–946)
VLDLC SERPL-MCNC: 26 MG/DL (ref 5–40)
WBC NRBC COR # BLD: 9.73 10*3/MM3 (ref 3.4–10.8)

## 2023-08-03 PROCEDURE — 83036 HEMOGLOBIN GLYCOSYLATED A1C: CPT | Performed by: PHYSICIAN ASSISTANT

## 2023-08-03 PROCEDURE — 80050 GENERAL HEALTH PANEL: CPT | Performed by: PHYSICIAN ASSISTANT

## 2023-08-03 PROCEDURE — 99396 PREV VISIT EST AGE 40-64: CPT | Performed by: PHYSICIAN ASSISTANT

## 2023-08-03 PROCEDURE — 82607 VITAMIN B-12: CPT | Performed by: PHYSICIAN ASSISTANT

## 2023-08-03 PROCEDURE — 36415 COLL VENOUS BLD VENIPUNCTURE: CPT | Performed by: PHYSICIAN ASSISTANT

## 2023-08-03 PROCEDURE — 80061 LIPID PANEL: CPT | Performed by: PHYSICIAN ASSISTANT

## 2023-08-03 PROCEDURE — 82306 VITAMIN D 25 HYDROXY: CPT | Performed by: PHYSICIAN ASSISTANT

## 2023-08-03 RX ORDER — CYANOCOBALAMIN 1000 UG/ML
1000 INJECTION, SOLUTION INTRAMUSCULAR; SUBCUTANEOUS
Qty: 1 ML | Refills: 12 | Status: SHIPPED | OUTPATIENT
Start: 2023-08-03

## 2023-08-03 NOTE — PROGRESS NOTES
Chief Complaint  Annual Exam    Subjective          History of Present Illness  Debbie Clemens presents to [facility] for a routine physical.  History of Present Illness  Hyperthyroid:  Seeing endo and is on methimazole at this time. Has f/u with them and repeat labs in May. Was found to have elevated alk phos which may be related to hyperthyroid.    Anxiety:  Is controlling things better lately. Feels like her daughter is the source of most of her stress. Sleeping well.   Feels like sx started after having hysterectomy last May.     Hx Breast CA:  Had total hysterectomy May 2021 due to prolonged periods and breast cancer hx.  Her breast CA was stage III and it was dx due to lump in her arm pit. Was seeing Dr Yuen for oncology. Had double mastectomy in Jan 2014. Was planning on reconstruction at some point.     Tachycardia:  Has seen cardio for this, has f/u with them next week. Had normal Echo. Elevated HR likely related to thyroid dz.   No CP, SOA, edema, diaphoresis. Has not had hx of HTN. Father had AMI, first one at age 50s, no fhx of stroke. HR has been up to 120 at rest.     Crohns dz:  Followed by Dr Grant for 30 years, started Entyvio in 2018, doing well with that. Takes vit B12 inj. Used to be on prednisone long term for this.    Fatigue/Snoring:  Would like to be eval for sleep apnea. She has snored for years but worse in the last 5 yr. Does not feel rested when she wakes up sometimes and does have daily fatigue.  Diet/exercise: trying to work on healthy eating  Eye exams: sees eye dr regularly. Wearing glasses.  Dental exams: sees dentist regularly    Patient's last menstrual period was 02/05/2021 (approximate).   reports that she is not currently sexually active and has had partner(s) who are female. She reports using the following method of birth control/protection: None.  Cancer-related family history is negative for Ovarian cancer and Breast cancer.    Mammogram-  hx of double mastectomy 2014, did have lymph node removal in left axilla. Followed by oncology yearly. Last Bone scan 12/2022.  Pap-  Hx of complete hysterectomy 2021  DEXA- 2015 normal. Hx of long term prednisone use, hx of hysterectomy age 45.  Colonoscopy- Dec 2019, normal. Has Crohns dz and is supposed to get them done every 3 years.      Health Maintenance   Topic Date Due    Pneumococcal Vaccine 0-64 (1 - PCV) Never done    COVID-19 Vaccine (6 - Mixed Product series) 05/31/2022    INFLUENZA VACCINE  10/01/2023    ANNUAL PHYSICAL  08/03/2024    COLORECTAL CANCER SCREENING  01/26/2026    TDAP/TD VACCINES (2 - Td or Tdap) 05/16/2027    HEPATITIS C SCREENING  Completed    PAP SMEAR  Discontinued       Immunization History   Administered Date(s) Administered    COVID-19 (PFIZER) Purple Cap Monovalent 03/11/2021    COVID-19 (UNSPECIFIED) 03/11/2021, 04/02/2021    Covid-19 (Pfizer) Gray Cap Monovalent 04/05/2022    Flu Vaccine Intradermal Quad 18-64YR 10/12/2015    Fluzone Quad >6mos (Multi-dose) 10/14/2019    Hepatitis A 05/24/2019, 12/04/2019    MMR 05/24/2019    Td, Unspecified 02/02/2004    Tdap 05/16/2017    flucelvax quad pfs =>4 YRS 10/22/2019       The following portions of the patient's history were reviewed and updated as appropriate: allergies, current medications, past family history, past medical history, past social history, past surgical history and problem list.    Patient Active Problem List   Diagnosis    Ductal carcinoma of left breast    Crohn disease    Crohn's disease of large intestine with rectal bleeding    Intraductal carcinoma of breast    Acute infection of nasal sinus    Cellulitis of neck    Visual impairment    Redness of eye, right    Mass of subcutaneous tissue of back    Tachycardia    Right hip pain    Abnormal EKG    Palpitations    History of breast cancer    Hyperthyroidism    Anxiety    Dizziness    Vitamin B12 deficiency    Routine general medical examination at a [facility]  facility    Elevated alkaline phosphatase level    Chronic cough    Allergic rhinitis    Fatty liver    At risk for sleep apnea    Prediabetes    Vitamin D deficiency     Allergies   Allergen Reactions    Lialda [Mesalamine] GI Intolerance     Worse symptoms of crohns    Nuts Swelling     TREE NUTS, makes tongue swell    Prilosec [Omeprazole] Diarrhea    Epinephrine Nausea Only     Jittery and nausea     Current Outpatient Medications on File Prior to Visit   Medication Sig Dispense Refill    fluticasone (Flonase) 50 MCG/ACT nasal spray 2 sprays into the nostril(s) as directed by provider Daily. 16 g 2    loratadine (CLARITIN) 10 MG tablet Take 1 tablet by mouth Daily.      multivitamin with minerals tablet tablet Take 1 tablet by mouth Daily.      Ustekinumab (STELARA) 90 MG/ML solution prefilled syringe Injection Inject 90 mg under the skin into the appropriate area as directed Every 2 (Two) Months. 1 mL 6    Ferrous Sulfate ER (Slow Fe) 142 (45 Fe) MG tablet controlled-release Daily. (Patient not taking: Reported on 8/3/2023)       No current facility-administered medications on file prior to visit.     New Medications Ordered This Visit   Medications    cyanocobalamin 1000 MCG/ML injection     Sig: Inject 1 mL into the appropriate muscle as directed by prescriber Every 30 (Thirty) Days.     Dispense:  1 mL     Refill:  12     Past Medical History:   Diagnosis Date    Abnormal EKG     Breast cancer     left    Crohn's disease     age 13    Fatty liver 2023    Heartburn     tums prn     Hyperthyroidism     Hypothyroidism 2021    Irritable bowel syndrome     Slow to wake up after anesthesia     for colonoscopy - d and c was fine     Vitamin D deficiency 8/3/2023    Wears glasses       Past Surgical History:   Procedure Laterality Date     SECTION      COLONOSCOPY  2019    D & C HYSTEROSCOPY N/A 2021    Procedure: HYSTEROSCOPY DILATION & CURRETAGE;  Surgeon: Liv Lorenzana MD;   "Location:  [facility];  Service: Obstetrics/Gynecology;  Laterality: N/A;    FLEXIBLE SIGMOIDOSCOPY      MASTECTOMY Bilateral 2014    ROOT CANAL  2017    Dr Teague     TOTAL LAPAROSCOPIC HYSTERECTOMY SALPINGO OOPHORECTOMY N/A 2021    Procedure: ROBOTIC ASSISTED HYSTERECTOMY WITH BILATERAL SALPINGO-OOPHORECTOMY AND CYSTOSCOPY;  Surgeon: Liv Lorenzana MD;  Location:  [facility];  Service: [facility];  Laterality: N/A;      Family History   Problem Relation Age of Onset    Hypertension Other     Heart disease Other     Hyperlipidemia Other     Heart attack Father         3 heart attacks    Hypertension Mother         High blood pressure    Ovarian cancer Neg Hx     Breast cancer Neg Hx       Social History     Socioeconomic History    Marital status:    Tobacco Use    Smoking status: Former     Packs/day: 0.75     Years: 10.00     Pack years: 7.50     Types: Cigarettes     Start date: 2000     Quit date: 2010     Years since quittin.6    Smokeless tobacco: Never   Vaping Use    Vaping Use: Never used   Substance and Sexual Activity    Alcohol use: Yes     Alcohol/week: 4.0 standard drinks     Types: 2 Glasses of wine, 2 Cans of beer per week     Comment: couple glasses of wine a week    Drug use: No    Sexual activity: Not Currently     Partners: Female     Birth control/protection: None        Objective   Vital Signs:   Vitals:    23 1310   BP: 116/80   Pulse: 71   Resp: 20   Temp: 97.6 øF (36.4 øC)   TempSrc: Temporal   SpO2: 98%   Weight: 103 kg (227 lb)   Height: 160 cm (63\")   PainSc: 0-No pain      Body mass index is 40.21 kg/mý.  Patient's Body mass index is 40.21 kg/mý. indicating that she is obese (BMI >30). Obesity-related health conditions include the following: none. Obesity is unchanged. BMI is is above average; BMI management plan is completed. We discussed low calorie, low carb based diet program, portion control and increasing exercise..    Physical Exam  Vitals " reviewed.   Constitutional:       General: She is not in acute distress.     Appearance: Normal appearance. She is not ill-appearing.   HENT:      Head: Normocephalic and atraumatic.      Right Ear: Tympanic membrane, ear canal and external ear normal.      Left Ear: Tympanic membrane, ear canal and external ear normal.      Mouth/Throat:      Mouth: Mucous membranes are moist.      Pharynx: No oropharyngeal exudate or posterior oropharyngeal erythema.   Eyes:      General: No scleral icterus.     Extraocular Movements: Extraocular movements intact.      Conjunctiva/sclera: Conjunctivae normal.      Pupils: Pupils are equal, round, and reactive to light.   Cardiovascular:      Rate and Rhythm: Normal rate and regular rhythm.      Pulses: Normal pulses.      Heart sounds: Normal heart sounds. No murmur heard.  Pulmonary:      Effort: Pulmonary effort is normal. No respiratory distress.      Breath sounds: Normal breath sounds. No stridor. No wheezing, rhonchi or rales.   Abdominal:      General: Bowel sounds are normal. There is no distension.      Palpations: Abdomen is soft. There is no mass.      Tenderness: There is no abdominal tenderness. There is no guarding.   Musculoskeletal:         General: No signs of injury. Normal range of motion.      Cervical back: Normal range of motion and neck supple.      Right lower leg: No edema.      Left lower leg: No edema.      Comments: Lipoma right upper back   Lymphadenopathy:      Cervical: No cervical adenopathy.   Skin:     General: Skin is warm and dry.      Coloration: Skin is not jaundiced.      Findings: No rash.   Neurological:      General: No focal deficit present.      Mental Status: She is alert and oriented to person, place, and time.      Gait: Gait normal.   Psychiatric:         Mood and Affect: Mood normal.         Behavior: Behavior normal.        Result Review :               Assessment and Plan    Diagnoses and all orders for this visit:    1. Routine  general medical examination at a health care facility (Primary)  -     Hemoglobin A1c; Future  -     Comprehensive Metabolic Panel; Future  -     Lipid Panel; Future  -     CBC Auto Differential; Future  -     TSH Rfx On Abnormal To Free T4; Future  -     Vitamin B12; Future  -     Vitamin D,25-Hydroxy; Future    2. At risk for sleep apnea  -     Ambulatory Referral to Sleep Medicine    3. Hyperthyroidism  Assessment & Plan:  Check TSH, cont off methimazole at this time. F/u with endo if has abn TSH. Will check q 3-6 mo, monitor for new sx such as fatigue or f/u if tachycardia returns.    Orders:  -     TSH Rfx On Abnormal To Free T4; Future    4. Crohn's disease without complication, unspecified gastrointestinal tract location  Assessment & Plan:  Chronic, stable, f/u with GI as dir    Orders:  -     Comprehensive Metabolic Panel; Future  -     CBC Auto Differential; Future    5. Elevated alkaline phosphatase level  -     Comprehensive Metabolic Panel; Future    6. Mass of subcutaneous tissue of back  Assessment & Plan:  Chronic, stable. Monitor.      7. History of breast cancer    8. Fatty liver  -     Comprehensive Metabolic Panel; Future    9. Vitamin B12 deficiency  -     Vitamin B12; Future  -     cyanocobalamin 1000 MCG/ML injection; Inject 1 mL into the appropriate muscle as directed by prescriber Every 30 (Thirty) Days.  Dispense: 1 mL; Refill: 12    10. Prediabetes  Assessment & Plan:  Check A1c, cont diet/exercise    Orders:  -     Hemoglobin A1c; Future    11. Screening, lipid  -     Lipid Panel; Future    12. Vitamin D deficiency  -     Vitamin D,25-Hydroxy; Future            Follow Up   Return in about 6 months (around 2/3/2024) for Chronic Conditions.    Counseled on health maintenance topics and preventative care recommendations. Follow up yearly for routine physical exam. Diet and exercise counseling given. See dentist and eye doctor yearly as directed.    If labs or images are ordered we will  contact you with the results within the next week.  If you have not heard from us after a week please call our office to inquire about the results.    Krista Don PA-C    Patient was given instructions and counseling regarding her condition or for health maintenance advice. Please see specific information pulled into the AVS if appropriate.     * Please note that portions of this note were completed with a voice recognition program.

## 2023-08-04 RX ORDER — ERGOCALCIFEROL 1.25 MG/1
50000 CAPSULE ORAL WEEKLY
Qty: 8 CAPSULE | Refills: 0 | Status: SHIPPED | OUTPATIENT
Start: 2023-08-04

## 2023-08-31 ENCOUNTER — PATIENT MESSAGE (OUTPATIENT)
Dept: GASTROENTEROLOGY | Facility: CLINIC | Age: 48
End: 2023-08-31
Payer: COMMERCIAL

## 2023-08-31 DIAGNOSIS — K50.918 CROHN'S DISEASE WITH OTHER COMPLICATION, UNSPECIFIED GASTROINTESTINAL TRACT LOCATION: Primary | ICD-10-CM

## 2023-08-31 NOTE — TELEPHONE ENCOUNTER
From: Debbie Clemens  To: Sergey Grant  Sent: 8/31/2023 6:26 AM EDT  Subject: Feeling a bit off since last Stellara shot    Since my last Stellara shot on August 4th I have a few episodes of urgency and blood in stool over the last 2 weeks. My next dose of Stellara is not until September 29th. Reaching out so my Chron's doesn't get out of hand.   Thanks and have a great day!  Debbie Clemens

## 2023-09-01 ENCOUNTER — LAB (OUTPATIENT)
Dept: LAB | Facility: HOSPITAL | Age: 48
End: 2023-09-01
Payer: COMMERCIAL

## 2023-09-01 DIAGNOSIS — K50.918 CROHN'S DISEASE WITH OTHER COMPLICATION, UNSPECIFIED GASTROINTESTINAL TRACT LOCATION: ICD-10-CM

## 2023-09-01 LAB
BASOPHILS # BLD AUTO: 0.07 10*3/MM3 (ref 0–0.2)
BASOPHILS NFR BLD AUTO: 0.7 % (ref 0–1.5)
DEPRECATED RDW RBC AUTO: 41.7 FL (ref 37–54)
EOSINOPHIL # BLD AUTO: 0.25 10*3/MM3 (ref 0–0.4)
EOSINOPHIL NFR BLD AUTO: 2.7 % (ref 0.3–6.2)
ERYTHROCYTE [DISTWIDTH] IN BLOOD BY AUTOMATED COUNT: 13 % (ref 12.3–15.4)
HCT VFR BLD AUTO: 38.9 % (ref 34–46.6)
HGB BLD-MCNC: 13.1 G/DL (ref 12–15.9)
IMM GRANULOCYTES # BLD AUTO: 0.04 10*3/MM3 (ref 0–0.05)
IMM GRANULOCYTES NFR BLD AUTO: 0.4 % (ref 0–0.5)
LYMPHOCYTES # BLD AUTO: 3.29 10*3/MM3 (ref 0.7–3.1)
LYMPHOCYTES NFR BLD AUTO: 35 % (ref 19.6–45.3)
MCH RBC QN AUTO: 30 PG (ref 26.6–33)
MCHC RBC AUTO-ENTMCNC: 33.7 G/DL (ref 31.5–35.7)
MCV RBC AUTO: 89.2 FL (ref 79–97)
MONOCYTES # BLD AUTO: 0.61 10*3/MM3 (ref 0.1–0.9)
MONOCYTES NFR BLD AUTO: 6.5 % (ref 5–12)
NEUTROPHILS NFR BLD AUTO: 5.13 10*3/MM3 (ref 1.7–7)
NEUTROPHILS NFR BLD AUTO: 54.7 % (ref 42.7–76)
NRBC BLD AUTO-RTO: 0 /100 WBC (ref 0–0.2)
PLATELET # BLD AUTO: 377 10*3/MM3 (ref 140–450)
PMV BLD AUTO: 10.7 FL (ref 6–12)
RBC # BLD AUTO: 4.36 10*6/MM3 (ref 3.77–5.28)
WBC NRBC COR # BLD: 9.39 10*3/MM3 (ref 3.4–10.8)

## 2023-09-01 PROCEDURE — 86140 C-REACTIVE PROTEIN: CPT | Performed by: INTERNAL MEDICINE

## 2023-09-01 PROCEDURE — 85025 COMPLETE CBC W/AUTO DIFF WBC: CPT

## 2023-09-01 PROCEDURE — 36415 COLL VENOUS BLD VENIPUNCTURE: CPT | Performed by: INTERNAL MEDICINE

## 2023-09-02 LAB — CRP SERPL-MCNC: 1.17 MG/DL (ref 0–0.5)

## 2023-09-04 NOTE — PROGRESS NOTES
Debbie your blood count is normal. The CRP is a measurement of inflammation. If the stool study is also elevated we have evidence to increase the frequency of your Stelara.

## 2023-09-06 ENCOUNTER — LAB (OUTPATIENT)
Dept: LAB | Facility: HOSPITAL | Age: 48
End: 2023-09-06
Payer: COMMERCIAL

## 2023-09-06 DIAGNOSIS — K50.918 CROHN'S DISEASE WITH OTHER COMPLICATION, UNSPECIFIED GASTROINTESTINAL TRACT LOCATION: ICD-10-CM

## 2023-09-06 PROCEDURE — 83993 ASSAY FOR CALPROTECTIN FECAL: CPT

## 2023-09-07 ENCOUNTER — TELEPHONE (OUTPATIENT)
Dept: GASTROENTEROLOGY | Facility: CLINIC | Age: 48
End: 2023-09-07
Payer: COMMERCIAL

## 2023-09-07 NOTE — TELEPHONE ENCOUNTER
I spoke with Debbie about her symptoms.  They have not changed.  She is under a lot of stress her daughter starting college she started a new job.  We will wait on her fecal calprotectin.  We may increase the frequency of her Stelara.  He knows to contact us if her symptoms change we will put her on a low-dose of prednisone.      Dr. Grant

## 2023-09-11 LAB — CALPROTECTIN STL-MCNT: 189 UG/G (ref 0–120)

## 2023-09-11 NOTE — PROGRESS NOTES
Please let Debbie know her stool study is abnormal and our next coarse of action will be to increase the frequency of her Stelara.

## 2023-09-12 DIAGNOSIS — K50.919 CROHN'S DISEASE WITH COMPLICATION, UNSPECIFIED GASTROINTESTINAL TRACT LOCATION: Primary | ICD-10-CM

## 2023-09-12 DIAGNOSIS — K50.919 CROHN'S DISEASE WITH COMPLICATION, UNSPECIFIED GASTROINTESTINAL TRACT LOCATION: ICD-10-CM

## 2023-09-12 RX ORDER — USTEKINUMAB 90 MG/ML
90 INJECTION, SOLUTION SUBCUTANEOUS
Qty: 1 ML | Refills: 5 | Status: SHIPPED | OUTPATIENT
Start: 2023-09-12 | End: 2023-09-12 | Stop reason: SDUPTHER

## 2023-09-12 RX ORDER — USTEKINUMAB 90 MG/ML
90 INJECTION, SOLUTION SUBCUTANEOUS
Qty: 1 ML | Refills: 5 | Status: SHIPPED | OUTPATIENT
Start: 2023-09-12

## 2023-09-12 NOTE — TELEPHONE ENCOUNTER
Nelson BLEVINS is approved for every 30 days dosing.  I have attached the updated approval letter to her media tab.  The new Stelara 90mg every 30 days rx needs to be [facility] to [facility]  [facility].

## 2023-09-12 NOTE — TELEPHONE ENCOUNTER
I SPOKE WITH AISHA. PATIENT LAST DOSE OF STELARA WAS ON 8/7/2023. PATIENT WILL START EVERY 4 WEEK DOSE TOMORROW. SHE HAS INJECTION IN HER FRIDGE ALREADY., AWAITING ON STELARA 90 MG EVERY 4 WEEKS PRESCRIPTION TO GET APPROVED.

## 2023-09-14 ENCOUNTER — OFFICE VISIT (OUTPATIENT)
Dept: SLEEP MEDICINE | Facility: CLINIC | Age: 48
End: 2023-09-14
Payer: COMMERCIAL

## 2023-09-14 VITALS
WEIGHT: 226.1 LBS | HEART RATE: 98 BPM | DIASTOLIC BLOOD PRESSURE: 75 MMHG | HEIGHT: 64 IN | OXYGEN SATURATION: 98 % | SYSTOLIC BLOOD PRESSURE: 121 MMHG | BODY MASS INDEX: 38.6 KG/M2

## 2023-09-14 DIAGNOSIS — R06.83 SNORING: Primary | ICD-10-CM

## 2023-09-14 DIAGNOSIS — R06.81 WITNESSED EPISODE OF APNEA: ICD-10-CM

## 2023-09-14 DIAGNOSIS — E66.9 OBESITY (BMI 30-39.9): ICD-10-CM

## 2023-09-14 NOTE — PROGRESS NOTES
New Sleep Patient Office Visit      Patient Name: Debbie Clemens    Referring Physician: Krista Don PA-C    Chief Complaint:    Chief Complaint   Patient presents with    Sleeping Problem       History of Present Illness: Debbie Clemens is a 48 y.o. female who is here today to establish care with Sleep Medicine.     48-year-old female with past medical history of Crohn's disease, IBS, previous thyroid problems recently normalized, breast cancer survivor presenting for initial evaluation.  Patient states that she has been told for a long time that she snores loudly.  She snores in all positions.  Patient wakes up gasping for air but not often.  Occasionally have dry mouth.  Denies any headaches in the morning.  Takes a while for her to wake up in the morning and during daytime she does take power naps anywhere from 15 to 20 minutes.  She states that her sleep latency is within 5 minutes.  Sleeps for about 7 hours a night.  She wakes up multiple times due to But occasionally will wake up to use the restroom.  Denies any other parasomnias during sleep.  Denies any REM behavior disorder.  Denies any restless leg symptoms.  Denies any shortness of breath.  Denies any chest pain.  Denies any leg edema.  She denies any grinding teeth but does drool at night.  She used to sleep walk when she was young but not lately.  Denies any driving problems or sleep-related accidents.  Occasional reflux symptoms but depending on if she eats close to bedtime but otherwise not.    Denies any smoking currently quit back in 2010.  Denies any illicit drug use.  Drinks occasional alcohol maybe twice a week either 2 glasses of wine or 2 beers.  Drinks 2 to 3 cups of coffee 16 ounces each.    Patient works as a  for [employer].  Denies any shift work.    Further sleep history is as below.    [place] Scale: 7/24    Estimated average amount of sleep per night: 7  Number of times  she wakes up at night: 1, but wakes  up few times because of cat.   Difficulty falling back asleep: no  It usually takes 5 minutes to go to sleep.  She feels sleepy upon waking up: occasional  Rotating or night shift work: no    Drowsiness/Sleepiness:  She exhibits the following:  excessive daytime fatigue  takes scheduled naps during the day    Snoring/Breathing:  She exhibits the following:  loud snoring  snores in all sleep positions  awoken with dry mouth    Reflux:  She describes the following:  NA    Narcolepsy:  She exhibits the following:  none    RLS/PLMs:  She describes the following:  none    Insomnia:  She describes the following:  NA    Parasomnia:  She exhibits the following:  None    Weight:  Weight change in the last year:  Stable    Subjective      Review of Systems:   Review of Systems   Constitutional:  Positive for fatigue.   HENT: Negative.     Respiratory: Negative.     Cardiovascular: Negative.    Gastrointestinal: Negative.    Endocrine: Negative.    Musculoskeletal: Negative.    Skin: Negative.    Neurological: Negative.    Hematological: Negative.    Psychiatric/Behavioral: Negative.     All other systems reviewed and are negative.    Past Medical History:   Past Medical History:   Diagnosis Date    Abnormal EKG     Breast cancer     left    Crohn's disease     age 13    Fatty liver 2023    Heartburn     tums prn     Hyperthyroidism     Hypothyroidism 2021    Irritable bowel syndrome     Slow to wake up after anesthesia     for colonoscopy - d and c was fine     Vitamin D deficiency 8/3/2023    Wears glasses        Past Surgical History:   Past Surgical History:   Procedure Laterality Date     SECTION      COLONOSCOPY  2019    D & C HYSTEROSCOPY N/A 2021    Procedure: HYSTEROSCOPY DILATION & CURRETAGE;  Surgeon: Liv Lorenzana MD;  Location: [facility];  Service: Obstetrics/Gynecology;  Laterality: N/A;    FLEXIBLE SIGMOIDOSCOPY      MASTECTOMY Bilateral 2014    ROOT CANAL  2017      Fraisure     TOTAL LAPAROSCOPIC HYSTERECTOMY SALPINGO OOPHORECTOMY N/A 2021    Procedure: ROBOTIC ASSISTED HYSTERECTOMY WITH BILATERAL SALPINGO-OOPHORECTOMY AND CYSTOSCOPY;  Surgeon: Liv Lorenzana MD;  Location: [facility];  Service: [facility];  Laterality: N/A;       Family History:   Family History   Problem Relation Age of Onset    Hypertension Mother         High blood pressure    Heart disease Father     Heart attack Father         3 heart attacks    Hypertension Other     Heart disease Other     Hyperlipidemia Other     Ovarian cancer Neg Hx     Breast cancer Neg Hx        Social History:   Social History     Socioeconomic History    Marital status:    Tobacco Use    Smoking status: Former     Packs/day: 0.75     Years: 10.00     Pack years: 7.50     Types: Cigarettes     Start date: 2000     Quit date: 2010     Years since quittin.7    Smokeless tobacco: Never   Vaping Use    Vaping Use: Never used   Substance and Sexual Activity    Alcohol use: Yes     Alcohol/week: 4.0 standard drinks     Types: 2 Glasses of wine, 2 Cans of beer per week     Comment: couple glasses of wine a week    Drug use: Never    Sexual activity: Not Currently     Partners: Female     Birth control/protection: None       Medications:     Current Outpatient Medications:     cyanocobalamin 1000 MCG/ML injection, Inject 1 mL into the appropriate muscle as directed by prescriber Every 30 (Thirty) Days., Disp: 1 mL, Rfl: 12    fluticasone (Flonase) 50 MCG/ACT nasal spray, 2 sprays into the nostril(s) as directed by provider Daily., Disp: 16 g, Rfl: 2    loratadine (CLARITIN) 10 MG tablet, Take 1 tablet by mouth Daily., Disp: , Rfl:     multivitamin with minerals tablet tablet, Take 1 tablet by mouth Daily., Disp: , Rfl:     Ustekinumab (Stelara) 90 MG/ML solution prefilled syringe Injection, Inject 90 mg under the skin into the appropriate area as directed Every 30 (Thirty) Days., Disp: 1 mL, Rfl: 5     "vitamin D (ERGOCALCIFEROL) 1.25 MG (65232 UT) capsule capsule, Take 1 capsule by mouth 1 (One) Time Per Week., Disp: 8 capsule, Rfl: 0    Ferrous Sulfate ER (Slow Fe) 142 (45 Fe) MG tablet controlled-release, Daily. (Patient not taking: Reported on 8/3/2023), Disp: , Rfl:     Allergies:   Allergies   Allergen Reactions    Lialda [Mesalamine] GI Intolerance     Worse symptoms of crohns    Nuts Swelling     TREE NUTS, makes tongue swell    Prilosec [Omeprazole] Diarrhea    Epinephrine Nausea Only     Jittery and nausea       Objective     Physical Exam:  Vital Signs:   Vitals:    09/14/23 0819   BP: 121/75   BP Location: Right arm   Patient Position: Sitting   Pulse: 98   SpO2: 98%   Weight: 103 kg (226 lb 1.6 oz)   Height: 162.6 cm (64\")     Body mass index is 38.81 kg/m².    Physical Exam  Vitals and nursing note reviewed.   Constitutional:       General: She is not in acute distress.     Appearance: She is well-developed. She is not diaphoretic.   HENT:      Head: Normocephalic and atraumatic.      Comments: Mallampati 3 airway, redundant soft palate     Nose: Nose normal.      Mouth/Throat:      Pharynx: No oropharyngeal exudate.   Eyes:      General:         Right eye: No discharge.         Left eye: No discharge.      Pupils: Pupils are equal, round, and reactive to light.   Neck:      Thyroid: No thyromegaly.      Trachea: No tracheal deviation.   Cardiovascular:      Rate and Rhythm: Normal rate and regular rhythm.      Heart sounds: Normal heart sounds. No murmur heard.    No friction rub. No gallop.   Pulmonary:      Effort: Pulmonary effort is normal. No respiratory distress.      Breath sounds: Normal breath sounds. No wheezing or rales.   Musculoskeletal:         General: No tenderness.      Cervical back: Neck supple.      Right lower leg: No edema.      Left lower leg: No edema.      Comments: Clubbing: none   Neurological:      Mental Status: She is alert and oriented to person, place, and time. "   Psychiatric:         Behavior: Behavior normal.         Thought Content: Thought content normal.         Judgment: Judgment normal.       Results Review:   I reviewed the patient's new clinical results.  Lab Results   Component Value Date    TSH 1.110 08/03/2023       Assessment / Plan      Assessment:   Problem List Items Addressed This Visit    None  Visit Diagnoses       Snoring    -  Primary    Witnessed episode of apnea        Obesity (BMI 30-39.9)                  Plan:   1.  Patient with complains of snoring, witnessed apnea, daytime fatigue, obesity, Mallampati 3 airway with redundant soft palate. All this put her at high risk of sleep disordered breathing.  Discussed at length about pathophysiology of sleep apnea.  Discussed side effects of untreated sleep apnea including poor quality sleep, cardiovascular, neurologic and metabolic side effects also discussed.  Further diagnostic testing recommended.  Patient is amenable to proceed with further testing and treatment as needed.  We discussed home study versus in lab study.  Patient is amenable to proceeding with home-based testing after our discussion currently.  We will set him up for that and follow-up closely after.     2.  If found to have significant sleep apnea available treatment options discussed including CPAP therapy, oral appliance, surgical options, Inspire therapy.  Weight loss as a treatment option for sleep apnea also discussed and counseled.     3.  Increasing activity advised.      4.  If patient home study is negative then monitor her closely as she is not much symptomatic from sleep apnea standpoint.  If it is primary snoring then mandibular advancement device can still be considered.  Positional therapy may not work as patient snores in all body positions.    Thank you for allowing me to participate in the care of this patient.  We will follow her closely.    Follow Up:   After HST    Discussed plan of care in detail with patient today.  Patient verbally understands and agrees. I spent 45 minutes on this date of service. This time includes time spent by me in the following activities:preparing for the visit, reviewing tests, obtaining and/or reviewing a separately obtained history, performing a medically appropriate examination, counseling the patient, ordering tests, or procedures, and/or documenting information in the medical record. This time excludes other separate billable services such as interpretation of tests or procedures, if applicable.     Gorge Murillo MD  Pulmonary Critical Care and Sleep Medicine

## 2023-09-25 ENCOUNTER — HOSPITAL ENCOUNTER (OUTPATIENT)
Dept: SLEEP MEDICINE | Facility: HOSPITAL | Age: 48
Discharge: HOME | End: 2023-09-25
Admitting: INTERNAL MEDICINE
Payer: COMMERCIAL

## 2023-09-25 VITALS — WEIGHT: 226 LBS | HEIGHT: 64 IN | BODY MASS INDEX: 38.58 KG/M2

## 2023-09-25 DIAGNOSIS — R06.81 WITNESSED EPISODE OF APNEA: ICD-10-CM

## 2023-09-25 DIAGNOSIS — R06.83 SNORING: ICD-10-CM

## 2023-09-25 PROCEDURE — 95800 SLP STDY UNATTENDED: CPT

## 2023-09-27 DIAGNOSIS — G47.33 OSA (OBSTRUCTIVE SLEEP APNEA): Primary | ICD-10-CM

## 2023-10-03 ENCOUNTER — OFFICE VISIT (OUTPATIENT)
Dept: GASTROENTEROLOGY | Facility: CLINIC | Age: 48
End: 2023-10-03
Payer: COMMERCIAL

## 2023-10-03 VITALS
OXYGEN SATURATION: 96 % | HEART RATE: 87 BPM | DIASTOLIC BLOOD PRESSURE: 80 MMHG | BODY MASS INDEX: 38.38 KG/M2 | HEIGHT: 64 IN | TEMPERATURE: 96.9 F | SYSTOLIC BLOOD PRESSURE: 124 MMHG | WEIGHT: 224.8 LBS

## 2023-10-03 DIAGNOSIS — E53.8 VITAMIN B12 DEFICIENCY: ICD-10-CM

## 2023-10-03 DIAGNOSIS — K50.119 CROHN'S DISEASE OF LARGE INTESTINE WITH COMPLICATION: Primary | ICD-10-CM

## 2023-10-03 PROCEDURE — 99214 OFFICE O/P EST MOD 30 MIN: CPT | Performed by: INTERNAL MEDICINE

## 2023-10-03 RX ORDER — ERGOCALCIFEROL 1.25 MG/1
CAPSULE ORAL
Qty: 8 CAPSULE | Refills: 0 | OUTPATIENT
Start: 2023-10-03

## 2023-10-03 NOTE — PROGRESS NOTES
Patient Name: Debbie Clemens  YOB: 1975   Medical Record number: [id number]     Chief Complaint:   Crohn's disease      HPI Debbie is an established patient of mine.  She has known Crohn's disease confined to her colon.  Cared for her since she was a child.  She was actually maintained on Entyvio for almost 5 years.  In January of this year she had a significant flare.  She was switched to Stelara.  She also concurrently received steroids.  She had contacted our office in late August.  It seemed about a week to 2 weeks before her next Stelara injection she was having problems with cramps urgency and more frequent bowel movements and occasional blood.  Fecal calprotectin was increased and arrangements have been made for her to decrease her Stelara junction time from 8 weeks to every 4 weeks.  She is to do that tomorrow.  She otherwise feels well.  He recently had a sleep study and said that she was borderline in need of a CPAP machine.  She is actively trying to lose weight.  She is here today for her follow-up    Past Medical History:   Past Medical History:   Diagnosis Date    Abnormal EKG     Breast cancer     left    Crohn's disease     age 13    Fatty liver 1/13/2023    Heartburn     tums prn     Hyperthyroidism     Hypothyroidism 07/2021    Irritable bowel syndrome     YE (obstructive sleep apnea) 9/14/2023    Slow to wake up after anesthesia     for colonoscopy - d and c was fine     Vitamin D deficiency 8/3/2023    Wears glasses        Family History:  Family History   Problem Relation Age of Onset    Hypertension Mother         High blood pressure    Heart disease Father     Heart attack Father         3 heart attacks    Hypertension Other     Heart disease Other     Hyperlipidemia Other     Ovarian cancer Neg Hx     Breast cancer Neg Hx        Social History:   reports that she quit smoking about 13 years ago. Her smoking use included cigarettes. She started smoking about 23 years ago. She  has a 7.50 pack-year smoking history. She has never used smokeless tobacco. She reports current alcohol use of about 4.0 standard drinks per week. She reports that she does not use drugs.    Medications:     Current Outpatient Medications:     cyanocobalamin 1000 MCG/ML injection, Inject 1 mL into the appropriate muscle as directed by prescriber Every 30 (Thirty) Days., Disp: 1 mL, Rfl: 12    fluticasone (Flonase) 50 MCG/ACT nasal spray, 2 sprays into the nostril(s) as directed by provider Daily., Disp: 16 g, Rfl: 2    loratadine (CLARITIN) 10 MG tablet, Take 1 tablet by mouth Daily., Disp: , Rfl:     multivitamin with minerals tablet tablet, Take 1 tablet by mouth Daily., Disp: , Rfl:     Ustekinumab (Stelara) 90 MG/ML solution prefilled syringe Injection, Inject 90 mg under the skin into the appropriate area as directed Every 30 (Thirty) Days., Disp: 1 mL, Rfl: 5    vitamin D (ERGOCALCIFEROL) 1.25 MG (68958 UT) capsule capsule, Take 1 capsule by mouth 1 (One) Time Per Week., Disp: 8 capsule, Rfl: 0    Allergies:  Lialda [mesalamine], Nuts, Prilosec [omeprazole], and Epinephrine    Review of Systems   Constitutional:  Negative for activity change, appetite change, fatigue, fever and unexpected weight change.   HENT:  Negative for hearing loss, trouble swallowing and voice change.    Eyes:  Negative for visual disturbance.   Respiratory:  Negative for cough, choking, chest tightness and shortness of breath.    Cardiovascular:  Negative for chest pain.   Gastrointestinal:  Positive for blood in stool and diarrhea. Negative for abdominal distention, abdominal pain, anal bleeding, constipation, nausea, rectal pain and vomiting.   Endocrine: Negative for polydipsia and polyphagia.   Genitourinary: Negative.    Musculoskeletal:  Negative for gait problem and joint swelling.   Skin:  Negative for color change and rash.   Allergic/Immunologic: Negative for food allergies.   Neurological:  Negative for dizziness, seizures  "and speech difficulty.   Hematological:  Negative for adenopathy.   Psychiatric/Behavioral:  Negative for confusion.          Vitals:   /80 (BP Location: Right arm, Patient Position: Sitting, Cuff Size: Adult)   Pulse 87   Temp 96.9 °F (36.1 °C) (Temporal)   Ht 162.6 cm (64\")   Wt 102 kg (224 lb 12.8 oz)   LMP 02/05/2021 (Approximate)   SpO2 96%   BMI 38.59 kg/m²      Physical Exam:         Skin: Skin is warm and dry.   Psychiatric: Mood, memory, affect and judgment normal.           Assessment and Plan Debbie hopefully will come back under control with the Stelara on a every 4 week basis.  We had a long discussion about weight loss.  She had brought some supplements which she is going to return.  I have recommended obviously exercise and having her go back on weight watchers.  She is to let us know how she does with the more frequent injections and keep track of symptoms around it.  She has some urgency in the late afternoons coming home from work and I recommend that she use some Imodium.  I have also asked her to use align a probiotic on a regular basis.  I will place her orders and will ask her to get her fecal calprotectin and other laboratory studies in about 2 months time.  All of her questions were answered at least 30 minutes of time was spent before during and after the visit and appropriate [id number] was billed.  We will see her back in 3 months time        ICD-10-CM ICD-9-CM   1. Crohn's disease of large intestine with complication  K50.119 555.1   2. Vitamin B12 deficiency  E53.8 266.2   Sergey Grant M.D.  [facility]     Please note that portions of this note were completed with a voice recognition program.  "

## 2023-10-05 ENCOUNTER — TELEPHONE (OUTPATIENT)
Dept: SLEEP MEDICINE | Facility: HOSPITAL | Age: 48
End: 2023-10-05
Payer: COMMERCIAL

## 2023-10-05 NOTE — TELEPHONE ENCOUNTER
Advised patient of study results. She verbalized understanding and agreed to pap therapy. Faxed setup 10/4/23

## 2023-11-20 ENCOUNTER — OFFICE VISIT (OUTPATIENT)
Dept: ONCOLOGY | Facility: CLINIC | Age: 48
End: 2023-11-20
Payer: COMMERCIAL

## 2023-11-20 VITALS
WEIGHT: 224 LBS | HEART RATE: 88 BPM | TEMPERATURE: 97.5 F | BODY MASS INDEX: 38.24 KG/M2 | OXYGEN SATURATION: 98 % | SYSTOLIC BLOOD PRESSURE: 132 MMHG | HEIGHT: 64 IN | RESPIRATION RATE: 16 BRPM | DIASTOLIC BLOOD PRESSURE: 91 MMHG

## 2023-11-20 DIAGNOSIS — C50.912 DUCTAL CARCINOMA OF LEFT BREAST: Primary | ICD-10-CM

## 2023-11-20 PROCEDURE — 99213 OFFICE O/P EST LOW 20 MIN: CPT | Performed by: INTERNAL MEDICINE

## 2023-11-20 NOTE — PROGRESS NOTES
PROBLEM LIST:  Oncology/Hematology History Overview Note   1.  Premenopausal stage III infiltrating ductal left breast cancer - original  biopsy 08/22/2013.   a)  Left breast primary with biopsy proven left axillary lymph node involvement  with PET/CT revealing hypermetabolic adenopathy in the left axilla, the  intramammary chain on the left, and the lower left neck/supraclavicular region  with hypermetabolic nodule in the anterior left breast.   b)  ER focally positive and [place] negative, HER-2 positive.   c)  Status post neoadjuvant THC chemotherapy with complete pathologic response  by the time of her bilateral mastectomies.    d)  Completing adjuvant Herceptin x1 year.  e)  Status post adjuvant radiotherapy to the left chest wall, the left  supraclavicular area, left inframammary chain and boost to the left axilla.   f)  Tamoxifen begun subsequent to adjuvant radiotherapy and continued to date.   2.  History of Crohn's disease diagnosed at age 13.   a)  Chronic Imuran therapy - discontinued at the time of initiation of  chemotherapy.     Ductal carcinoma of left breast   11/9/2016 Initial Diagnosis    Ductal carcinoma of left breast (CMS/HCC)         REASON FOR VISIT: Breast cancer    HISTORY OF PRESENT ILLNESS:   48 y.o.  female presents today for follow-up of her HER-2 positive breast cancer.  Since I last saw her her alkaline phosphatase has returned to normal.  She does have Crohn's that is becoming difficult to manage.  She is on double the dose of Stelara.  That seems to be improving her symptoms.    Past medical history, social history and family history was reviewed and unchanged from prior visit.    Review of Systems:    Review of Systems   Constitutional: Negative.    HENT:  Negative.     Eyes: Negative.    Respiratory: Negative.     Cardiovascular: Negative.    Gastrointestinal: Negative.    Endocrine: Negative.    Genitourinary: Negative.     Musculoskeletal: Negative.    Skin: Negative.   "  Neurological: Negative.    Hematological: Negative.    Psychiatric/Behavioral: Negative.        A comprehensive 14 point review of systems was performed and was negative except as mentioned.      Medications:  The current medication list was reviewed in the EMR    ALLERGIES:    Allergies   Allergen Reactions    Lialda [Mesalamine] GI Intolerance     Worse symptoms of crohns    Nuts Swelling     TREE NUTS, makes tongue swell    Prilosec [Omeprazole] Diarrhea    Epinephrine Nausea Only     Jittery and nausea         Physical Exam    VITAL SIGNS:  /91 Comment: RUE  Pulse 88   Temp 97.5 °F (36.4 °C) (Infrared)   Resp 16   Ht 162.6 cm (64\")   Wt 102 kg (224 lb)   LMP 02/05/2021 (Approximate)   SpO2 98% Comment: RA  BMI 38.45 kg/m²     Wt Readings from Last 3 Encounters:   11/20/23 102 kg (224 lb)   10/03/23 102 kg (224 lb 12.8 oz)   09/25/23 103 kg (226 lb)        Performance Status: 0    General: well appearing, in no acute distress  HEENT: sclera anicteric, oropharynx clear, neck is supple  Lymphatics: no cervical, supraclavicular, or axillary adenopathy  Cardiovascular: regular rate and rhythm, no murmurs, rubs or gallops  Lungs: clear to auscultation bilaterally  Abdomen: soft, nontender, nondistended.  No palpable organomegaly  Extremities: no lower extremity edema  Skin: no rashes, lesions, bruising, or petechiae  Msk:  Shows no weakness of the large muscle groups  Psych: Mood is stable        RECENT LABS:    Lab Results   Component Value Date    HGB 13.1 09/01/2023    HCT 38.9 09/01/2023    MCV 89.2 09/01/2023     09/01/2023    WBC 9.39 09/01/2023    NEUTROABS 5.13 09/01/2023    LYMPHSABS 3.29 (H) 09/01/2023    MONOSABS 0.61 09/01/2023    EOSABS 0.25 09/01/2023    BASOSABS 0.07 09/01/2023       Lab Results   Component Value Date    GLUCOSE 92 08/03/2023    BUN 10 08/03/2023    CREATININE 0.70 08/03/2023     08/03/2023    K 3.8 08/03/2023     08/03/2023    CO2 25.2 08/03/2023    " CALCIUM 9.4 08/03/2023    PROTEINTOT 6.9 08/03/2023    ALBUMIN 4.3 08/03/2023    BILITOT <0.2 08/03/2023    ALKPHOS 115 08/03/2023    AST 22 08/03/2023    ALT 17 08/03/2023              Assessment/Plan    1.  Stage IIIc HER-2 positive ductal carcinoma of the left breast.  Alkaline phosphatase back to normal.  At this point she is over 10 years out from her diagnosis.  She does not need any evaluation at this point.  I will see her on an as-needed basis.      2.  Crohn's disease.  This seems to be causing more issues recently.        Kartik Yuen MD  [facility]         No orders of the defined types were placed in this encounter.      11/20/2023

## 2023-11-29 ENCOUNTER — TELEPHONE (OUTPATIENT)
Dept: GASTROENTEROLOGY | Facility: CLINIC | Age: 48
End: 2023-11-29
Payer: COMMERCIAL

## 2023-11-29 NOTE — TELEPHONE ENCOUNTER
Bindu Wang called patient needs PA for stelara injection   PA department is [phone] option 2 then option 3

## 2023-11-30 ENCOUNTER — PRIOR AUTHORIZATION (OUTPATIENT)
Dept: GASTROENTEROLOGY | Facility: CLINIC | Age: 48
End: 2023-11-30
Payer: COMMERCIAL

## 2023-11-30 NOTE — TELEPHONE ENCOUNTER
STELARA INJECTION 90MG/ 1ML SYRINGE ONCE A MONTH.   PA APPROVAL  CASE #[id number]  11/30/2023-3/21/2024    BERTA BLOOD FROM [facility]

## 2023-12-13 NOTE — PROGRESS NOTES
Sleep Clinic Follow Up Note    Chief Complaint  Follow-up    Subjective     History of Present Illness (from previous encounter on 9/14/2023 with Dr. Murillo):   Debbie Clemens is a 48 y.o. female who is here today to establish care with Sleep Medicine.      48-year-old female with past medical history of Crohn's disease, IBS, previous thyroid problems recently normalized, breast cancer survivor presenting for initial evaluation.  Patient states that she has been told for a long time that she snores loudly.  She snores in all positions.  Patient wakes up gasping for air but not often.  Occasionally have dry mouth.  Denies any headaches in the morning.  Takes a while for her to wake up in the morning and during daytime she does take power naps anywhere from 15 to 20 minutes.  She states that her sleep latency is within 5 minutes.  Sleeps for about 7 hours a night.  She wakes up multiple times due to But occasionally will wake up to use the restroom.  Denies any other parasomnias during sleep.  Denies any REM behavior disorder.  Denies any restless leg symptoms.  Denies any shortness of breath.  Denies any chest pain.  Denies any leg edema.  She denies any grinding teeth but does drool at night.  She used to sleep walk when she was young but not lately.  Denies any driving problems or sleep-related accidents.  Occasional reflux symptoms but depending on if she eats close to bedtime but otherwise not.     Denies any smoking currently quit back in 2010.  Denies any illicit drug use.  Drinks occasional alcohol maybe twice a week either 2 glasses of wine or 2 beers.  Drinks 2 to 3 cups of coffee 16 ounces each.     Patient works as a  for [employer].  Denies any shift work.     Further sleep history is as below.     Bowbells Scale: 7/24 (End copied text).    -A home sleep test was obtained on 9/26/2023 revealing severe obstructive sleep apnea with an AHI of 41.5/H.    Interval History:  Debbie Clemens is a  48 y.o. female returns for follow up and compliance of PAP therapy. The patient was last seen on 2023 with Dr. Murillo. Overall the patient feels good with regard to therapy. The device appears to be working appropriately. On average the patient sleeps 5-7 hours per night. The patient wakes 2 times per night. She feel improved with use of her device.    The patient reports the following changes to their medical and medication history since they were last seen:  None    Further details are as follows:    Brock[place] Scale is (out of 24): Total score: 8     Weight:  Current Weight: 227 lb    The patient's relevant past medical, surgical, family, and social history reviewed and updated in Epic as appropriate.    PMH:    Past Medical History:   Diagnosis Date    Abnormal EKG     Breast cancer     left    Crohn's disease     age 13    Fatty liver 2023    Heartburn     tums prn     Hyperthyroidism     Hypothyroidism 2021    Irritable bowel syndrome     YE (obstructive sleep apnea) 2023    Slow to wake up after anesthesia     for colonoscopy - d and c was fine     Vitamin D deficiency 8/3/2023    Wears glasses      Past Surgical History:   Procedure Laterality Date     SECTION      COLONOSCOPY  2019    D & C HYSTEROSCOPY N/A 2021    Procedure: HYSTEROSCOPY DILATION & CURRETAGE;  Surgeon: Liv Lorenzana MD;  Location:  [facility];  Service: Obstetrics/Gynecology;  Laterality: N/A;    FLEXIBLE SIGMOIDOSCOPY      MASTECTOMY Bilateral 2014    ROOT CANAL  2017    Dr Teague     TOTAL LAPAROSCOPIC HYSTERECTOMY SALPINGO OOPHORECTOMY N/A 2021    Procedure: ROBOTIC ASSISTED HYSTERECTOMY WITH BILATERAL SALPINGO-OOPHORECTOMY AND CYSTOSCOPY;  Surgeon: Liv Lorenzana MD;  Location:  [facility];  Service: [facility];  Laterality: N/A;     OB History          1    Para   1    Term   1            AB        Living             SAB        IAB        Ectopic        Molar      "   Multiple        Live Births                  Allergies   Allergen Reactions    Lialda [Mesalamine] GI Intolerance     Worse symptoms of crohns    Nuts Swelling     TREE NUTS, makes tongue swell    Prilosec [Omeprazole] Diarrhea    Epinephrine Nausea Only     Jittery and nausea       MEDS:  Prior to Admission medications    Medication Sig Start Date End Date Taking? Authorizing Provider   cyanocobalamin 1000 MCG/ML injection Inject 1 mL into the appropriate muscle as directed by prescriber Every 30 (Thirty) Days. 8/3/23   Krista Don PA-C   fluticasone (Flonase) 50 MCG/ACT nasal spray 2 sprays into the nostril(s) as directed by provider Daily. 9/13/21   Krista Don PA-C   loratadine (CLARITIN) 10 MG tablet Take 1 tablet by mouth Daily.    Provider, MD Carmelita   multivitamin with minerals tablet tablet Take 1 tablet by mouth Daily.    ProviderCarmelita MD   Ustekinumab (Stelara) 90 MG/ML solution prefilled syringe Injection Inject 90 mg under the skin into the appropriate area as directed Every 30 (Thirty) Days. 9/12/23   Sergey Grant MD   vitamin D (ERGOCALCIFEROL) 1.25 MG (49873 UT) capsule capsule Take 1 capsule by mouth 1 (One) Time Per Week. 8/4/23   Krista Don PA-C         FH:  Family History   Problem Relation Age of Onset    Hypertension Mother         High blood pressure    Heart disease Father     Heart attack Father         3 heart attacks    Hypertension Other     Heart disease Other     Hyperlipidemia Other     Ovarian cancer Neg Hx     Breast cancer Neg Hx        Objective   Vital Signs:  /70 (BP Location: Right arm, Patient Position: Sitting)   Pulse 90   Ht 162.6 cm (64\")   Wt 103 kg (227 lb)   SpO2 98%   BMI 38.96 kg/m²              Physical Exam  Vitals reviewed.   Constitutional:       Appearance: Normal appearance.   HENT:      Head: Normocephalic and atraumatic.      Nose: Nose normal.      Mouth/Throat:      Mouth: Mucous membranes are moist. "   Cardiovascular:      Rate and Rhythm: Normal rate and regular rhythm.      Heart sounds: No murmur heard.     No friction rub. No gallop.   Pulmonary:      Effort: Pulmonary effort is normal. No respiratory distress.      Breath sounds: Normal breath sounds. No wheezing or rhonchi.   Neurological:      Mental Status: She is alert and oriented to person, place, and time.   Psychiatric:         Behavior: Behavior normal.               Result Review :           PAP Report:  AHI: 1.4/h  Days of Usage: 41/47 (87%)  Number of Days Greater than 4 hours: 38/47 (81%)  Average time (days used): 5 hours 33 minutes  95th Percentile Pressure: 8.2 cm H2O  95th percentile leaks: 10.2 L/min  Settings: Auto CPAP-4/20 cm H2O, EPR full-time, EPR level 3, response standard.       Assessment and Plan  Debbie Clemens is a 48 y.o. female who returns for follow-up and compliance of PAP therapy.  The Pap report has been reviewed.  Overall usage is at 87% with compliance at 81%.  Patient averages 5 hours 33 minutes.  Sleep apnea is well-controlled with an AHI of 1.4/H. Patient has a history of severe obstructive sleep apnea with an initial AHI of 41.5/H. I will refill the patient's supplies, and I have asked her to return for follow-up and compliance in 1 year or sooner should she have further questions or concerns.    Diagnoses and all orders for this visit:    1. YE (obstructive sleep apnea) (Primary)  -     PAP Therapy    2. Obesity (BMI 30-39.9)         The patient continues to use and benefit from PAP therapy.    1. The patient was counseled regarding multimodal approach with healthy nutrition, healthy sleep, regular physical activity, social activities, counseling, and medications. Encouraged to practice lateral sleep position. Avoid alcohol and sedatives close to bedtime.     2.  We will refill supplies x1 year.  Return to clinic 1 year or sooner if symptoms warrant. I have reviewed the results of my evaluation and impression and  discussed my recommendations in detail with the patient.           Follow Up  Return in about 1 year (around 12/18/2024) for Annual visit.  Patient was given instructions and counseling regarding her condition or for health maintenance advice. Please see specific information pulled into the AVS if appropriate.       ORIANA Roe, ACNP-BC  Pulmonology, Critical Care, and Sleep Medicine

## 2023-12-18 ENCOUNTER — OFFICE VISIT (OUTPATIENT)
Dept: SLEEP MEDICINE | Facility: HOSPITAL | Age: 48
End: 2023-12-18
Payer: COMMERCIAL

## 2023-12-18 VITALS
HEIGHT: 64 IN | HEART RATE: 90 BPM | DIASTOLIC BLOOD PRESSURE: 70 MMHG | BODY MASS INDEX: 38.76 KG/M2 | OXYGEN SATURATION: 98 % | SYSTOLIC BLOOD PRESSURE: 131 MMHG | WEIGHT: 227 LBS

## 2023-12-18 DIAGNOSIS — E66.9 OBESITY (BMI 30-39.9): ICD-10-CM

## 2023-12-18 DIAGNOSIS — G47.33 OSA (OBSTRUCTIVE SLEEP APNEA): Primary | ICD-10-CM

## 2024-01-11 ENCOUNTER — LAB (OUTPATIENT)
Dept: LAB | Facility: HOSPITAL | Age: 49
End: 2024-01-11
Payer: COMMERCIAL

## 2024-01-11 LAB
ALBUMIN SERPL-MCNC: 4.4 G/DL (ref 3.5–5.2)
ALBUMIN/GLOB SERPL: 1.6 G/DL
ALP SERPL-CCNC: 174 U/L (ref 39–117)
ALT SERPL W P-5'-P-CCNC: 16 U/L (ref 1–33)
ANION GAP SERPL CALCULATED.3IONS-SCNC: 11.1 MMOL/L (ref 5–15)
AST SERPL-CCNC: 22 U/L (ref 1–32)
BASOPHILS # BLD AUTO: 0.1 10*3/MM3 (ref 0–0.2)
BASOPHILS NFR BLD AUTO: 1.2 % (ref 0–1.5)
BILIRUB SERPL-MCNC: 0.3 MG/DL (ref 0–1.2)
BUN SERPL-MCNC: 11 MG/DL (ref 6–20)
BUN/CREAT SERPL: 14.9 (ref 7–25)
CALCIUM SPEC-SCNC: 9.4 MG/DL (ref 8.6–10.5)
CHLORIDE SERPL-SCNC: 105 MMOL/L (ref 98–107)
CO2 SERPL-SCNC: 25.9 MMOL/L (ref 22–29)
CREAT SERPL-MCNC: 0.74 MG/DL (ref 0.57–1)
DEPRECATED RDW RBC AUTO: 41.2 FL (ref 37–54)
EGFRCR SERPLBLD CKD-EPI 2021: 99.9 ML/MIN/1.73
EOSINOPHIL # BLD AUTO: 0.24 10*3/MM3 (ref 0–0.4)
EOSINOPHIL NFR BLD AUTO: 2.8 % (ref 0.3–6.2)
ERYTHROCYTE [DISTWIDTH] IN BLOOD BY AUTOMATED COUNT: 12.6 % (ref 12.3–15.4)
GLOBULIN UR ELPH-MCNC: 2.7 GM/DL
GLUCOSE SERPL-MCNC: 94 MG/DL (ref 65–99)
HCT VFR BLD AUTO: 39.3 % (ref 34–46.6)
HGB BLD-MCNC: 12.9 G/DL (ref 12–15.9)
IMM GRANULOCYTES # BLD AUTO: 0.02 10*3/MM3 (ref 0–0.05)
IMM GRANULOCYTES NFR BLD AUTO: 0.2 % (ref 0–0.5)
LYMPHOCYTES # BLD AUTO: 2.49 10*3/MM3 (ref 0.7–3.1)
LYMPHOCYTES NFR BLD AUTO: 29 % (ref 19.6–45.3)
MCH RBC QN AUTO: 29.6 PG (ref 26.6–33)
MCHC RBC AUTO-ENTMCNC: 32.8 G/DL (ref 31.5–35.7)
MCV RBC AUTO: 90.1 FL (ref 79–97)
MONOCYTES # BLD AUTO: 0.71 10*3/MM3 (ref 0.1–0.9)
MONOCYTES NFR BLD AUTO: 8.3 % (ref 5–12)
NEUTROPHILS NFR BLD AUTO: 5.03 10*3/MM3 (ref 1.7–7)
NEUTROPHILS NFR BLD AUTO: 58.5 % (ref 42.7–76)
NRBC BLD AUTO-RTO: 0 /100 WBC (ref 0–0.2)
PLATELET # BLD AUTO: 406 10*3/MM3 (ref 140–450)
PMV BLD AUTO: 10.3 FL (ref 6–12)
POTASSIUM SERPL-SCNC: 3.7 MMOL/L (ref 3.5–5.2)
PROT SERPL-MCNC: 7.1 G/DL (ref 6–8.5)
RBC # BLD AUTO: 4.36 10*6/MM3 (ref 3.77–5.28)
SODIUM SERPL-SCNC: 142 MMOL/L (ref 136–145)
WBC NRBC COR # BLD AUTO: 8.59 10*3/MM3 (ref 3.4–10.8)

## 2024-01-11 PROCEDURE — 80053 COMPREHEN METABOLIC PANEL: CPT | Performed by: INTERNAL MEDICINE

## 2024-01-11 PROCEDURE — 85025 COMPLETE CBC W/AUTO DIFF WBC: CPT | Performed by: INTERNAL MEDICINE

## 2024-01-24 ENCOUNTER — LAB (OUTPATIENT)
Dept: LAB | Facility: HOSPITAL | Age: 49
End: 2024-01-24
Payer: COMMERCIAL

## 2024-01-24 DIAGNOSIS — K50.119 CROHN'S DISEASE OF LARGE INTESTINE WITH COMPLICATION: ICD-10-CM

## 2024-01-24 PROCEDURE — 83993 ASSAY FOR CALPROTECTIN FECAL: CPT

## 2024-01-28 LAB — CALPROTECTIN STL-MCNT: 1360 UG/G (ref 0–120)

## 2024-01-29 NOTE — PROGRESS NOTES
I called and spoke to Debbie this morning.  Her fecal calprotectin is markedly elevated.  She is on a shorter interval of Stelara and at this point is asymptomatic.  She is having normal bowel movements no pain and no blood.  Will continue this therapy for now we will repeat her fecal calprotectin she is seen in the office in the next 2 to 3 months.  She knows to notify us if her symptoms intensify or change

## 2024-02-12 ENCOUNTER — LAB (OUTPATIENT)
Dept: INTERNAL MEDICINE | Facility: CLINIC | Age: 49
End: 2024-02-12
Payer: COMMERCIAL

## 2024-02-12 ENCOUNTER — OFFICE VISIT (OUTPATIENT)
Dept: INTERNAL MEDICINE | Facility: CLINIC | Age: 49
End: 2024-02-12
Payer: COMMERCIAL

## 2024-02-12 VITALS
BODY MASS INDEX: 37.73 KG/M2 | HEIGHT: 64 IN | RESPIRATION RATE: 18 BRPM | WEIGHT: 221 LBS | OXYGEN SATURATION: 99 % | TEMPERATURE: 98.4 F | DIASTOLIC BLOOD PRESSURE: 78 MMHG | HEART RATE: 79 BPM | SYSTOLIC BLOOD PRESSURE: 124 MMHG

## 2024-02-12 DIAGNOSIS — R42 VERTIGO: ICD-10-CM

## 2024-02-12 DIAGNOSIS — G47.33 OSA (OBSTRUCTIVE SLEEP APNEA): ICD-10-CM

## 2024-02-12 DIAGNOSIS — Z85.3 HISTORY OF BREAST CANCER: ICD-10-CM

## 2024-02-12 DIAGNOSIS — E55.9 VITAMIN D DEFICIENCY: ICD-10-CM

## 2024-02-12 DIAGNOSIS — E05.90 HYPERTHYROIDISM: ICD-10-CM

## 2024-02-12 DIAGNOSIS — R73.03 PREDIABETES: ICD-10-CM

## 2024-02-12 DIAGNOSIS — E05.90 HYPERTHYROIDISM: Primary | ICD-10-CM

## 2024-02-12 DIAGNOSIS — K50.111 CROHN'S DISEASE OF LARGE INTESTINE WITH RECTAL BLEEDING: ICD-10-CM

## 2024-02-12 PROBLEM — Z91.89 AT RISK FOR SLEEP APNEA: Status: RESOLVED | Noted: 2023-08-03 | Resolved: 2024-02-12

## 2024-02-12 LAB
25(OH)D3 SERPL-MCNC: 13.1 NG/ML (ref 30–100)
HBA1C MFR BLD: 5.8 % (ref 4.8–5.6)
TSH SERPL DL<=0.05 MIU/L-ACNC: 1.12 UIU/ML (ref 0.27–4.2)

## 2024-02-12 PROCEDURE — 84443 ASSAY THYROID STIM HORMONE: CPT | Performed by: PHYSICIAN ASSISTANT

## 2024-02-12 PROCEDURE — 83036 HEMOGLOBIN GLYCOSYLATED A1C: CPT | Performed by: PHYSICIAN ASSISTANT

## 2024-02-12 PROCEDURE — 82306 VITAMIN D 25 HYDROXY: CPT | Performed by: PHYSICIAN ASSISTANT

## 2024-02-12 PROCEDURE — 36415 COLL VENOUS BLD VENIPUNCTURE: CPT | Performed by: PHYSICIAN ASSISTANT

## 2024-02-12 PROCEDURE — 99214 OFFICE O/P EST MOD 30 MIN: CPT | Performed by: PHYSICIAN ASSISTANT

## 2024-02-14 RX ORDER — ERGOCALCIFEROL 1.25 MG/1
50000 CAPSULE ORAL WEEKLY
Qty: 12 CAPSULE | Refills: 1 | Status: SHIPPED | OUTPATIENT
Start: 2024-02-14

## 2024-03-22 DIAGNOSIS — K50.919 CROHN'S DISEASE WITH COMPLICATION, UNSPECIFIED GASTROINTESTINAL TRACT LOCATION: ICD-10-CM

## 2024-03-22 RX ORDER — USTEKINUMAB 90 MG/ML
INJECTION, SOLUTION SUBCUTANEOUS
Qty: 1 EACH | Refills: 5 | Status: SHIPPED | OUTPATIENT
Start: 2024-03-22

## 2024-03-22 NOTE — TELEPHONE ENCOUNTER
Rx Refill Note  Requested Prescriptions     Pending Prescriptions Disp Refills    Stelara 90 MG/ML solution prefilled syringe Injection [Pharmacy Med Name: STELARA PFS 90MG/1ML] 1 each 5     Sig: INJECT 1 SYRINGE UNDER THE SKIN EVERY 30 DAYS      Last office visit with prescribing clinician: 10/3/2023   Last telemedicine visit with prescribing clinician: Visit date not found   Next office visit with prescribing clinician: 4/2/2024                         Would you like a call back once the refill request has been completed: [] Yes [] No    If the office needs to give you a call back, can they leave a voicemail: [] Yes [] No    Meenu Agarwal, A  03/22/24, 08:36 EDT

## 2024-04-02 ENCOUNTER — OFFICE VISIT (OUTPATIENT)
Dept: GASTROENTEROLOGY | Facility: CLINIC | Age: 49
End: 2024-04-02
Payer: COMMERCIAL

## 2024-04-02 VITALS
HEART RATE: 90 BPM | HEIGHT: 64 IN | OXYGEN SATURATION: 99 % | SYSTOLIC BLOOD PRESSURE: 120 MMHG | TEMPERATURE: 97.3 F | WEIGHT: 220 LBS | DIASTOLIC BLOOD PRESSURE: 78 MMHG | BODY MASS INDEX: 37.56 KG/M2

## 2024-04-02 DIAGNOSIS — K50.10 CROHN'S DISEASE OF LARGE INTESTINE WITHOUT COMPLICATION: Primary | ICD-10-CM

## 2024-04-02 DIAGNOSIS — Z85.3 HISTORY OF BREAST CANCER: ICD-10-CM

## 2024-04-02 PROCEDURE — 99213 OFFICE O/P EST LOW 20 MIN: CPT | Performed by: INTERNAL MEDICINE

## 2024-04-02 NOTE — PROGRESS NOTES
Patient Name: Debbie Clemens  YOB: 1975   Medical Record number: [id number]     Chief Complaint: Crohn's disease      HPI Debbie is an established patient of mine.  She has had known Crohn's disease she was a child.  She was most recently switched to Stelara after having Entyvio therapy.  Back late last year he checked fecal calprotectin.  She had had cramps and her bowel frequency had increased.  We changed her Stelara injection regimen from 8 weeks to 4 weeks.  She is here today for follow-up.  She currently is also using her CPAP machine.  Continues to actively try to lose weight.  She currently is asymptomatic.  It seems that the reduction in frequency of the Stelara injections has laminated her GI symptoms.    Past Medical History:   Past Medical History:   Diagnosis Date    Abnormal EKG     Breast cancer     left    Crohn's disease     age 13    Fatty liver 1/13/2023    Heartburn     tums prn     Hyperthyroidism     Hypothyroidism 07/2021    Irritable bowel syndrome     YE (obstructive sleep apnea) 9/14/2023    Slow to wake up after anesthesia     for colonoscopy - d and c was fine     Vitamin D deficiency 8/3/2023    Wears glasses        Family History:  Family History   Problem Relation Age of Onset    Hypertension Mother         High blood pressure    Heart disease Father     Heart attack Father         3 heart attacks    Hypertension Other     Heart disease Other     Hyperlipidemia Other     Ovarian cancer Neg Hx     Breast cancer Neg Hx        Social History:   reports that she quit smoking about 14 years ago. Her smoking use included cigarettes. She started smoking about 24 years ago. She has a 7.5 pack-year smoking history. She has never used smokeless tobacco. She reports current alcohol use of about 4.0 standard drinks of alcohol per week. She reports that she does not use drugs.    Medications:     Current Outpatient Medications:     cyanocobalamin 1000 MCG/ML injection, Inject 1 mL  "into the appropriate muscle as directed by prescriber Every 30 (Thirty) Days., Disp: 1 mL, Rfl: 12    fluticasone (Flonase) 50 MCG/ACT nasal spray, 2 sprays into the nostril(s) as directed by provider Daily., Disp: 16 g, Rfl: 2    loratadine (CLARITIN) 10 MG tablet, Take 1 tablet by mouth Daily., Disp: , Rfl:     multivitamin with minerals tablet tablet, Take 1 tablet by mouth Daily., Disp: , Rfl:     Stelara 90 MG/ML solution prefilled syringe Injection, INJECT 1 SYRINGE UNDER THE SKIN EVERY 30 DAYS, Disp: 1 each, Rfl: 5    vitamin D (ERGOCALCIFEROL) 1.25 MG (67126 UT) capsule capsule, Take 1 capsule by mouth 1 (One) Time Per Week., Disp: 12 capsule, Rfl: 1    Allergies:  Lialda [mesalamine], Nuts, Prilosec [omeprazole], and Epinephrine    Review of Systems   Constitutional:  Negative for activity change, appetite change, fatigue, fever and unexpected weight change.   HENT:  Negative for hearing loss, trouble swallowing and voice change.    Eyes:  Negative for visual disturbance.   Respiratory:  Negative for cough, choking, chest tightness and shortness of breath.    Cardiovascular:  Negative for chest pain.   Gastrointestinal:  Negative for abdominal distention, abdominal pain, anal bleeding, blood in stool, constipation, diarrhea, nausea, rectal pain and vomiting.   Endocrine: Negative for polydipsia and polyphagia.   Genitourinary: Negative.    Musculoskeletal:  Negative for gait problem and joint swelling.   Skin:  Negative for color change and rash.   Allergic/Immunologic: Negative for food allergies.   Neurological:  Negative for dizziness, seizures and speech difficulty.   Hematological:  Negative for adenopathy.   Psychiatric/Behavioral:  Negative for confusion.             Vitals:   /78 (BP Location: Right arm, Patient Position: Sitting, Cuff Size: Adult)   Pulse 90   Temp 97.3 °F (36.3 °C) (Temporal)   Ht 162.6 cm (64.02\")   Wt 99.8 kg (220 lb)   LMP 02/05/2021 (Approximate)   SpO2 99%   BMI " 37.74 kg/m²      Physical Exam:   Constitutional: Pt is oriented to person, place, and time and well-developed, well-nourished, and in no distress.     Abdominal: Soft. Bowel sounds are normal.   Skin: Skin is warm and dry.   Psychiatric: Mood, memory, affect and judgment normal.           Assessment and Plan   Debbie is asymptomatic from a GI standpoint right now.  I have asked her to be previous with regards to her injections every 4 weeks.  Sometimes she stretches it to 5 weeks.  Very happy with her response.  She knows to contact us if she develops problems.  Will see her back in 6 months time.  At least 20 minutes of time was spent before during and after the visit and appropriate [id number] was billed      ICD-10-CM ICD-9-CM   1. Crohn's disease of large intestine without complication  K50.10 555.1   2. History of breast cancer  Z85.3 V10.3     Sergey Grant M.D.  [facility]   Please note that portions of this note were completed with a voice recognition program.

## 2024-06-13 ENCOUNTER — PRIOR AUTHORIZATION (OUTPATIENT)
Dept: GASTROENTEROLOGY | Facility: CLINIC | Age: 49
End: 2024-06-13
Payer: COMMERCIAL

## 2024-06-13 NOTE — TELEPHONE ENCOUNTER
Key: [id number] - PA Case ID: [id number] help? Call us at [phone]  Status  Sent to [facility]  Drug  Stelara 90MG/ML syringes  Form  [place] [employer] Electronic PA Form (2017 NCPDP)

## 2024-08-12 ENCOUNTER — OFFICE VISIT (OUTPATIENT)
Dept: INTERNAL MEDICINE | Facility: CLINIC | Age: 49
End: 2024-08-12
Payer: COMMERCIAL

## 2024-08-12 ENCOUNTER — LAB (OUTPATIENT)
Dept: INTERNAL MEDICINE | Facility: CLINIC | Age: 49
End: 2024-08-12
Payer: COMMERCIAL

## 2024-08-12 VITALS
SYSTOLIC BLOOD PRESSURE: 118 MMHG | BODY MASS INDEX: 38.76 KG/M2 | HEART RATE: 82 BPM | HEIGHT: 64 IN | TEMPERATURE: 97.3 F | OXYGEN SATURATION: 98 % | DIASTOLIC BLOOD PRESSURE: 74 MMHG | RESPIRATION RATE: 20 BRPM | WEIGHT: 227 LBS

## 2024-08-12 DIAGNOSIS — K50.90 CROHN'S DISEASE WITHOUT COMPLICATION, UNSPECIFIED GASTROINTESTINAL TRACT LOCATION: ICD-10-CM

## 2024-08-12 DIAGNOSIS — E05.90 HYPERTHYROIDISM: ICD-10-CM

## 2024-08-12 DIAGNOSIS — R73.03 PREDIABETES: Primary | ICD-10-CM

## 2024-08-12 DIAGNOSIS — D17.1 LIPOMA OF BACK: ICD-10-CM

## 2024-08-12 DIAGNOSIS — R74.8 ELEVATED ALKALINE PHOSPHATASE LEVEL: ICD-10-CM

## 2024-08-12 DIAGNOSIS — J40 BRONCHITIS: ICD-10-CM

## 2024-08-12 LAB
ALBUMIN SERPL-MCNC: 4.1 G/DL (ref 3.5–5.2)
ALBUMIN/GLOB SERPL: 1.4 G/DL
ALP SERPL-CCNC: 147 U/L (ref 39–117)
ALT SERPL W P-5'-P-CCNC: 16 U/L (ref 1–33)
ANION GAP SERPL CALCULATED.3IONS-SCNC: 9 MMOL/L (ref 5–15)
AST SERPL-CCNC: 18 U/L (ref 1–32)
BILIRUB SERPL-MCNC: 0.2 MG/DL (ref 0–1.2)
BUN SERPL-MCNC: 8 MG/DL (ref 6–20)
BUN/CREAT SERPL: 10.8 (ref 7–25)
CALCIUM SPEC-SCNC: 9.4 MG/DL (ref 8.6–10.5)
CHLORIDE SERPL-SCNC: 105 MMOL/L (ref 98–107)
CO2 SERPL-SCNC: 24 MMOL/L (ref 22–29)
CREAT SERPL-MCNC: 0.74 MG/DL (ref 0.57–1)
EGFRCR SERPLBLD CKD-EPI 2021: 99.3 ML/MIN/1.73
EXPIRATION DATE: NORMAL
EXPIRATION DATE: NORMAL
FLUAV RNA RESP QL NAA+PROBE: NOT DETECTED
FLUBV RNA RESP QL NAA+PROBE: NOT DETECTED
GLOBULIN UR ELPH-MCNC: 3 GM/DL
GLUCOSE SERPL-MCNC: 103 MG/DL (ref 65–99)
HBA1C MFR BLD: 5.7 % (ref 4.5–5.7)
INTERNAL CONTROL: NORMAL
Lab: NORMAL
Lab: NORMAL
POTASSIUM SERPL-SCNC: 3.8 MMOL/L (ref 3.5–5.2)
PROT SERPL-MCNC: 7.1 G/DL (ref 6–8.5)
SARS-COV-2 RNA RESP QL NAA+PROBE: NOT DETECTED
SODIUM SERPL-SCNC: 138 MMOL/L (ref 136–145)
TSH SERPL DL<=0.05 MIU/L-ACNC: 1.34 UIU/ML (ref 0.27–4.2)

## 2024-08-12 PROCEDURE — 36415 COLL VENOUS BLD VENIPUNCTURE: CPT | Performed by: PHYSICIAN ASSISTANT

## 2024-08-12 PROCEDURE — 80053 COMPREHEN METABOLIC PANEL: CPT | Performed by: PHYSICIAN ASSISTANT

## 2024-08-12 PROCEDURE — 84443 ASSAY THYROID STIM HORMONE: CPT | Performed by: PHYSICIAN ASSISTANT

## 2024-08-12 RX ORDER — DOXYCYCLINE 100 MG/1
100 TABLET ORAL 2 TIMES DAILY
Qty: 14 TABLET | Refills: 0 | Status: SHIPPED | OUTPATIENT
Start: 2024-08-12 | End: 2024-08-19

## 2024-08-12 RX ORDER — ALBUTEROL SULFATE 90 UG/1
2 AEROSOL, METERED RESPIRATORY (INHALATION) EVERY 4 HOURS PRN
Qty: 8 G | Refills: 0 | Status: SHIPPED | OUTPATIENT
Start: 2024-08-12

## 2024-08-12 RX ORDER — ERGOCALCIFEROL 1.25 MG/1
50000 CAPSULE ORAL WEEKLY
Qty: 12 CAPSULE | Refills: 1 | Status: SHIPPED | OUTPATIENT
Start: 2024-08-12

## 2024-08-12 NOTE — PROGRESS NOTES
Chief Complaint  Prediabetes and Skin Problem (Requests referral to derm )    Subjective          History of Present Illness  Debbie Clemens presents to [facility] for   History of Present Illness  Bronchitis:  Has been sick for the last 3-4 d with cough, sore throat, low grade fevers, ear pain, no body aches or h/a, no n/v/d. No sick contacts. She has a productive cough.    Lipoma:  Located on right upper back, sometimes she will have shooting pain with arm or wrist movements. Not sure if the lipoma is pushing on a nerve. Worse if she sleeps on her side. Interested in getting it removed.    Hx Hyperthyroid:  Followed by endo, was prev on methimazole. Had elevated alk phos which was thought to be related to hyperthyroid.     Hx Breast CA:  Had total hysterectomy May 2021 due to prolonged periods and breast cancer hx.  Her breast CA was stage III and it was dx due to lump in her arm pit. Was seeing Dr Yuen for oncology. Had double mastectomy in Jan 2014. Was planning on reconstruction at some point.     Tachycardia:  Followed by cardio. Had normal Echo. Elevated HR likely related to thyroid dz.   No CP, SOA, edema, diaphoresis. Has not had hx of HTN. Father had AMI, first one at age 50s, no fhx of stroke. HR has been up to 120 at rest.     Crohns dz:  Followed by Dr Grant for 30 years, doing ok with Stelara but having elevated fecal calprotectin, no diarrhea recently. Takes vit B12 inj. Used to be on prednisone long term for this. Was prev on Entyvio and had done better on this so may be restarting this.    Sleep Apnea:  Is doing well with CPAP        The following portions of the patient's history were reviewed and updated as appropriate: allergies, current medications, past family history, past medical history, past social history, past surgical history and problem list.  Allergies   Allergen Reactions    Lialda [Mesalamine] GI Intolerance     Worse symptoms of crohns    Nuts  Swelling     TREE NUTS, makes tongue swell    Prilosec [Omeprazole] Diarrhea    Epinephrine Nausea Only     Jittery and nausea       Current Outpatient Medications:     cyanocobalamin 1000 MCG/ML injection, Inject 1 mL into the appropriate muscle as directed by prescriber Every 30 (Thirty) Days., Disp: 1 mL, Rfl: 12    fluticasone (Flonase) 50 MCG/ACT nasal spray, 2 sprays into the nostril(s) as directed by provider Daily., Disp: 16 g, Rfl: 2    loratadine (CLARITIN) 10 MG tablet, Take 1 tablet by mouth Daily., Disp: , Rfl:     multivitamin with minerals tablet tablet, Take 1 tablet by mouth Daily., Disp: , Rfl:     Stelara 90 MG/ML solution prefilled syringe Injection, INJECT 1 SYRINGE UNDER THE SKIN EVERY 30 DAYS, Disp: 1 each, Rfl: 5    albuterol sulfate  (90 Base) MCG/ACT inhaler, Inhale 2 puffs Every 4 (Four) Hours As Needed for Wheezing., Disp: 8 g, Rfl: 0    azithromycin (Zithromax Z-Get) 250 MG tablet, Take 2 tablets by mouth on day 1, then 1 tablet daily on days 2-5, Disp: 6 tablet, Rfl: 0    vitamin D (ERGOCALCIFEROL) 1.25 MG (56902 UT) capsule capsule, TAKE 1 CAPSULE BY MOUTH 1 TIME EVERY WEEK, Disp: 12 capsule, Rfl: 1  New Medications Ordered This Visit   Medications    doxycycline (ADOXA) 100 MG tablet     Sig: Take 1 tablet by mouth 2 (Two) Times a Day for 7 days.     Dispense:  14 tablet     Refill:  0    albuterol sulfate  (90 Base) MCG/ACT inhaler     Sig: Inhale 2 puffs Every 4 (Four) Hours As Needed for Wheezing.     Dispense:  8 g     Refill:  0     Social History     Tobacco Use   Smoking Status Former    Current packs/day: 0.00    Average packs/day: 0.8 packs/day for 10.0 years (7.5 ttl pk-yrs)    Types: Cigarettes    Start date: 2000    Quit date: 2010    Years since quittin.6   Smokeless Tobacco Never        Objective   Vital Signs:   Vitals:    24 0807   BP: 118/74   Pulse: 82   Resp: 20   Temp: 97.3 °F (36.3 °C)   TempSrc: Temporal   SpO2: 98%   Weight: 103  "kg (227 lb)   Height: 162.6 cm (64.02\")   PainSc:   3   PainLoc: Rib Cage      Body mass index is 38.94 kg/m².  Physical Exam  Vitals reviewed.   Constitutional:       General: She is not in acute distress.     Appearance: Normal appearance.   HENT:      Head: Normocephalic and atraumatic.      Right Ear: Tympanic membrane, ear canal and external ear normal.      Left Ear: Tympanic membrane, ear canal and external ear normal.      Nose: Nose normal.      Mouth/Throat:      Mouth: Mucous membranes are moist.      Pharynx: No oropharyngeal exudate or posterior oropharyngeal erythema.   Eyes:      General: No scleral icterus.     Extraocular Movements: Extraocular movements intact.      Conjunctiva/sclera: Conjunctivae normal.   Cardiovascular:      Rate and Rhythm: Normal rate and regular rhythm.      Heart sounds: Normal heart sounds. No murmur heard.  Pulmonary:      Effort: Pulmonary effort is normal. No respiratory distress.      Breath sounds: No stridor.   Musculoskeletal:      Cervical back: Normal range of motion and neck supple.   Skin:     General: Skin is warm and dry.      Coloration: Skin is not jaundiced.      Comments: 6cm lipoma upper right back   Neurological:      General: No focal deficit present.      Mental Status: She is alert and oriented to person, place, and time.      Gait: Gait normal.   Psychiatric:         Mood and Affect: Mood normal.         Behavior: Behavior normal.        Patient's last menstrual period was 02/05/2021 (approximate).   Class 2 Severe Obesity (BMI >=35 and <=39.9). Obesity-related health conditions include the following: none. Obesity is unchanged. BMI is is above average; BMI management plan is completed. We discussed portion control and increasing exercise.        Result Review :                   Assessment and Plan    Diagnoses and all orders for this visit:    1. Prediabetes (Primary)  Assessment & Plan:  Lab Results   Component Value Date    HGBA1C 5.7 08/12/2024 "     Cont diet/exercise    Orders:  -     POC Glycosylated Hemoglobin (Hb A1C)    2. Bronchitis  Assessment & Plan:  Supportive care, stay hydrated, rest.  Follow up if symptoms worsen or persist. Monitor for new symptoms. Go to the ER for respiratory distress, dehydration, or severe symptoms.  Treat with abx.     Orders:  -     Covid-19 + Flu A&B AG, Veritor  -     doxycycline (ADOXA) 100 MG tablet; Take 1 tablet by mouth 2 (Two) Times a Day for 7 days.  Dispense: 14 tablet; Refill: 0  -     albuterol sulfate  (90 Base) MCG/ACT inhaler; Inhale 2 puffs Every 4 (Four) Hours As Needed for Wheezing.  Dispense: 8 g; Refill: 0    3. Lipoma of back  Assessment & Plan:  Refer to surgery to discuss removal.    Orders:  -     Ambulatory Referral to General Surgery    4. Crohn's disease without complication, unspecified gastrointestinal tract location  Assessment & Plan:  Chronic, stable, f/u with GI as dir    Orders:  -     Comprehensive Metabolic Panel; Future    5. Hyperthyroidism  Assessment & Plan:  Check TSH, cont off methimazole at this time. F/u with endo if has abn TSH. Will check q 3-6 mo, monitor for new sx such as fatigue or f/u if tachycardia returns.    Orders:  -     TSH Rfx On Abnormal To Free T4; Future    6. Elevated alkaline phosphatase level  Assessment & Plan:  Cont to monitor, bone scan was nl (d/t hx of breast CA)          Follow Up   Return in about 6 months (around 2/12/2025) for Yearly Physical, Chronic Conditions.    Follow up if symptoms worsen or persist or has new or concerning symptoms, go to ER for severe symptoms.   Reviewed common medication effects and side effects and advised to report side effects immediately.  Encouraged medication compliance and the importance of keeping scheduled follow up appointments with me and any other providers.  If a referral was made please contact our office if you have not heard about an appointment in the next 2 weeks.   If labs or images are ordered we  will contact you with the results within the next week.  If you have not heard from us after a week please call our office to inquire about the results.   Patient was given instructions and counseling regarding her condition or for health maintenance advice. Please see specific information pulled into the AVS if appropriate.     Krista Don PA-C    * Please note that portions of this note were completed with a voice recognition program.

## 2024-08-13 RX ORDER — AZITHROMYCIN 250 MG/1
TABLET, FILM COATED ORAL
Qty: 6 TABLET | Refills: 0 | Status: SHIPPED | OUTPATIENT
Start: 2024-08-13

## 2024-08-25 NOTE — ASSESSMENT & PLAN NOTE
Supportive care, stay hydrated, rest.  Follow up if symptoms worsen or persist. Monitor for new symptoms. Go to the ER for respiratory distress, dehydration, or severe symptoms.  Treat with abx.

## 2024-09-24 DIAGNOSIS — K50.919 CROHN'S DISEASE WITH COMPLICATION, UNSPECIFIED GASTROINTESTINAL TRACT LOCATION: ICD-10-CM

## 2024-09-24 RX ORDER — USTEKINUMAB 90 MG/ML
INJECTION, SOLUTION SUBCUTANEOUS
Qty: 1 EACH | Refills: 5 | Status: SHIPPED | OUTPATIENT
Start: 2024-09-24

## 2024-10-01 ENCOUNTER — OFFICE VISIT (OUTPATIENT)
Dept: GASTROENTEROLOGY | Facility: CLINIC | Age: 49
End: 2024-10-01
Payer: COMMERCIAL

## 2024-10-01 VITALS
DIASTOLIC BLOOD PRESSURE: 86 MMHG | TEMPERATURE: 98 F | BODY MASS INDEX: 38.76 KG/M2 | HEART RATE: 91 BPM | HEIGHT: 64 IN | SYSTOLIC BLOOD PRESSURE: 118 MMHG | WEIGHT: 227 LBS | OXYGEN SATURATION: 97 %

## 2024-10-01 DIAGNOSIS — K50.919 CROHN'S DISEASE WITH COMPLICATION, UNSPECIFIED GASTROINTESTINAL TRACT LOCATION: ICD-10-CM

## 2024-10-01 DIAGNOSIS — K50.119 CROHN'S DISEASE OF LARGE INTESTINE WITH COMPLICATION: Primary | ICD-10-CM

## 2024-10-01 PROCEDURE — 99214 OFFICE O/P EST MOD 30 MIN: CPT | Performed by: INTERNAL MEDICINE

## 2024-10-01 RX ORDER — USTEKINUMAB 90 MG/ML
90 INJECTION, SOLUTION SUBCUTANEOUS
Qty: 1 EACH | Refills: 11 | Status: SHIPPED | OUTPATIENT
Start: 2024-10-01

## 2024-10-01 NOTE — PROGRESS NOTES
Patient Name: Debbie Clemens  YOB: 1975   Medical Record number: [id number]     Chief Complaint: Crohn's disease      HPI Debbie is an established patient of mine.  She has had Crohn's disease since childhood.  She previously had been on Entyvio.  She failed this and was switched to Stelara in January 2023.  The frequency of her dosing was increased later that year because of symptoms and a fecal calprotectin that was markedly elevated.  She continues to do well and is asymptomatic on the Stelara.  She is to undergo a outpatient surgical procedure with the removal of a lipoma from her back.  She otherwise again has been doing quite well.  She continues to try to lose weight.  Again she is having no diarrhea no blood in her stool and overall feels well.  He would like to decrease the frequency of her Stelara.  We discussed this in detail today.    Past Medical History:   Past Medical History:   Diagnosis Date    Abnormal EKG     Breast cancer     left    Crohn's disease     age 13    Fatty liver 1/13/2023    Heartburn     tums prn     Hyperthyroidism     Hypothyroidism 07/2021    Irritable bowel syndrome     YE (obstructive sleep apnea) 9/14/2023    Slow to wake up after anesthesia     for colonoscopy - d and c was fine     Vitamin D deficiency 8/3/2023    Wears glasses        Family History:  Family History   Problem Relation Age of Onset    Hypertension Mother         High blood pressure    Heart disease Father     Heart attack Father         3 heart attacks    Hypertension Other     Heart disease Other     Hyperlipidemia Other     Ovarian cancer Neg Hx     Breast cancer Neg Hx        Social History:   reports that she quit smoking about 14 years ago. Her smoking use included cigarettes. She started smoking about 24 years ago. She has a 7.5 pack-year smoking history. She has never used smokeless tobacco. She reports current alcohol use of about 4.0 standard drinks of alcohol per week. She reports  "that she does not use drugs.    Medications:     Current Outpatient Medications:     cyanocobalamin 1000 MCG/ML injection, Inject 1 mL into the appropriate muscle as directed by prescriber Every 30 (Thirty) Days., Disp: 1 mL, Rfl: 12    fluticasone (Flonase) 50 MCG/ACT nasal spray, 2 sprays into the nostril(s) as directed by provider Daily., Disp: 16 g, Rfl: 2    loratadine (CLARITIN) 10 MG tablet, Take 1 tablet by mouth Daily., Disp: , Rfl:     multivitamin with minerals (MULTIPLE VITAMINS/WOMENS PO), , Disp: , Rfl:     multivitamin with minerals tablet tablet, Take 1 tablet by mouth Daily., Disp: , Rfl:     vitamin D (ERGOCALCIFEROL) 1.25 MG (22055 UT) capsule capsule, TAKE 1 CAPSULE BY MOUTH 1 TIME EVERY WEEK, Disp: 12 capsule, Rfl: 1    Ustekinumab (Stelara) 90 MG/ML solution prefilled syringe Injection, Inject 90 mg as directed Every 28 (Twenty-Eight) Days., Disp: 1 each, Rfl: 11    Allergies:  Lialda [mesalamine], Nuts, Prilosec [omeprazole], and Epinephrine    Review of Systems         Vitals:   /86   Pulse 91   Temp 98 °F (36.7 °C)   Ht 162.6 cm (64.02\")   Wt 103 kg (227 lb)   LMP 02/05/2021 (Approximate)   SpO2 97%   BMI 38.94 kg/m²      Physical Exam:   Constitutional: Pt is oriented to person, place, and time and well-developed, well-nourished, and in no distress.   HENT: Mouth/Throat: Oropharynx is clear and moist.     Abdominal: Soft. Bowel sounds are normal.   Skin: Skin is warm and dry.   Psychiatric: Mood, memory, affect and judgment normal.           Assessment and Plan Debbie has had Crohn's disease for over 30 years.  She wanted to decrease the frequency of her Stelara.  I have discouraged her from doing this.  We had to increase it because of symptoms and an elevated fecal calprotectin.  I do not want her failing an additional biologic and stressed to her the need to continue the shorter frequency.  I will see her back in 6 months time she is due for screening laboratories prior to " the lipoma removal.  All of her questions were answered.  Stelara will be refilled.  At least 30 minutes of time was spent before during and after the visit and appropriate [id number] was billed        ICD-10-CM ICD-9-CM   1. Crohn's disease of large intestine with complication  K50.119 555.1     Sergey Grant M.D.  [facility]   Please note that portions of this note were completed with a voice recognition program.

## 2024-10-10 ENCOUNTER — TRANSCRIBE ORDERS (OUTPATIENT)
Dept: LAB | Facility: HOSPITAL | Age: 49
End: 2024-10-10
Payer: COMMERCIAL

## 2024-10-10 ENCOUNTER — LAB (OUTPATIENT)
Dept: LAB | Facility: HOSPITAL | Age: 49
End: 2024-10-10
Payer: COMMERCIAL

## 2024-10-10 DIAGNOSIS — R22.2 MASS IN CHEST: ICD-10-CM

## 2024-10-10 DIAGNOSIS — R22.2 MASS IN CHEST: Primary | ICD-10-CM

## 2024-10-10 LAB
ANION GAP SERPL CALCULATED.3IONS-SCNC: 12.6 MMOL/L (ref 5–15)
BASOPHILS # BLD AUTO: 0.1 10*3/MM3 (ref 0–0.2)
BASOPHILS NFR BLD AUTO: 1 % (ref 0–1.5)
BUN SERPL-MCNC: 13 MG/DL (ref 6–20)
BUN/CREAT SERPL: 16.3 (ref 7–25)
CALCIUM SPEC-SCNC: 9.3 MG/DL (ref 8.6–10.5)
CHLORIDE SERPL-SCNC: 102 MMOL/L (ref 98–107)
CO2 SERPL-SCNC: 25.4 MMOL/L (ref 22–29)
CREAT SERPL-MCNC: 0.8 MG/DL (ref 0.57–1)
DEPRECATED RDW RBC AUTO: 44 FL (ref 37–54)
EGFRCR SERPLBLD CKD-EPI 2021: 90.5 ML/MIN/1.73
EOSINOPHIL # BLD AUTO: 0.28 10*3/MM3 (ref 0–0.4)
EOSINOPHIL NFR BLD AUTO: 2.8 % (ref 0.3–6.2)
ERYTHROCYTE [DISTWIDTH] IN BLOOD BY AUTOMATED COUNT: 12.9 % (ref 12.3–15.4)
GLUCOSE SERPL-MCNC: 84 MG/DL (ref 65–99)
HCT VFR BLD AUTO: 40.8 % (ref 34–46.6)
HGB BLD-MCNC: 13.4 G/DL (ref 12–15.9)
IMM GRANULOCYTES # BLD AUTO: 0.03 10*3/MM3 (ref 0–0.05)
IMM GRANULOCYTES NFR BLD AUTO: 0.3 % (ref 0–0.5)
LYMPHOCYTES # BLD AUTO: 3.25 10*3/MM3 (ref 0.7–3.1)
LYMPHOCYTES NFR BLD AUTO: 32.4 % (ref 19.6–45.3)
MCH RBC QN AUTO: 30.9 PG (ref 26.6–33)
MCHC RBC AUTO-ENTMCNC: 32.8 G/DL (ref 31.5–35.7)
MCV RBC AUTO: 94 FL (ref 79–97)
MONOCYTES # BLD AUTO: 0.64 10*3/MM3 (ref 0.1–0.9)
MONOCYTES NFR BLD AUTO: 6.4 % (ref 5–12)
NEUTROPHILS NFR BLD AUTO: 5.73 10*3/MM3 (ref 1.7–7)
NEUTROPHILS NFR BLD AUTO: 57.1 % (ref 42.7–76)
NRBC BLD AUTO-RTO: 0 /100 WBC (ref 0–0.2)
PLATELET # BLD AUTO: 362 10*3/MM3 (ref 140–450)
PMV BLD AUTO: 10.8 FL (ref 6–12)
POTASSIUM SERPL-SCNC: 4.2 MMOL/L (ref 3.5–5.2)
RBC # BLD AUTO: 4.34 10*6/MM3 (ref 3.77–5.28)
SODIUM SERPL-SCNC: 140 MMOL/L (ref 136–145)
WBC NRBC COR # BLD AUTO: 10.03 10*3/MM3 (ref 3.4–10.8)

## 2024-10-10 PROCEDURE — 36415 COLL VENOUS BLD VENIPUNCTURE: CPT

## 2024-10-10 PROCEDURE — 80048 BASIC METABOLIC PNL TOTAL CA: CPT

## 2024-10-10 PROCEDURE — 85025 COMPLETE CBC W/AUTO DIFF WBC: CPT

## 2024-10-17 ENCOUNTER — LAB REQUISITION (OUTPATIENT)
Dept: LAB | Facility: HOSPITAL | Age: 49
End: 2024-10-17
Payer: COMMERCIAL

## 2024-10-17 DIAGNOSIS — R22.2 LOCALIZED SWELLING, MASS AND LUMP, TRUNK: ICD-10-CM

## 2024-10-17 PROCEDURE — 88304 TISSUE EXAM BY PATHOLOGIST: CPT | Performed by: SURGERY

## 2024-12-19 ENCOUNTER — OFFICE VISIT (OUTPATIENT)
Dept: SLEEP MEDICINE | Facility: CLINIC | Age: 49
End: 2024-12-19
Payer: COMMERCIAL

## 2024-12-19 VITALS
DIASTOLIC BLOOD PRESSURE: 78 MMHG | TEMPERATURE: 97.7 F | HEART RATE: 80 BPM | SYSTOLIC BLOOD PRESSURE: 130 MMHG | WEIGHT: 235 LBS | BODY MASS INDEX: 40.12 KG/M2 | HEIGHT: 64 IN | OXYGEN SATURATION: 98 %

## 2024-12-19 DIAGNOSIS — G47.33 OSA (OBSTRUCTIVE SLEEP APNEA): Primary | ICD-10-CM

## 2024-12-19 PROCEDURE — 99213 OFFICE O/P EST LOW 20 MIN: CPT | Performed by: NURSE PRACTITIONER

## 2024-12-19 NOTE — PROGRESS NOTES
Chief Complaint:   Chief Complaint   Patient presents with    Follow-up    Sleep Apnea       HPI:    Debbie Clemens is a 49 y.o. female here for follow-up of sleep apnea.  Patient was last seen 12/18/2023.  Patient continues to do well with CPAP therapy.  Patient is sleeping 6 to 8 hours nightly and feels rested upon awakening.  She will usually go to sleep on her couch while watching TV and then get up and go to her bed.  She does go to sleep easily.  She has an [place] score of 5/24.  She does well with nasal mask and standard tubing.  Patient has no concerns or complaints and will continue therapy.        Current medications are:   Current Outpatient Medications:     cyanocobalamin 1000 MCG/ML injection, Inject 1 mL into the appropriate muscle as directed by prescriber Every 30 (Thirty) Days., Disp: 1 mL, Rfl: 12    fluticasone (Flonase) 50 MCG/ACT nasal spray, 2 sprays into the nostril(s) as directed by provider Daily., Disp: 16 g, Rfl: 2    loratadine (CLARITIN) 10 MG tablet, Take 1 tablet by mouth Daily., Disp: , Rfl:     multivitamin with minerals (MULTIPLE VITAMINS/WOMENS PO), , Disp: , Rfl:     multivitamin with minerals tablet tablet, Take 1 tablet by mouth Daily., Disp: , Rfl:     Ustekinumab (Stelara) 90 MG/ML solution prefilled syringe Injection, Inject 90 mg as directed Every 28 (Twenty-Eight) Days., Disp: 1 each, Rfl: 11    vitamin D (ERGOCALCIFEROL) 1.25 MG (38092 UT) capsule capsule, TAKE 1 CAPSULE BY MOUTH 1 TIME EVERY WEEK, Disp: 12 capsule, Rfl: 1.      The patient's relevant past medical, surgical, family and social history were reviewed and updated in Epic as appropriate.       Review of Systems   HENT:  Positive for congestion.    Eyes:  Positive for visual disturbance.   Respiratory:  Positive for apnea.    Cardiovascular:  Positive for palpitations.   Allergic/Immunologic: Positive for environmental allergies.   Psychiatric/Behavioral:  Positive for sleep disturbance.    All other systems  "reviewed and are negative.        Objective:    Physical Exam  Constitutional:       Appearance: Normal appearance.   HENT:      Head: Normocephalic and atraumatic.      Mouth/Throat:      Comments: Mallampati 3 anatomy  Cardiovascular:      Rate and Rhythm: Normal rate and regular rhythm.   Pulmonary:      Effort: Pulmonary effort is normal.      Breath sounds: Normal breath sounds.   Skin:     General: Skin is warm and dry.   Neurological:      Mental Status: She is alert and oriented to person, place, and time.   Psychiatric:         Mood and Affect: Mood normal.         Behavior: Behavior normal.         Thought Content: Thought content normal.         Judgment: Judgment normal.     /78   Pulse 80   Temp 97.7 °F (36.5 °C)   Ht 162.6 cm (64.02\")   Wt 107 kg (235 lb)   LMP 02/05/2021 (Approximate)   SpO2 98%   BMI 40.32 kg/m²       CPAP Report  83/90 days of use  Greater than 4-hour use 92%  Setting 4-20  95th percentile pressure 9.2  AHI of 0.6    The patient continues to use and benefit from CPAP therapy.    ASSESSMENT/PLAN    Diagnoses and all orders for this visit:    1. YE (obstructive sleep apnea) (Primary)  -     PAP Therapy      Refill supplies x 1 year.  Return to clinic 1 year or sooner as symptoms warrant.    Signed by  Debbie Jeong, APRN    December 19, 2024      CC: Krista Don PA-C         No ref. provider found      "

## 2025-01-13 ENCOUNTER — TELEPHONE (OUTPATIENT)
Dept: GASTROENTEROLOGY | Facility: CLINIC | Age: 50
End: 2025-01-13

## 2025-01-13 NOTE — TELEPHONE ENCOUNTER
Caller: Debbie Clemens    Relationship to patient: Self    Best call back number: [phone]     Patient is needing: PT CALLING TO RESCHEDULE APPT WALDEMAR ARANDA ON 4/8/25. UNABLE TO WT, PLEASE CALL BACK WHEN NEXT AVAILABLE.

## 2025-02-20 ENCOUNTER — LAB (OUTPATIENT)
Dept: INTERNAL MEDICINE | Facility: CLINIC | Age: 50
End: 2025-02-20
Payer: COMMERCIAL

## 2025-02-20 ENCOUNTER — OFFICE VISIT (OUTPATIENT)
Dept: INTERNAL MEDICINE | Facility: CLINIC | Age: 50
End: 2025-02-20
Payer: COMMERCIAL

## 2025-02-20 VITALS
HEIGHT: 64 IN | SYSTOLIC BLOOD PRESSURE: 128 MMHG | BODY MASS INDEX: 39.23 KG/M2 | WEIGHT: 229.8 LBS | RESPIRATION RATE: 20 BRPM | OXYGEN SATURATION: 99 % | HEART RATE: 84 BPM | DIASTOLIC BLOOD PRESSURE: 74 MMHG | TEMPERATURE: 97.8 F

## 2025-02-20 DIAGNOSIS — R74.8 ELEVATED ALKALINE PHOSPHATASE LEVEL: ICD-10-CM

## 2025-02-20 DIAGNOSIS — R73.03 PREDIABETES: ICD-10-CM

## 2025-02-20 DIAGNOSIS — E55.9 VITAMIN D DEFICIENCY: ICD-10-CM

## 2025-02-20 DIAGNOSIS — Z00.00 ROUTINE GENERAL MEDICAL EXAMINATION AT A HEALTH CARE FACILITY: ICD-10-CM

## 2025-02-20 DIAGNOSIS — R00.0 TACHYCARDIA: ICD-10-CM

## 2025-02-20 DIAGNOSIS — E66.812 CLASS 2 OBESITY DUE TO EXCESS CALORIES WITHOUT SERIOUS COMORBIDITY WITH BODY MASS INDEX (BMI) OF 39.0 TO 39.9 IN ADULT: ICD-10-CM

## 2025-02-20 DIAGNOSIS — G47.33 OSA (OBSTRUCTIVE SLEEP APNEA): ICD-10-CM

## 2025-02-20 DIAGNOSIS — E66.09 CLASS 2 OBESITY DUE TO EXCESS CALORIES WITHOUT SERIOUS COMORBIDITY WITH BODY MASS INDEX (BMI) OF 39.0 TO 39.9 IN ADULT: ICD-10-CM

## 2025-02-20 DIAGNOSIS — E04.1 THYROID NODULE: ICD-10-CM

## 2025-02-20 DIAGNOSIS — K76.0 FATTY LIVER: ICD-10-CM

## 2025-02-20 DIAGNOSIS — E05.90 HYPERTHYROIDISM: ICD-10-CM

## 2025-02-20 DIAGNOSIS — K50.111 CROHN'S DISEASE OF LARGE INTESTINE WITH RECTAL BLEEDING: ICD-10-CM

## 2025-02-20 DIAGNOSIS — Z13.0 SCREENING FOR DEFICIENCY ANEMIA: ICD-10-CM

## 2025-02-20 DIAGNOSIS — Z00.00 ROUTINE GENERAL MEDICAL EXAMINATION AT A HEALTH CARE FACILITY: Primary | ICD-10-CM

## 2025-02-20 DIAGNOSIS — E53.8 VITAMIN B12 DEFICIENCY: ICD-10-CM

## 2025-02-20 LAB
25(OH)D3 SERPL-MCNC: 16.8 NG/ML (ref 30–100)
ALBUMIN SERPL-MCNC: 4.2 G/DL (ref 3.5–5.2)
ALBUMIN/GLOB SERPL: 1.4 G/DL
ALP SERPL-CCNC: 134 U/L (ref 39–117)
ALT SERPL W P-5'-P-CCNC: 17 U/L (ref 1–33)
ANION GAP SERPL CALCULATED.3IONS-SCNC: 12 MMOL/L (ref 5–15)
AST SERPL-CCNC: 26 U/L (ref 1–32)
BASOPHILS # BLD AUTO: 0.1 10*3/MM3 (ref 0–0.2)
BASOPHILS NFR BLD AUTO: 1.1 % (ref 0–1.5)
BILIRUB SERPL-MCNC: 0.3 MG/DL (ref 0–1.2)
BUN SERPL-MCNC: 11 MG/DL (ref 6–20)
BUN/CREAT SERPL: 14.1 (ref 7–25)
CALCIUM SPEC-SCNC: 9.4 MG/DL (ref 8.6–10.5)
CHLORIDE SERPL-SCNC: 102 MMOL/L (ref 98–107)
CHOLEST SERPL-MCNC: 208 MG/DL (ref 0–200)
CO2 SERPL-SCNC: 24 MMOL/L (ref 22–29)
CREAT SERPL-MCNC: 0.78 MG/DL (ref 0.57–1)
DEPRECATED RDW RBC AUTO: 43.7 FL (ref 37–54)
EGFRCR SERPLBLD CKD-EPI 2021: 93.2 ML/MIN/1.73
EOSINOPHIL # BLD AUTO: 0.21 10*3/MM3 (ref 0–0.4)
EOSINOPHIL NFR BLD AUTO: 2.3 % (ref 0.3–6.2)
ERYTHROCYTE [DISTWIDTH] IN BLOOD BY AUTOMATED COUNT: 12.8 % (ref 12.3–15.4)
EXPIRATION DATE: ABNORMAL
FERRITIN SERPL-MCNC: 39.7 NG/ML (ref 13–150)
FOLATE SERPL-MCNC: 12 NG/ML (ref 4.78–24.2)
GGT SERPL-CCNC: 15 U/L (ref 5–36)
GLOBULIN UR ELPH-MCNC: 3 GM/DL
GLUCOSE SERPL-MCNC: 93 MG/DL (ref 65–99)
HBA1C MFR BLD: 5.8 % (ref 4.5–5.7)
HBA1C MFR BLD: 6 % (ref 4.8–5.6)
HCT VFR BLD AUTO: 41.7 % (ref 34–46.6)
HDLC SERPL-MCNC: 47 MG/DL (ref 40–60)
HGB BLD-MCNC: 14 G/DL (ref 12–15.9)
IMM GRANULOCYTES # BLD AUTO: 0.02 10*3/MM3 (ref 0–0.05)
IMM GRANULOCYTES NFR BLD AUTO: 0.2 % (ref 0–0.5)
IRON 24H UR-MRATE: 106 MCG/DL (ref 37–145)
IRON SATN MFR SERPL: 24 % (ref 20–50)
LDLC SERPL CALC-MCNC: 146 MG/DL (ref 0–100)
LDLC/HDLC SERPL: 3.08 {RATIO}
LYMPHOCYTES # BLD AUTO: 3.39 10*3/MM3 (ref 0.7–3.1)
LYMPHOCYTES NFR BLD AUTO: 37.2 % (ref 19.6–45.3)
Lab: ABNORMAL
MCH RBC QN AUTO: 31.5 PG (ref 26.6–33)
MCHC RBC AUTO-ENTMCNC: 33.6 G/DL (ref 31.5–35.7)
MCV RBC AUTO: 93.9 FL (ref 79–97)
MONOCYTES # BLD AUTO: 0.61 10*3/MM3 (ref 0.1–0.9)
MONOCYTES NFR BLD AUTO: 6.7 % (ref 5–12)
NEUTROPHILS NFR BLD AUTO: 4.78 10*3/MM3 (ref 1.7–7)
NEUTROPHILS NFR BLD AUTO: 52.5 % (ref 42.7–76)
NRBC BLD AUTO-RTO: 0 /100 WBC (ref 0–0.2)
PLATELET # BLD AUTO: 396 10*3/MM3 (ref 140–450)
PMV BLD AUTO: 10.5 FL (ref 6–12)
POTASSIUM SERPL-SCNC: 4.1 MMOL/L (ref 3.5–5.2)
PROT SERPL-MCNC: 7.2 G/DL (ref 6–8.5)
RBC # BLD AUTO: 4.44 10*6/MM3 (ref 3.77–5.28)
SODIUM SERPL-SCNC: 138 MMOL/L (ref 136–145)
TIBC SERPL-MCNC: 434 MCG/DL (ref 298–536)
TRANSFERRIN SERPL-MCNC: 291 MG/DL (ref 200–360)
TRIGL SERPL-MCNC: 81 MG/DL (ref 0–150)
TSH SERPL DL<=0.05 MIU/L-ACNC: 0.97 UIU/ML (ref 0.27–4.2)
VIT B12 BLD-MCNC: 339 PG/ML (ref 211–946)
VLDLC SERPL-MCNC: 15 MG/DL (ref 5–40)
WBC NRBC COR # BLD AUTO: 9.11 10*3/MM3 (ref 3.4–10.8)

## 2025-02-20 PROCEDURE — 36415 COLL VENOUS BLD VENIPUNCTURE: CPT | Performed by: PHYSICIAN ASSISTANT

## 2025-02-20 PROCEDURE — 82977 ASSAY OF GGT: CPT | Performed by: PHYSICIAN ASSISTANT

## 2025-02-20 PROCEDURE — 80050 GENERAL HEALTH PANEL: CPT | Performed by: PHYSICIAN ASSISTANT

## 2025-02-20 PROCEDURE — 82607 VITAMIN B-12: CPT | Performed by: PHYSICIAN ASSISTANT

## 2025-02-20 PROCEDURE — 82306 VITAMIN D 25 HYDROXY: CPT | Performed by: PHYSICIAN ASSISTANT

## 2025-02-20 PROCEDURE — 84466 ASSAY OF TRANSFERRIN: CPT | Performed by: PHYSICIAN ASSISTANT

## 2025-02-20 PROCEDURE — 99396 PREV VISIT EST AGE 40-64: CPT | Performed by: PHYSICIAN ASSISTANT

## 2025-02-20 PROCEDURE — 83036 HEMOGLOBIN GLYCOSYLATED A1C: CPT | Performed by: PHYSICIAN ASSISTANT

## 2025-02-20 PROCEDURE — 80061 LIPID PANEL: CPT | Performed by: PHYSICIAN ASSISTANT

## 2025-02-20 PROCEDURE — 83540 ASSAY OF IRON: CPT | Performed by: PHYSICIAN ASSISTANT

## 2025-02-20 PROCEDURE — 82746 ASSAY OF FOLIC ACID SERUM: CPT | Performed by: PHYSICIAN ASSISTANT

## 2025-02-20 PROCEDURE — 82728 ASSAY OF FERRITIN: CPT | Performed by: PHYSICIAN ASSISTANT

## 2025-02-20 NOTE — PROGRESS NOTES
Chief Complaint  Annual Exam, Prediabetes, Hyperthyroidism, and Irregular Heart Beat (Pt states she had a polyp removed on her back and the office states she has an irregular heart beat. They asked for her to see you about it. )    Subjective          History of Present Illness  Debbie Clemens presents to [facility] for a routine physical.  History of Present Illness  Hx Hyperthyroid:  Followed by endo, was prev on methimazole. Had elevated alk phos which was thought to be related to hyperthyroid.     Hx Breast CA:  Had total hysterectomy May 2021 due to prolonged periods and breast cancer hx.  Her breast CA was stage III and it was dx due to lump in her arm pit. Was seeing Dr Yuen for oncology. Had double mastectomy in Jan 2014. Was planning on reconstruction at some point.     Tachycardia:  Followed by cardio. Had normal Echo. Elevated HR likely related to thyroid dz.  No recent palpitations.  No CP, SOA, edema, diaphoresis. Has not had hx of HTN. Father had AMI, first one at age 50s, no fhx of stroke. HR has been up to 120 at rest.     Crohns dz:  Followed by Dr Grant for 30 years, doing ok with Stelara but having elevated fecal calprotectin, no diarrhea recently. Takes vit B12 inj. Used to be on prednisone long term for this.   Sleep Apnea:  Is doing well with CPAP    Obesity:  Is making sure she is getting 10k steps per day and using a walk fit da. Has started taking belly dancing classes. Plans on giving up coffee creamer for Lent. She is trying to eat healthy, does not drink pop. Is not wanting to try weight loss medications at this time.     Thyroid nodules:  Had u/s 2021, due for repeat    Diet/exercise: trying to work on healthy eating  Eye exams: sees eye dr regularly. Wearing glasses.  Dental exams: sees dentist regularly    Patient's last menstrual period was 02/05/2021 (approximate).   reports that she is not currently sexually active and has had partner(s) who  are male. She reports using the following methods of birth control/protection: Condom, Abstinence, None, and Hysterectomy.  Cancer-related family history is negative for Ovarian cancer and Breast cancer.    Mammogram- hx of double mastectomy 2014, did have lymph node removal in left axilla. Followed by oncology yearly. Last Bone scan 12/2022.  Pap-  Hx of complete hysterectomy 2021  DEXA- 2015 normal. Hx of long term prednisone use, hx of hysterectomy age 45.  Colonoscopy- 1/2023, normal. Has Crohns dz and is supposed to get them done every 3 years.      Health Maintenance   Topic Date Due    ANNUAL PHYSICAL  08/03/2024    INFLUENZA VACCINE  03/31/2025 (Originally 7/1/2024)    COVID-19 Vaccine (6 - 2024-25 season) 02/05/2026 (Originally 9/1/2024)    Pneumococcal Vaccine 0-49 (1 of 2 - PCV) 02/20/2026 (Originally 6/15/1994)    COLORECTAL CANCER SCREENING  01/26/2026    BMI FOLLOWUP  02/20/2026    TDAP/TD VACCINES (2 - Td or Tdap) 05/16/2027    HEPATITIS C SCREENING  Completed    PAP SMEAR  Discontinued       Immunization History   Administered Date(s) Administered    COVID-19 (PFIZER) Purple Cap Monovalent 03/11/2021, 04/08/2021    COVID-19 (UNSPECIFIED) 03/11/2021, 04/02/2021    Covid-19 (Pfizer) Gray Cap Monovalent 04/05/2022    Flu Vaccine Intradermal Quad 18-64YR 10/12/2015    Fluzone Quad >6mos (Multi-dose) 10/14/2019    Hepatitis A 05/24/2019, 12/04/2019    Influenza Seasonal Injectable 10/12/2015    MMR 05/24/2019    Td, Unspecified 02/02/2004    Tdap 05/16/2017    flucelvax quad pfs =>4 YRS 10/22/2019       The following portions of the patient's history were reviewed and updated as appropriate: allergies, current medications, past family history, past medical history, past social history, past surgical history and problem list.    Patient Active Problem List   Diagnosis    Ductal carcinoma of left breast    Crohn disease    Crohn's disease of large intestine with rectal bleeding    Intraductal carcinoma of  breast    Acute infection of nasal sinus    Cellulitis of neck    Visual impairment    Redness of eye, right    Tachycardia    Right hip pain    Abnormal EKG    Palpitations    History of breast cancer    Hyperthyroidism    Anxiety    Vertigo    Vitamin B12 deficiency    Routine general medical examination at a health care facility    Elevated alkaline phosphatase level    Chronic cough    Allergic rhinitis    Fatty liver    Prediabetes    Vitamin D deficiency    YE (obstructive sleep apnea)    Bronchitis    Lipoma of back    Thyroid nodule     Allergies   Allergen Reactions    Lialda [Mesalamine] GI Intolerance     Worse symptoms of crohns    Nuts Swelling     TREE NUTS, makes tongue swell    Prilosec [Omeprazole] Diarrhea    Epinephrine Nausea Only     Jittery and nausea     Current Outpatient Medications on File Prior to Visit   Medication Sig Dispense Refill    fluticasone (Flonase) 50 MCG/ACT nasal spray 2 sprays into the nostril(s) as directed by provider Daily. 16 g 2    loratadine (CLARITIN) 10 MG tablet Take 1 tablet by mouth Daily.      multivitamin with minerals (MULTIPLE VITAMINS/WOMENS PO)       multivitamin with minerals tablet tablet Take 1 tablet by mouth Daily.      Ustekinumab (Stelara) 90 MG/ML solution prefilled syringe Injection Inject 90 mg as directed Every 28 (Twenty-Eight) Days. 1 each 11    cyanocobalamin 1000 MCG/ML injection Inject 1 mL into the appropriate muscle as directed by prescriber Every 30 (Thirty) Days. (Patient not taking: Reported on 2/20/2025) 1 mL 12    vitamin D (ERGOCALCIFEROL) 1.25 MG (21016 UT) capsule capsule TAKE 1 CAPSULE BY MOUTH 1 TIME EVERY WEEK (Patient not taking: Reported on 2/20/2025) 12 capsule 1     No current facility-administered medications on file prior to visit.     No orders of the defined types were placed in this encounter.    Past Medical History:   Diagnosis Date    Abnormal EKG     Breast cancer     left    Crohn's disease     age 13    Fatty  liver 2023    Heartburn     tums prn     Hyperthyroidism     Hypothyroidism 2021    Irritable bowel syndrome     YE (obstructive sleep apnea) 2023    Slow to wake up after anesthesia     for colonoscopy - d and c was fine     Thyroid nodule 2025    Vitamin D deficiency 8/3/2023    Wears glasses       Past Surgical History:   Procedure Laterality Date     SECTION      COLONOSCOPY  2019    D & C HYSTEROSCOPY N/A 2021    Procedure: HYSTEROSCOPY DILATION & CURRETAGE;  Surgeon: Liv Lorenzana MD;  Location:  [facility];  Service: Obstetrics/Gynecology;  Laterality: N/A;    FLEXIBLE SIGMOIDOSCOPY      LIPOMA EXCISION  10/2024    MASTECTOMY Bilateral 2014    ROOT CANAL  2017    Dr Teague     TOTAL LAPAROSCOPIC HYSTERECTOMY SALPINGO OOPHORECTOMY N/A 2021    Procedure: ROBOTIC ASSISTED HYSTERECTOMY WITH BILATERAL SALPINGO-OOPHORECTOMY AND CYSTOSCOPY;  Surgeon: Liv Lorenzana MD;  Location:  [facility];  Service: [facility];  Laterality: N/A;      Family History   Problem Relation Age of Onset    Hypertension Mother         High blood pressure    Heart disease Father     Heart attack Father         3 heart attacks    Hypertension Other     Heart disease Other     Hyperlipidemia Other     Ovarian cancer Neg Hx     Breast cancer Neg Hx       Social History     Socioeconomic History    Marital status:    Tobacco Use    Smoking status: Former     Current packs/day: 0.00     Average packs/day: 0.8 packs/day for 10.0 years (7.5 ttl pk-yrs)     Types: Cigarettes     Start date: 2000     Quit date: 2010     Years since quitting: 15.1    Smokeless tobacco: Never   Vaping Use    Vaping status: Never Used   Substance and Sexual Activity    Alcohol use: Yes     Alcohol/week: 4.0 standard drinks of alcohol     Types: 2 Glasses of wine, 2 Cans of beer per week     Comment: couple glasses of wine a week    Drug use: No    Sexual activity: Not Currently     Partners:  "Male     Birth control/protection: Condom, Abstinence, None, Hysterectomy        Objective   Vital Signs:   Vitals:    02/20/25 1002   BP: 128/74   Pulse: 84   Resp: 20   Temp: 97.8 °F (36.6 °C)   TempSrc: Temporal   SpO2: 99%   Weight: 104 kg (229 lb 12.8 oz)   Height: 162.6 cm (64.02\")   PainSc: 0-No pain        Body mass index is 39.42 kg/m².  Patient's Body mass index is 39.42 kg/m². indicating that she is obese (BMI >30). Obesity-related health conditions include the following: none. Obesity is unchanged. BMI is is above average; BMI management plan is completed. We discussed low calorie, low carb based diet program, portion control and increasing exercise..    Physical Exam  Vitals reviewed.   Constitutional:       General: She is not in acute distress.     Appearance: Normal appearance. She is not ill-appearing.   HENT:      Head: Normocephalic and atraumatic.      Right Ear: Tympanic membrane, ear canal and external ear normal.      Left Ear: Tympanic membrane, ear canal and external ear normal.      Mouth/Throat:      Mouth: Mucous membranes are moist.      Pharynx: No oropharyngeal exudate or posterior oropharyngeal erythema.   Eyes:      General: No scleral icterus.     Extraocular Movements: Extraocular movements intact.      Conjunctiva/sclera: Conjunctivae normal.      Pupils: Pupils are equal, round, and reactive to light.   Neck:      Thyroid: No thyromegaly.   Cardiovascular:      Rate and Rhythm: Normal rate and regular rhythm.      Pulses: Normal pulses.      Heart sounds: Normal heart sounds. No murmur heard.  Pulmonary:      Effort: Pulmonary effort is normal. No respiratory distress.      Breath sounds: Normal breath sounds. No stridor. No wheezing, rhonchi or rales.   Abdominal:      General: Bowel sounds are normal. There is no distension.      Palpations: Abdomen is soft. There is no mass.      Tenderness: There is no abdominal tenderness. There is no guarding.   Musculoskeletal:         " General: No signs of injury. Normal range of motion.      Cervical back: Normal range of motion and neck supple.      Right lower leg: No edema.      Left lower leg: No edema.   Lymphadenopathy:      Cervical: No cervical adenopathy.   Skin:     General: Skin is warm and dry.      Coloration: Skin is not jaundiced.      Findings: No rash.   Neurological:      General: No focal deficit present.      Mental Status: She is alert and oriented to person, place, and time.      Gait: Gait normal.   Psychiatric:         Mood and Affect: Mood normal.         Behavior: Behavior normal.          Result Review :               Assessment and Plan    Diagnoses and all orders for this visit:    1. Routine general medical examination at a health care facility (Primary)  -     Cancel: Hemoglobin A1c; Future  -     Comprehensive Metabolic Panel; Future  -     Lipid Panel; Future  -     CBC Auto Differential; Future  -     TSH Rfx On Abnormal To Free T4; Future  -     Iron Profile; Future  -     Ferritin; Future  -     Vitamin D,25-Hydroxy; Future  -     Vitamin B12 & Folate; Future  -     Gamma GT; Future    2. Prediabetes  Assessment & Plan:  Lab Results   Component Value Date    HGBA1C 5.8 (A) 02/20/2025     Cont diet/exercise    Orders:  -     POC Glycosylated Hemoglobin (Hb A1C)  -     Cancel: Hemoglobin A1c; Future    3. YE (obstructive sleep apnea)  Assessment & Plan:  Chronic, stable, cont CPAP      4. Hyperthyroidism  Assessment & Plan:  Check TSH, cont off methimazole at this time. F/u with endo if has abn TSH. Will check q 3-6 mo, monitor for new sx such as fatigue or f/u if tachycardia returns.    Orders:  -     TSH Rfx On Abnormal To Free T4; Future    5. Tachycardia  Assessment & Plan:  Related to Graves dz, prev eval by cardio. Monitor and f/u if worsening or has palpitations.       6. Crohn's disease of large intestine with rectal bleeding  Assessment & Plan:  Chronic, stable, f/u with Dr Rudy NAIK as dir    Orders:  -      Comprehensive Metabolic Panel; Future  -     CBC Auto Differential; Future    7. Fatty liver  Assessment & Plan:  Check labs, mild. Work on diet/exercise    Orders:  -     Comprehensive Metabolic Panel; Future    8. Class 2 obesity due to excess calories without serious comorbidity with body mass index (BMI) of 39.0 to 39.9 in adult  -     Ambulatory Referral to Nutrition Services    9. Vitamin B12 deficiency  -     Vitamin B12 & Folate; Future    10. Vitamin D deficiency  -     Vitamin D,25-Hydroxy; Future    11. Elevated alkaline phosphatase level  -     Comprehensive Metabolic Panel; Future  -     Gamma GT; Future    12. Thyroid nodule  Assessment & Plan:  Order thyroid u/s for surveillance     Orders:  -     TSH Rfx On Abnormal To Free T4; Future  -     US Thyroid; Future    13. Screening for deficiency anemia  -     CBC Auto Differential; Future  -     Iron Profile; Future  -     Ferritin; Future  -     Vitamin B12 & Folate; Future          Follow Up   Return in about 6 months (around 8/20/2025) for Chronic Conditions.    Counseled on health maintenance topics and preventative care recommendations. Follow up yearly for routine physical exam. Diet and exercise counseling given. See dentist and eye doctor yearly as directed.    If labs or images are ordered we will contact you with the results within the next week.  If you have not heard from us after a week please call our office to inquire about the results.    Krista Don PA-C    Patient was given instructions and counseling regarding her condition or for health maintenance advice. Please see specific information pulled into the AVS if appropriate.     * Please note that portions of this note were completed with a voice recognition program.

## 2025-03-03 RX ORDER — ERGOCALCIFEROL 1.25 MG/1
50000 CAPSULE, LIQUID FILLED ORAL WEEKLY
Qty: 12 CAPSULE | Refills: 3 | Status: SHIPPED | OUTPATIENT
Start: 2025-03-03

## 2025-03-05 ENCOUNTER — HOSPITAL ENCOUNTER (OUTPATIENT)
Dept: NUTRITION | Facility: HOSPITAL | Age: 50
Setting detail: RECURRING SERIES
Discharge: HOME | End: 2025-03-05

## 2025-03-05 PROCEDURE — 97802 MEDICAL NUTRITION INDIV IN: CPT

## 2025-03-05 NOTE — CONSULTS
[facility] Nutrition Services          Initial 60 Minute Nutrition Visit    Date: 2025   Patient Name: Debbie Clemens  : 1975   MRN: [id number]   Referring Provider: Krista Don PA-C    Reason for Visit: Weight management  Visit Format: TEAMS    Nutrition Assessment       Social History:   Social History     Socioeconomic History    Marital status:    Tobacco Use    Smoking status: Former     Current packs/day: 0.00     Average packs/day: 0.8 packs/day for 10.0 years (7.5 ttl pk-yrs)     Types: Cigarettes     Start date: 2000     Quit date: 2010     Years since quitting: 15.1    Smokeless tobacco: Never   Vaping Use    Vaping status: Never Used   Substance and Sexual Activity    Alcohol use: Yes     Alcohol/week: 4.0 standard drinks of alcohol     Types: 2 Glasses of wine, 2 Cans of beer per week     Comment: couple glasses of wine a week    Drug use: No    Sexual activity: Not Currently     Partners: Male     Birth control/protection: Condom, Abstinence, None, Hysterectomy     Active Problem List:   Patient Active Problem List    Diagnosis     Thyroid nodule [E04.1]     Bronchitis [J40]     Lipoma of back [D17.1]     YE (obstructive sleep apnea) [G47.33]     Prediabetes [R73.03]     Vitamin D deficiency [E55.9]     Fatty liver [K76.0]     Elevated alkaline phosphatase level [R74.8]     Chronic cough [R05.3]     Allergic rhinitis [J30.9]     Vitamin B12 deficiency [E53.8]     Routine general medical examination at a health care facility [Z00.00]     Anxiety [F41.9]     Vertigo [R42]     Hyperthyroidism [E05.90]     Redness of eye, right [H57.89]     Tachycardia [R00.0]     Right hip pain [M25.551]     Abnormal EKG [R94.31]     Palpitations [R00.2]     History of breast cancer [Z85.3]     Acute infection of nasal sinus [J01.90]     Cellulitis of neck [L03.221]     Visual impairment [H54.7]     Crohn's disease of large intestine with rectal bleeding [K50.111]      Ductal carcinoma of left breast [C50.912]     Crohn disease [K50.90]     Intraductal carcinoma of breast [D05.10]       Current Medications:   Current Outpatient Medications:     cyanocobalamin 1000 MCG/ML injection, Inject 1 mL into the appropriate muscle as directed by prescriber Every 30 (Thirty) Days. (Patient not taking: Reported on 2025), Disp: 1 mL, Rfl: 12    fluticasone (Flonase) 50 MCG/ACT nasal spray, 2 sprays into the nostril(s) as directed by provider Daily., Disp: 16 g, Rfl: 2    loratadine (CLARITIN) 10 MG tablet, Take 1 tablet by mouth Daily., Disp: , Rfl:     multivitamin with minerals (MULTIPLE VITAMINS/WOMENS PO), , Disp: , Rfl:     multivitamin with minerals tablet tablet, Take 1 tablet by mouth Daily., Disp: , Rfl:     Ustekinumab (Stelara) 90 MG/ML solution prefilled syringe Injection, Inject 90 mg as directed Every 28 (Twenty-Eight) Days., Disp: 1 each, Rfl: 11    vitamin D (ERGOCALCIFEROL) 1.25 MG (57092 UT) capsule capsule, Take 1 capsule by mouth 1 (One) Time Per Week., Disp: 12 capsule, Rfl: 3    Labs: A1c: 6.0; Chol: 208; LDL: 146; T; HDL: 47; Vit D: 16.8    Hunger Vital Sign Food Insecurity Assessment:  Within the past 12 months I/we worried whether our food would run out before I/we got money to buy more: No   Within the past 12 months the food I/we bought just didn't last and I/we didn't have money to get more: No   Use of food assistance programs (WIC, food stamps, food cabrera) No       Food & Nutrition Related History       Food Allergies: tree nuts   Food Intolerances: None indicated  Food Behavior: None  Nutrition Impact Symptoms: As Debbie has lived with CD since she was 13, she states that her bowel movements are fairly regular with few episodes of diarrhea.   Gastrointestinal conditions that impact intake or food choices: CD  Who prepares most meals: Self  Who does grocery shopping: Self  How many meals are purchased from fast food/sit down restaurants per week: 3-4  "times/week  Difficulty chewin - Normal  Difficulty swallowin - Normal  Diet requirement related to personal preference or cultural belief: None  History of eating disorder/disordered eating habits: None  Language/communication details: English  Barriers to learning: No barriers identified at this time    24 Hour Recall:   Time Food/beverages consumed   Breakfast 1 banana, 2 cups coffee   Snack 20-25 tortilla chips   Lunch 2 bowls of chicken tortilla soup   Snack 3 apples with 2 tbsp of peanut butter   Dinner 2 slices of Red Saenz pizza   Snack none   Beverage Coffee  16-24oz water  Herbal tea   Rarely diet soda  Occasionally 1-2 alcoholic beverages     Additional comments: Debbie has already begun making positive changes and is becoming more mindful of the type of foods that she purchases/keeps available in the house. She has also stopped using her creamer during Lent to see if she can tolerate black coffee long term, specifically for liver health.     Anthropometrics      Height:   Ht Readings from Last 1 Encounters:   25 162.6 cm (64.02\")     Weight:   Wt Readings from Last 3 Encounters:   25 104 kg (229 lb 12.8 oz)   24 107 kg (235 lb)   10/01/24 103 kg (227 lb)     BMI: 34.92  Weight Change: See above     Physical Activity     Physical activity comments: Debbie is working on exercise/movement from many fronts. She is taking a MWF belly dancing class and aiming for a minimum of 10k steps per day. She uses a Walk Fit da to track. Debbie has also been using spare 3-5 minutes throughout the day to incorporate body weight exercise such as squats, knee raises. RD discussed incorporating strength training for bone health, building lean muscle mass, and changing body composition in relation to weight management. RD provided Roberto Reyes's information and discussed how she may benefit Debbie in coming up with a strength routine. Debbie to consider.      Estimated Needs     Estimated Energy " Needs: 1300-1500kcal/day (1.2, -500)    Estimated Protein Needs: 100-125g/day (1.0-1.2g/kg CBW)     Estimated Fluid Needs: Minimum of 64oz water/day     Discussion / Education      Debbie is a 49 year old female referred for weight management who has a history of Crohns, breast cancer, thyroid disease, Vitamins B12 and D deficiencies, fatty liver, PDM and hysterectomy. She feels that her biggest challenge is hormone imbalances due to medical history and her motivation is to improve upon overall health and experience weight loss for her 50th birthday in June. Debbie reports that she has lived with CD since she was 13 and that it is, for the most part, well managed. She states that her GI provider has previously recommended higher protein intake but no specific targets/suggestions were provided. Debbie is frustrated in that she has used [employer] in the past and has somewhat of a nutrition foundation but that she is no longer seeing the weight loss that she once did when she made dietary/exercise changes. 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     During this appointment, the following was discussed:    Macronutrients: what they are, why they are important, and how to incorporate into a balanced snack/meal.   Carbs: further reviewed difference between complex and simple carbs and that it is the type of carb that is important. Focus on eating fiber rich carbs with each snack/meal, being mindful of servings. RD briefly discussed a moderate carb intake of 115-150g/day (35-45%), focusing on complex carbs. Will review in more detail at time of follow-up appointment.   Fiber: where it is found and why it is important for weight loss, blood sugar regulations, GI health and lowering cholesterol. Target is 25g/day. RD discussed spreading fiber intake throughout the day instead of 1-2 concentrated amounts. As CD is well  managed, RD feels that it is appropriate to incorporate daily recommendation of fiber. Discussed incorporating insoluble fiber as it is more easily digested, although balance is key.   Fats: reviewed the differences between saturated and unsaturated fats, examples of both and how to incorporate unsaturated fats into a balanced diet. RD recommends staying under 9g of saturated fat/day and between 20-35g of unsaturated fats, as tolerated. Will discuss further at time of follow-up.   Protein: Protein recommendations are increased based on medical concerns, aiming for 30g per meal and 10-15g per snack. A protein forward morning meal is recommended. Examples provided. RD discussed benefits of increasing protein for building lean muscle mass, which will help change body composition and therefore assist in weight management. RD discussed protein shakes/powders as means to achieve protein recommendations for meals, but prefers natural sources as the first choice.   Bone health: Debbie is currently Vit D deficient and is taking weekly supplements. RD discussed importance of Vit D and Calcium (combined with protein and strength training) for improved bone health.   Nutrition label: briefly discussed monitoring added sugar and where to look for on the nutrition label. Sending educational handout but will discuss further at time of follow-up appointments.   Sweeteners: RD recommended that Debbie abstain from products that use sugar alcohols, as it may cause problems with CD. RD suggested Stevia first and sucralose as being acceptable sweeteners.   Alcohol: Debbie mentioned that she will occasionally enjoy 1-2 alcoholic beverages. RD briefly discussed limiting alcohol intake to help improve upon fatty liver as well as manage CD but feels that an occasional drink to be acceptable.   4-6 smaller snacks/meals would be ideal: Recommended making every snack/meal count by incorporating a balanced plate of lean or plant based proteins  (examples provided) with plenty of fruits, vegetables and whole grains. RD suggested a minimum of three meals but that 2-3 smaller protein/fiber snacks, being mindful of portions, is acceptable and can help manage hunger as well as blood sugar levels.   Hydration: Debbie is currently drinking ~16-24oz of water daily. She would like to include this as a goal to help with overall health as well as weight management. Debbie does have a water bottle at home and plans to use this at work , as it will also provide her a reason to get up and walk to the water cooler at work. RD recommended consistently achieving at least 24oz/day to help create the habit.   Baby steps: by making gradual changes and focusing on 1-2 goals per month, forming successful habits is more realistic and sustainable. Debbie brought up the topic of habit formation at the beginning of the counseling session and is aware that smaller changes lead to more sustainable habits.     Assessment of patient engagement: Engaged; had great questions    Measurement of understanding: Patient verbalized understanding, Patient able to demonstrate understanding with teach back     Resources Provided:  [employer] Healthy Meal and Snack Ideas and High Fiber Food List; Macros; Nutrition Label; Mohan Fiber Foods; Plant Powered Protein; ADA Plate; AND Plate; Lowering Cholesterol and TG; Anti-Inflammatory; information on Roberto Reyes; Protein supplements    RD provided contact information and encouraged Debbie to reach out with further questions.     Goal (s)      Goal 1: Aim for a minimum of 24oz of water/daily.      Goal 2: Consistently consume breakfast, focusing on a more protein forward morning meal of ~30g.      Plan of Care     PES Statement:   Inadequate oral intake related to skipping meals and not eating balanced meals as evidenced by dietary recall and interview.  (Skipping breakfast)    Follow Up Visit      Follow Up:   Wednesday, April 16th at 1:30pm,  TEAMS    Total of 60 minutes spent with patient on nutrition counseling. Education based on Academy of Nutrition and Dietetics guidelines. Patient was provided with RD's contact information. Thank you for this referral.

## 2025-04-01 ENCOUNTER — OFFICE VISIT (OUTPATIENT)
Dept: GASTROENTEROLOGY | Facility: CLINIC | Age: 50
End: 2025-04-01
Payer: COMMERCIAL

## 2025-04-01 VITALS
DIASTOLIC BLOOD PRESSURE: 78 MMHG | WEIGHT: 228 LBS | BODY MASS INDEX: 38.93 KG/M2 | OXYGEN SATURATION: 97 % | SYSTOLIC BLOOD PRESSURE: 132 MMHG | HEART RATE: 91 BPM | HEIGHT: 64 IN

## 2025-04-01 DIAGNOSIS — Z79.899 IMMUNOSUPPRESSION DUE TO DRUG THERAPY: ICD-10-CM

## 2025-04-01 DIAGNOSIS — D84.821 IMMUNOSUPPRESSION DUE TO DRUG THERAPY: ICD-10-CM

## 2025-04-01 DIAGNOSIS — K50.10 CROHN'S DISEASE OF LARGE INTESTINE WITHOUT COMPLICATION: Primary | ICD-10-CM

## 2025-04-01 DIAGNOSIS — E66.812 CLASS 2 OBESITY WITH BODY MASS INDEX (BMI) OF 39.0 TO 39.9 IN ADULT, UNSPECIFIED OBESITY TYPE, UNSPECIFIED WHETHER SERIOUS COMORBIDITY PRESENT: ICD-10-CM

## 2025-04-01 PROCEDURE — 99214 OFFICE O/P EST MOD 30 MIN: CPT | Performed by: INTERNAL MEDICINE

## 2025-04-01 NOTE — PROGRESS NOTES
Patient Name: Debbie Clemens  YOB: 1975   Medical Record number: [id number]     Chief Complaint: Crohn's disease      HPI Debbie is an established patient of mine.  She has had Crohn's disease since childhood.  She currently is on Stelara.  She is doing quite well with this.  She is on a every 4 week basis.  She is having normal bowel movements there is no blood in her stool.  She feels quite well.  She recently has turned over a new leaf.  She is seeing a nutritional list.  She is increasing her physical activity increasing her protein intake, trying to address her lipid normalities and potential prediabetes.  She has a follow-up thyroid ultrasound.  She has had an elevated alkaline phosphatase her GGT is normal.    Past Medical History:   Past Medical History:   Diagnosis Date    Abnormal EKG     Breast cancer     left    Crohn's disease     age 13    Fatty liver 1/13/2023    Heartburn     tums prn     Hyperthyroidism     Hypothyroidism 07/2021    Irritable bowel syndrome     YE (obstructive sleep apnea) 9/14/2023    Slow to wake up after anesthesia     for colonoscopy - d and c was fine     Thyroid nodule 2/20/2025    Vitamin D deficiency 8/3/2023    Wears glasses        Family History:  Family History   Problem Relation Age of Onset    Hypertension Mother         High blood pressure    Heart disease Father     Heart attack Father         3 heart attacks    Hypertension Other     Heart disease Other     Hyperlipidemia Other     Ovarian cancer Neg Hx     Breast cancer Neg Hx        Social History:   reports that she quit smoking about 15 years ago. Her smoking use included cigarettes. She started smoking about 25 years ago. She has a 7.5 pack-year smoking history. She has never used smokeless tobacco. She reports current alcohol use of about 4.0 standard drinks of alcohol per week. She reports that she does not use drugs.    Medications:     Current Outpatient Medications:     cyanocobalamin 1000  "MCG/ML injection, Inject 1 mL into the appropriate muscle as directed by prescriber Every 30 (Thirty) Days., Disp: 1 mL, Rfl: 12    fluticasone (Flonase) 50 MCG/ACT nasal spray, 2 sprays into the nostril(s) as directed by provider Daily., Disp: 16 g, Rfl: 2    loratadine (CLARITIN) 10 MG tablet, Take 1 tablet by mouth Daily., Disp: , Rfl:     multivitamin with minerals (MULTIPLE VITAMINS/WOMENS PO), , Disp: , Rfl:     multivitamin with minerals tablet tablet, Take 1 tablet by mouth Daily., Disp: , Rfl:     Ustekinumab (Stelara) 90 MG/ML solution prefilled syringe Injection, Inject 90 mg as directed Every 28 (Twenty-Eight) Days., Disp: 1 each, Rfl: 11    vitamin D (ERGOCALCIFEROL) 1.25 MG (18455 UT) capsule capsule, Take 1 capsule by mouth 1 (One) Time Per Week., Disp: 12 capsule, Rfl: 3    Allergies:  Lialda [mesalamine], Nuts, Prilosec [omeprazole], and Epinephrine    Review of Systems         Vitals:   /78   Pulse 91   Ht 162.6 cm (64.02\")   Wt 103 kg (228 lb)   LMP 02/05/2021 (Approximate)   SpO2 97%   BMI 39.12 kg/m²      Physical Exam:   Constitutional: Pt is oriented to person, place, and time and well-developed, well-nourished, and in no distress.     Abdominal: Soft. Bowel sounds are normal.   Skin: Skin is warm and dry.   Psychiatric: Mood, memory, affect and judgment normal.           Assessment and Plan Debbie continues to do well on her current biologic to [facility].  Arrangements will be made for her to do her screening colonoscopy later this year.  All of her questions were answered at least 30 minutes of time was spent before during and after the visit and appropriate [id number] was billed        ICD-10-CM ICD-9-CM   1. Crohn's disease of large intestine without complication  K50.10 555.1   2. Immunosuppression due to drug therapy  D84.821 V58.69    Z79.899    3. Class 2 obesity with body mass index (BMI) of 39.0 to 39.9 in adult, unspecified obesity type, unspecified whether serious comorbidity " present  E66.812 278.00    Z68.39 V85.39     Sergey Grant M.D.  [facility]   Please note that portions of this note were completed with a voice recognition program.

## 2025-04-02 ENCOUNTER — HOSPITAL ENCOUNTER (OUTPATIENT)
Dept: ULTRASOUND IMAGING | Facility: HOSPITAL | Age: 50
Discharge: HOME | End: 2025-04-02
Admitting: PHYSICIAN ASSISTANT
Payer: COMMERCIAL

## 2025-04-02 DIAGNOSIS — E04.1 THYROID NODULE: ICD-10-CM

## 2025-04-02 PROCEDURE — 76536 US EXAM OF HEAD AND NECK: CPT

## 2025-04-08 ENCOUNTER — RESULTS FOLLOW-UP (OUTPATIENT)
Dept: INTERNAL MEDICINE | Facility: CLINIC | Age: 50
End: 2025-04-08
Payer: COMMERCIAL

## 2025-04-16 ENCOUNTER — HOSPITAL ENCOUNTER (OUTPATIENT)
Dept: NUTRITION | Facility: HOSPITAL | Age: 50
Setting detail: RECURRING SERIES
Discharge: HOME | End: 2025-04-16

## 2025-04-16 NOTE — PROGRESS NOTES
"[facility] Nutrition Services   Free 30 Minute Nutrition Follow Up     Date: 2025   Patient Name: Debbie Clemens  : 1975   MRN: [id number]   Referring Provider: Krista Don PA-C    Reason for Visit: Weight management  Visit Format: Telehealth  Last Appointment Date:     Nutrition Assessment       Updated Labs: None at this time  Food Insecurity: No change  Food Behavior: None  Nutrition Impact Symptoms: As Debbie has lived with CD since she was 13, she states that her bowel movements are fairly regular with few episodes of diarrhea.   Difficulty chewin - Normal  Difficulty swallowin - Normal    Food Allergies: tree nuts   Food Intolerances: None indicated    Anthropometrics      Height:   Ht Readings from Last 1 Encounters:   25 162.6 cm (64.02\")     Weight:   Wt Readings from Last 3 Encounters:   25 103 kg (228 lb)   25 104 kg (229 lb 12.8 oz)   24 107 kg (235 lb)     Weight Change: Debbie reports that her scale showed 219.9lbs as of this morning.      Discussion / Education      Debbie presents today for follow-up nutrition counseling. Since the time of her initial appointment, Debbie reports both scale and non-scale victories. While her focus has not been on the numbers on the scale, she has seen a 5lb change since her initial appointment based on her home scale. Debbie has increased her protein intake and is consuming protein with every snack and meal in the form of high protein Greek yogurt, seeds, peanut butter (tree nut allergy but no concern with peanuts), high protein granola, and protein shakes (to use as creamer with her coffee). Debbie is also experimenting with new vegetables and ways of preparing them. She has been reading nutrition labels more carefully and was shocked to discover how much added sugar so many of her previously consumed items contained. She has since made swaps in order to reduce calories and added sugar. Debbie " feels that she was on track with drinking more water but, while she was out of town last week, she fell back to minimal water consumption. She has discovered that she enjoys water with cucumbers and various fruits and intends to purchase lemon or lime juice to have as an additive as well. Debbie continues with her belly dancing classes and feels that she is ready to begin with strength training. She remarked that she has noticed a difference in her body from videos that were taken several weeks ago when she first started belly dancing classes to now. Debbie hopes to be a positive influence on her family for nutrition and exercise, especially with her daughter returning home for the summer at the end of her college semester next month.     RD is thrilled with Debbie's progress over the past month and encourages her to continue making healthy swaps and being mindful of her dietary choices. RD reiterated having protein with every snack and meal as well as the importance of adequate hydration. RD discussed ways to increase water intake compliance and returns to original recommendation of an absolute minimum of 24oz of water/day, to be increased as Debbie becomes more consistent with her intake. RD returned to the topic of adding in strength training. Debbie feels that she is not yet ready to work with Roberto Reyes but is willing to begin strength training on her own twice weekly. RD stressed the importance of using weights as opposed to toning exercises such as yoga but suggested that Debbie include that as well to help with mobility and flexibility as part of healthy aging.     Assessment of patient engagement: Engaged    Measurement of understanding: Patient verbalized understanding, Patient able to demonstrate understanding with teach back     Resources Provided:  BodyFit [employer]; [employer] videos to being strength training    Goal (s)    Previous goals:   Goal 1: Aim for a minimum of 24oz of water/daily.   Met: 75%     Goal 2: Consistently consume breakfast, focusing on a more protein forward morning meal of ~30g. Met: 100%    Current goals:  Goal 1: Aim for a minimum of 24oz of water/daily.     Goal 2: Begin to incorporate 15-20 minutes of strength training twice weekly.     Plan of Care     PES Statement:   Inadequate oral intake related to skipping meals and not eating balanced meals as evidenced by dietary recall and interview.  (Skipping breakfast)    Status: Ongoing (improved)    Follow Up Visit      Follow Up scheduled for Wednesday, May 28th at 1:30pm, TEAMS    Total of 30 minutes spent with patient on nutrition counseling. Education based on Academy of Nutrition and Dietetics guidelines. Patient was provided with RD's contact information. Thank you for this referral.

## 2025-05-28 ENCOUNTER — HOSPITAL ENCOUNTER (OUTPATIENT)
Dept: NUTRITION | Facility: HOSPITAL | Age: 50
Setting detail: RECURRING SERIES
Discharge: HOME | End: 2025-05-28

## 2025-05-28 PROCEDURE — 97803 MED NUTRITION INDIV SUBSEQ: CPT

## 2025-05-28 NOTE — PROGRESS NOTES
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   Difficulty chewin - Normal  Difficulty swallowin - Normal     Food Allergies: tree nuts   Food Intolerances: None indicated    Anthropometrics      Height:   Ht Readings from Last 1 Encounters:   25 162.6 cm (64.02\")     Weight:   Wt Readings from Last 3 Encounters:   25 103 kg (228 lb)   25 104 kg (229 lb 12.8 oz)   24 107 kg (235 lb)     Weight Change: Did not discuss today     Discussion / Education      Debbie presents today for continued follow-up nutrition counseling. Since the time of her last appointment, Debbie as non-scale victories to report as well as setbacks. Debbie has been consistently drinking 24oz of water per day and is motivated to increase intake based on recent family medical event that was partially caused by dehydration. She continues to prioritize protein with every snack and meal and be consistent with a morning meal. Debbie continues to experience a change in body composition, especially around her hips. She has also noticed an improvement in sleep quality and energy levels. She remains active with her Monday belly dancing class and is looking for other exercise options to work-in during the week. Debbie uses walking to help reduce personal and professional stress and did start with strength training prior to leaving on a vacation to [place] but has struggled to start back since " returning home. Debbie has been prescribed weekly Vit D supplements as well as Vit B12 but has not been taking them as directed. Now that her daughter is out of school, Debbie feels that the likelihood of her remaining consistent with strength training will increase as she now has an accountability partner at home. She will be taking one week off for her 50th birthday and will have a long time friend visiting for the month of June.     Assessment of patient engagement: Engaged    Measurement of understanding: Patient verbalized understanding, Patient able to demonstrate understanding with teach back     Resources Provided:  Discussed [employer] as source of strength training videos; revisited meeting with Roberto Reyes to discuss exercise plan (wait at this time).     Goal (s)    Previous goals:  Goal 1: Aim for a minimum of 24oz of water/daily. Met: 100%     Goal 2: Begin to incorporate 15-20 minutes of strength training twice weekly. Met: 50%    Current goals:  Goal 1: Incorporate 15-20 minutes of strength training twice weekly.     Goal 2: Increase water intake to 48oz three days per week; 32oz four days per week.     Goal 3: Be consistent with taking Vit D and B12 supplements as directed.     Plan of Care     PES Statement:   Inadequate oral intake related to skipping meals and not eating balanced meals as evidenced by dietary recall and interview. (Skipping breakfast)     Status: Ongoing (improved)     Follow Up Visit      Follow Up scheduled for Tuesday, July 15th at 11:15, TEAMS    Total of 30 minutes spent with patient on nutrition counseling. Education based on Academy of Nutrition and Dietetics guidelines. Patient was provided with RD's contact information. Thank you for this referral.

## 2025-05-30 ENCOUNTER — PRIOR AUTHORIZATION (OUTPATIENT)
Dept: GASTROENTEROLOGY | Facility: CLINIC | Age: 50
End: 2025-05-30
Payer: COMMERCIAL

## 2025-06-03 ENCOUNTER — TELEPHONE (OUTPATIENT)
Dept: INTERNAL MEDICINE | Facility: CLINIC | Age: 50
End: 2025-06-03
Payer: COMMERCIAL

## 2025-06-03 NOTE — TELEPHONE ENCOUNTER
Caller: [facility]    Relationship: Other    Best call back number: [phone]     What orders are you requesting : 2 SILICONE BREAST PROTHESIS, 2 LEISURE BREAST FORMS, 4 MASTECTOMY BRAS    FAX -[phone]

## 2025-06-05 NOTE — TELEPHONE ENCOUNTER
Caller: [facility]    Relationship: Other    Best call back number: [phone]     What was the call regarding: ADALBERTO IS CALLING BACK AND WOULD LIKE TO CHECK ON THE STATUS OF THIS.

## 2025-06-09 DIAGNOSIS — Z85.3 HISTORY OF BREAST CANCER: Primary | ICD-10-CM

## 2025-06-09 DIAGNOSIS — Z90.13 ACQUIRED ABSENCE OF BOTH BREASTS: ICD-10-CM

## 2025-06-09 NOTE — TELEPHONE ENCOUNTER
Order has been faxed to [facility] [address], [address], [place] [address] to the fax number provided.

## 2025-06-11 ENCOUNTER — TELEPHONE (OUTPATIENT)
Dept: INTERNAL MEDICINE | Facility: CLINIC | Age: 50
End: 2025-06-11
Payer: COMMERCIAL

## 2025-06-11 NOTE — TELEPHONE ENCOUNTER
Caller: [facility]    Relationship: Other    Best call back number: [phone]     What form or medical record are you requesting: LETTER     Who is requesting this form or medical record from you: [facility]    How would you like to receive the form or medical records (pick-up, mail, fax): FAX  If fax, what is the fax number: [phone]    Additional notes: REQUESTING A LETTER STATING THAT PATIENT HAS A HISTORY OF BREAST CANCER

## 2025-07-15 ENCOUNTER — HOSPITAL ENCOUNTER (OUTPATIENT)
Dept: NUTRITION | Facility: HOSPITAL | Age: 50
Setting detail: RECURRING SERIES
Discharge: HOME | End: 2025-07-15
Payer: COMMERCIAL

## 2025-07-15 PROCEDURE — 97803 MED NUTRITION INDIV SUBSEQ: CPT

## 2025-07-15 NOTE — PROGRESS NOTES
"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   Difficulty chewin - Normal  Difficulty swallowin - Normal     Food Allergies: tree nuts (not official allergy testing; discovered after eating mixed tree nuts and experiencing swollen tongue); Chriag seeds (consumed with overnight oats and noticed slightly swollen tongue)  Food Intolerances: None indicated    Anthropometrics      Height:   Ht Readings from Last 1 Encounters:   25 162.6 cm (64.02\")     Weight:   Wt Readings from Last 3 Encounters:   25 103 kg (228 lb)   25 104 kg (229 lb 12.8 oz)   24 107 kg (235 lb)     Weight Change: Did not discuss     Discussion / Education      Debbie presents today for continued follow-up nutrition counseling. Since the time of her last appointment, Debbie enjoyed her 50th birthday celebration with friends coming in from out of town as well as having the opportunity to spend time with her best friend, also visiting from out of town. Debbie feels that she has done well with increasing her water intake during the work week but still struggles with adequate hydration over the weekend. She did start with strength training for a few sessions but was not consistent and it did not become a routine. She does continue to participate in her belly dancing class on a weekly basis and expresses the desire to incorporate the exercises that they do in weekly class into a daily practice. " Debbie did well with taking Vit D and B12 supplements for two out of six weeks since her last appointment but struggles to remember to take both. A bone density scan has been performed in the recent past and Debbie was surprised that she was WNL due to high levels of prednisone as an early adult to treat CD. She would like to discuss the timeline for a follow-up bone density scan with her provider. Debbie recently tried overnight oats (not a fan) that contained lizz seeds; she reports the feeling of a swollen tongue for which she immediately took Benadryl. Her tree nut allergy was discovered after two episodes of eating mixed tree nuts, spaced several weeks apart, left her with a swollen tongue. She states that her daughter will be returning to school next month.     RD is pleased that Debbie enjoyed her birthday celebration and was able to spend time with friends. RD discussed strength training for weight management, improving lean muscle mass which will in turn improve blood sugar regulation as well as for bone health. RD emphasized the importance of taking her D and B12 supplements due to past low levels and reported fatigue as well as for bone health. RD encouraged Debbie to focus on weekend hydration goals now that her work week water intake has become a habit. RD cautions Debbie from trying lizz seeds again due to most recent reaction and past history of oral swelling with tree nuts.     Assessment of patient engagement: Engaged    Measurement of understanding: Patient verbalized understanding, Patient able to demonstrate understanding with teach back     Resources Provided:  No further resources at this time    Goal (s)    Previous goals:   Goal 1: Incorporate 15-20 minutes of strength training twice weekly. Met: 25%     Goal 2: Increase water intake to 48oz three days per week; 32oz four days per week. Met: 75%     Goal 3: Be consistent with taking Vit D and B12 supplements as directed. Met: 25%    Current  goals:  Goal 1: Be consistent with taking Vit D and B12 supplements as directed.    Goal 2:  Incorporate 15-20 minutes of strength training twice weekly (in one period or broken up throughout the day). Schedule in workout times to increase compliance.     Goal 3: Workweek water intake has improved; focus on weekend water intake, aiming for at least 32oz/day.     Plan of Care     PES Statement:   Inadequate oral intake related to skipping meals and not eating balanced meals as evidenced by dietary recall and interview. (Skipping breakfast)     Status: Ongoing    Follow Up Visit      Follow Up scheduled for Tuesday, September 9th at 11:15am, TEAMS    Total of 30 minutes spent with patient on nutrition counseling. Education based on Academy of Nutrition and Dietetics guidelines. Patient was provided with RD's contact information. Thank you for this referral.

## 2025-07-29 ENCOUNTER — OUTSIDE FACILITY SERVICE (OUTPATIENT)
Dept: GASTROENTEROLOGY | Facility: CLINIC | Age: 50
End: 2025-07-29
Payer: COMMERCIAL

## 2025-07-29 ENCOUNTER — LAB REQUISITION (OUTPATIENT)
Dept: LAB | Facility: HOSPITAL | Age: 50
End: 2025-07-29
Payer: COMMERCIAL

## 2025-07-29 DIAGNOSIS — Z12.11 ENCOUNTER FOR SCREENING FOR MALIGNANT NEOPLASM OF COLON: ICD-10-CM

## 2025-07-29 PROCEDURE — 88305 TISSUE EXAM BY PATHOLOGIST: CPT | Performed by: INTERNAL MEDICINE

## 2025-07-29 PROCEDURE — 45390 COLONOSCOPY W/RESECTION: CPT | Performed by: INTERNAL MEDICINE

## 2025-07-29 PROCEDURE — 45385 COLONOSCOPY W/LESION REMOVAL: CPT | Performed by: INTERNAL MEDICINE

## 2025-07-29 PROCEDURE — 45380 COLONOSCOPY AND BIOPSY: CPT | Performed by: INTERNAL MEDICINE

## 2025-08-14 ENCOUNTER — OFFICE VISIT (OUTPATIENT)
Dept: INTERNAL MEDICINE | Facility: CLINIC | Age: 50
End: 2025-08-14
Payer: COMMERCIAL

## 2025-08-14 ENCOUNTER — LAB (OUTPATIENT)
Dept: INTERNAL MEDICINE | Facility: CLINIC | Age: 50
End: 2025-08-14
Payer: COMMERCIAL

## 2025-08-14 VITALS
DIASTOLIC BLOOD PRESSURE: 78 MMHG | BODY MASS INDEX: 39.09 KG/M2 | OXYGEN SATURATION: 98 % | SYSTOLIC BLOOD PRESSURE: 124 MMHG | RESPIRATION RATE: 20 BRPM | HEART RATE: 75 BPM | HEIGHT: 64 IN | WEIGHT: 229 LBS | TEMPERATURE: 97.7 F

## 2025-08-14 DIAGNOSIS — E55.9 VITAMIN D DEFICIENCY: ICD-10-CM

## 2025-08-14 DIAGNOSIS — E78.2 MODERATE MIXED HYPERLIPIDEMIA NOT REQUIRING STATIN THERAPY: ICD-10-CM

## 2025-08-14 DIAGNOSIS — K50.90 CROHN'S DISEASE WITHOUT COMPLICATION, UNSPECIFIED GASTROINTESTINAL TRACT LOCATION: ICD-10-CM

## 2025-08-14 DIAGNOSIS — R73.03 PREDIABETES: ICD-10-CM

## 2025-08-14 DIAGNOSIS — E53.8 VITAMIN B12 DEFICIENCY: ICD-10-CM

## 2025-08-14 DIAGNOSIS — E05.90 HYPERTHYROIDISM: ICD-10-CM

## 2025-08-14 DIAGNOSIS — E05.90 HYPERTHYROIDISM: Primary | ICD-10-CM

## 2025-08-14 DIAGNOSIS — Z13.820 SCREENING FOR OSTEOPOROSIS: ICD-10-CM

## 2025-08-14 DIAGNOSIS — E04.1 THYROID NODULE: ICD-10-CM

## 2025-08-14 LAB
25(OH)D3 SERPL-MCNC: 23.7 NG/ML (ref 30–100)
ALBUMIN SERPL-MCNC: 4.3 G/DL (ref 3.5–5.2)
ALBUMIN/GLOB SERPL: 1.4 G/DL
ALP SERPL-CCNC: 120 U/L (ref 39–117)
ALT SERPL W P-5'-P-CCNC: 13 U/L (ref 1–33)
ANION GAP SERPL CALCULATED.3IONS-SCNC: 13 MMOL/L (ref 5–15)
AST SERPL-CCNC: 19 U/L (ref 1–32)
BASOPHILS # BLD AUTO: 0.08 10*3/MM3 (ref 0–0.2)
BASOPHILS NFR BLD AUTO: 0.9 % (ref 0–1.5)
BILIRUB SERPL-MCNC: 0.3 MG/DL (ref 0–1.2)
BUN SERPL-MCNC: 14 MG/DL (ref 6–20)
BUN/CREAT SERPL: 19.4 (ref 7–25)
CALCIUM SPEC-SCNC: 9.3 MG/DL (ref 8.6–10.5)
CHLORIDE SERPL-SCNC: 104 MMOL/L (ref 98–107)
CHOLEST SERPL-MCNC: 199 MG/DL (ref 0–200)
CO2 SERPL-SCNC: 24 MMOL/L (ref 22–29)
CREAT SERPL-MCNC: 0.72 MG/DL (ref 0.57–1)
DEPRECATED RDW RBC AUTO: 44.6 FL (ref 37–54)
EGFRCR SERPLBLD CKD-EPI 2021: 102 ML/MIN/1.73
EOSINOPHIL # BLD AUTO: 0.23 10*3/MM3 (ref 0–0.4)
EOSINOPHIL NFR BLD AUTO: 2.7 % (ref 0.3–6.2)
ERYTHROCYTE [DISTWIDTH] IN BLOOD BY AUTOMATED COUNT: 12.9 % (ref 12.3–15.4)
FOLATE SERPL-MCNC: 14.6 NG/ML (ref 4.78–24.2)
GLOBULIN UR ELPH-MCNC: 3.1 GM/DL
GLUCOSE SERPL-MCNC: 99 MG/DL (ref 65–99)
HBA1C MFR BLD: 5.9 % (ref 4.8–5.6)
HCT VFR BLD AUTO: 40.5 % (ref 34–46.6)
HDLC SERPL-MCNC: 40 MG/DL (ref 40–60)
HGB BLD-MCNC: 13.4 G/DL (ref 12–15.9)
IMM GRANULOCYTES # BLD AUTO: 0.02 10*3/MM3 (ref 0–0.05)
IMM GRANULOCYTES NFR BLD AUTO: 0.2 % (ref 0–0.5)
LDLC SERPL CALC-MCNC: 138 MG/DL (ref 0–100)
LDLC/HDLC SERPL: 3.4 {RATIO}
LYMPHOCYTES # BLD AUTO: 2.47 10*3/MM3 (ref 0.7–3.1)
LYMPHOCYTES NFR BLD AUTO: 28.5 % (ref 19.6–45.3)
MCH RBC QN AUTO: 31.2 PG (ref 26.6–33)
MCHC RBC AUTO-ENTMCNC: 33.1 G/DL (ref 31.5–35.7)
MCV RBC AUTO: 94.4 FL (ref 79–97)
MONOCYTES # BLD AUTO: 0.73 10*3/MM3 (ref 0.1–0.9)
MONOCYTES NFR BLD AUTO: 8.4 % (ref 5–12)
NEUTROPHILS NFR BLD AUTO: 5.14 10*3/MM3 (ref 1.7–7)
NEUTROPHILS NFR BLD AUTO: 59.3 % (ref 42.7–76)
NRBC BLD AUTO-RTO: 0 /100 WBC (ref 0–0.2)
PLATELET # BLD AUTO: 349 10*3/MM3 (ref 140–450)
PMV BLD AUTO: 10.2 FL (ref 6–12)
POTASSIUM SERPL-SCNC: 3.9 MMOL/L (ref 3.5–5.2)
PROT SERPL-MCNC: 7.4 G/DL (ref 6–8.5)
RBC # BLD AUTO: 4.29 10*6/MM3 (ref 3.77–5.28)
SODIUM SERPL-SCNC: 141 MMOL/L (ref 136–145)
TRIGL SERPL-MCNC: 116 MG/DL (ref 0–150)
TSH SERPL DL<=0.05 MIU/L-ACNC: 1.18 UIU/ML (ref 0.27–4.2)
VIT B12 BLD-MCNC: 307 PG/ML (ref 211–946)
VLDLC SERPL-MCNC: 21 MG/DL (ref 5–40)
WBC NRBC COR # BLD AUTO: 8.67 10*3/MM3 (ref 3.4–10.8)

## 2025-08-14 PROCEDURE — 36415 COLL VENOUS BLD VENIPUNCTURE: CPT | Performed by: PHYSICIAN ASSISTANT

## 2025-08-14 PROCEDURE — 82746 ASSAY OF FOLIC ACID SERUM: CPT | Performed by: PHYSICIAN ASSISTANT

## 2025-08-14 PROCEDURE — 99214 OFFICE O/P EST MOD 30 MIN: CPT | Performed by: PHYSICIAN ASSISTANT

## 2025-08-14 PROCEDURE — 82306 VITAMIN D 25 HYDROXY: CPT | Performed by: PHYSICIAN ASSISTANT

## 2025-08-14 PROCEDURE — 83921 ORGANIC ACID SINGLE QUANT: CPT | Performed by: PHYSICIAN ASSISTANT

## 2025-08-14 PROCEDURE — 80061 LIPID PANEL: CPT | Performed by: PHYSICIAN ASSISTANT

## 2025-08-14 PROCEDURE — 80050 GENERAL HEALTH PANEL: CPT | Performed by: PHYSICIAN ASSISTANT

## 2025-08-14 PROCEDURE — 82607 VITAMIN B-12: CPT | Performed by: PHYSICIAN ASSISTANT

## 2025-08-14 PROCEDURE — 83036 HEMOGLOBIN GLYCOSYLATED A1C: CPT | Performed by: PHYSICIAN ASSISTANT

## 2025-08-14 RX ORDER — CYANOCOBALAMIN 1000 UG/ML
1000 INJECTION, SOLUTION INTRAMUSCULAR; SUBCUTANEOUS
Qty: 1 ML | Refills: 12 | Status: SHIPPED | OUTPATIENT
Start: 2025-08-14

## 2025-08-23 LAB — METHYLMALONATE SERPL-SCNC: 274 NMOL/L (ref 0–378)

## (undated) DEVICE — Device

## (undated) DEVICE — SUT MNCRYL PLS ANTIB UD 4/0 PS2 18IN

## (undated) DEVICE — APPL CHLORAPREP TINTED 26ML TEAL

## (undated) DEVICE — SNAP KOVER: Brand: UNBRANDED

## (undated) DEVICE — CYSTO/BLADDER IRRIGATION SET, REGULATING CLAMP

## (undated) DEVICE — SUT VIC 0 TIES 18IN J912G

## (undated) DEVICE — CANNULA SEAL

## (undated) DEVICE — BLADELESS OBTURATOR: Brand: WECK VISTA

## (undated) DEVICE — GLV SURG SIGNATURE TOUCH PF LTX 7 STRL

## (undated) DEVICE — TUBING, SUCTION, 1/4" X 10', STRAIGHT: Brand: MEDLINE

## (undated) DEVICE — LEX D AND C: Brand: MEDLINE INDUSTRIES, INC.

## (undated) DEVICE — SYR LL TP 10ML STRL

## (undated) DEVICE — DRAPE,TOP,102X53,STERILE: Brand: MEDLINE

## (undated) DEVICE — ADHS SKIN PREMIERPRO EXOFIN TOPICAL HI/VISC .5ML

## (undated) DEVICE — CVR HNDL LIGHT RIGID

## (undated) DEVICE — ANTIBACTERIAL UNDYED BRAIDED (POLYGLACTIN 910), SYNTHETIC ABSORBABLE SUTURE: Brand: COATED VICRYL

## (undated) DEVICE — OCCL COLPO PNEUMO  STRL

## (undated) DEVICE — LAP PORT CLOSURE GUIDES 5MM AND 10/12MM: Brand: LAP PORT CLOSURE GUIDES 5MM AND 10/12MM

## (undated) DEVICE — GLV SURG SENSICARE PI MIC PF SZ6.5 LF STRL

## (undated) DEVICE — ARM DRAPE

## (undated) DEVICE — ENDOPATH XCEL BLADELESS TROCARS WITH STABILITY SLEEVES: Brand: ENDOPATH XCEL

## (undated) DEVICE — MANIP UTER ADVINCULA DELINEATOR SFT/CUP 3CM

## (undated) DEVICE — ST TBG CONN PNEUMOCLEAR EVAC SMOKE HEAT/HUMID

## (undated) DEVICE — CATH IV ANGIOCATH FEP 14GA 1.88IN ORNG

## (undated) DEVICE — COLUMN DRAPE

## (undated) DEVICE — PK MAJ GYN DAVINCI 10